# Patient Record
Sex: FEMALE | Race: WHITE | NOT HISPANIC OR LATINO | Employment: OTHER | URBAN - METROPOLITAN AREA
[De-identification: names, ages, dates, MRNs, and addresses within clinical notes are randomized per-mention and may not be internally consistent; named-entity substitution may affect disease eponyms.]

---

## 2017-01-26 ENCOUNTER — OFFICE VISIT (OUTPATIENT)
Dept: URGENT CARE | Facility: URGENT CARE | Age: 57
End: 2017-01-26
Payer: COMMERCIAL

## 2017-01-26 VITALS
TEMPERATURE: 98.5 F | BODY MASS INDEX: 39.34 KG/M2 | HEART RATE: 86 BPM | WEIGHT: 222 LBS | SYSTOLIC BLOOD PRESSURE: 130 MMHG | DIASTOLIC BLOOD PRESSURE: 86 MMHG | OXYGEN SATURATION: 97 %

## 2017-01-26 DIAGNOSIS — R51.9 FACIAL PAIN, ACUTE: ICD-10-CM

## 2017-01-26 DIAGNOSIS — J01.90 ACUTE SINUSITIS WITH SYMPTOMS > 10 DAYS: Primary | ICD-10-CM

## 2017-01-26 PROCEDURE — 99213 OFFICE O/P EST LOW 20 MIN: CPT | Performed by: PHYSICIAN ASSISTANT

## 2017-01-26 RX ORDER — AZITHROMYCIN 250 MG/1
TABLET, FILM COATED ORAL
Qty: 6 TABLET | Refills: 0 | Status: SHIPPED | OUTPATIENT
Start: 2017-01-26 | End: 2017-09-12

## 2017-01-26 RX ORDER — PREDNISONE 20 MG/1
20 TABLET ORAL 2 TIMES DAILY
Qty: 10 TABLET | Refills: 0 | Status: SHIPPED | OUTPATIENT
Start: 2017-01-26 | End: 2017-09-12

## 2017-01-26 NOTE — NURSING NOTE
"Chief Complaint   Patient presents with     URI     sx for 6 days,cough,congestion and now rt ear pain       Initial /86 mmHg  Pulse 86  Temp(Src) 98.5  F (36.9  C) (Oral)  Wt 222 lb (100.699 kg)  SpO2 97%  LMP 02/20/2015 Estimated body mass index is 39.34 kg/(m^2) as calculated from the following:    Height as of 9/12/16: 5' 3\" (1.6 m).    Weight as of this encounter: 222 lb (100.699 kg).  BP completed using cuff size: regular    "

## 2017-01-27 NOTE — PROGRESS NOTES
"SUBJECTIVE:   Onofre Robertson is a 57 year old female presenting with a chief complaint of nasal congestion, rhinorrhea, \"cold symptoms\", ear pain right and facial pain/pressure.  Onset of symptoms was over 1 week  ago.  Course of illness is worsening.    Severity moderate  Current and Associated symptoms: nasal congestion, rhinorrhea, ear pain right and facial pain/pressure  Treatment measures tried include OTC meds.  Predisposing factors include recent illness.    Past Medical History   Diagnosis Date     Malignant neoplasm of thyroid gland (H) 1980     Thyroid cancer     Unspecified hypothyroidism      High cholesterol      Anaphylaxis      reaction to penicillin     Lyme disease      H pylori ulcer      PONV (postoperative nausea and vomiting)      n/v postop     Arthritis         Allergies   Allergen Reactions     Contrast Dye      respiratory     Morphine Anaphylaxis     Penicillins Anaphylaxis     \"ANAPHYLACTIC\" ER     Celexa [Citalopram] Itching, Swelling and Rash     Tongue and face numbness, couldn't taste right,  tongue swelling and itchy rash     Prozac [Fluoxetine] Itching, Swelling and Rash     Tongue and face numbness, couldn't taste right, tongue swelling and itchy rash         Social History   Substance Use Topics     Smoking status: Current Every Day Smoker -- 0.50 packs/day     Types: Other     Last Attempt to Quit: 01/15/2011     Smokeless tobacco: Never Used      Comment: e-cigarettes      Alcohol Use: 0.0 oz/week     0 Standard drinks or equivalent per week      Comment: 6 PER WEEK       ROS:  CONSTITUTIONAL:NEGATIVE for fever, chills, change in weight  INTEGUMENTARY/SKIN: NEGATIVE for worrisome rashes, moles or lesions  ENT/MOUTH: POSITIVE for sinus congestion, sinus pain, right ear pain  RESP:NEGATIVE for significant cough or SOB  CV: NEGATIVE for chest pain, palpitations or peripheral edema  MUSCULOSKELETAL: NEGATIVE for significant arthralgias or myalgia  NEURO: NEGATIVE for weakness, " dizziness or paresthesias    OBJECTIVE  :/86 mmHg  Pulse 86  Temp(Src) 98.5  F (36.9  C) (Oral)  Wt 222 lb (100.699 kg)  SpO2 97%  LMP 02/20/2015  GENERAL APPEARANCE: healthy, alert and no distress  EYES: EOMI,  PERRL, conjunctiva clear  HENT: TM's normal bilaterally, nasal turbinates erythematous, swollen, rhinorrhea purulent, frontal sinus tenderness  and maxillary sinus tenderness   NECK: supple, nontender, no lymphadenopathy  RESP: lungs clear to auscultation - no rales, rhonchi or wheezes  CV: regular rates and rhythm, normal S1 S2, no murmur noted  ABDOMEN:  soft, nontender, no HSM or masses and bowel sounds normal  NEURO: Normal strength and tone, sensory exam grossly normal,  normal speech and mentation  SKIN: no suspicious lesions or rashes    ASSESSMENT/PLAN:      ICD-10-CM    1. Acute sinusitis with symptoms > 10 days J01.90 azithromycin (ZITHROMAX) 250 MG tablet   2. Facial pain, acute R51 predniSONE (DELTASONE) 20 MG tablet     Saline nasal spray  Motrin  Follow up as needed

## 2017-04-27 ENCOUNTER — TRANSFERRED RECORDS (OUTPATIENT)
Dept: HEALTH INFORMATION MANAGEMENT | Facility: CLINIC | Age: 57
End: 2017-04-27

## 2017-09-07 ENCOUNTER — TELEPHONE (OUTPATIENT)
Dept: FAMILY MEDICINE | Facility: CLINIC | Age: 57
End: 2017-09-07

## 2017-09-07 DIAGNOSIS — G89.29 CHRONIC MIDLINE LOW BACK PAIN WITHOUT SCIATICA: Primary | ICD-10-CM

## 2017-09-07 DIAGNOSIS — M54.50 CHRONIC MIDLINE LOW BACK PAIN WITHOUT SCIATICA: Primary | ICD-10-CM

## 2017-09-07 RX ORDER — LIDOCAINE 50 MG/G
1 PATCH TOPICAL EVERY 24 HOURS
Qty: 10 PATCH | Refills: 3 | Status: SHIPPED | OUTPATIENT
Start: 2017-09-07 | End: 2018-12-08

## 2017-09-07 NOTE — TELEPHONE ENCOUNTER
Pending Prescriptions:                       Disp   Refills    lidocaine (LIDODERM) 5 % Patch            10 pat*3            Sig: Place 1 patch onto the skin every 24 hours          Last Written Prescription Date:  na  Last Fill Quantity: na,   # refills: na  Last Office Visit with [facility], [facility] or [facility] prescribing provider: 09/12/2016  Future Office visit:    Next 5 appointments (look out 90 days)     Sep 12, 2017  5:30 PM CDT   [facility]    [address] [facility] [address]   [phone]                   Routing refill request to provider for review/approval because:  Drug not on the [facility], [facility] or [facility] refill protocol or controlled substance  Drug not active on patient's medication list  Medication is reported/historical

## 2017-09-07 NOTE — TELEPHONE ENCOUNTER
Reason for Call:  Medication or medication refill: lidocaine patch     Do you use a [facility]?  Name of the pharmacy and phone number for the current request:     [facility] [address] [place] [address] [address] [facility] [address]    Name of the medication requested: Lidocaine patch     Other request: N/A     Can we leave a detailed message on this number? YES    Phone number patient can be reached at: Work number on file:  [phone] (work)    Best Time: anytime     Call taken on 9/7/2017 at 9:08 AM by Indira Ferguson

## 2017-09-12 ENCOUNTER — OFFICE VISIT (OUTPATIENT)
Dept: FAMILY MEDICINE | Facility: CLINIC | Age: 57
End: 2017-09-12
Payer: COMMERCIAL

## 2017-09-12 VITALS
TEMPERATURE: 98.1 F | BODY MASS INDEX: 39.34 KG/M2 | SYSTOLIC BLOOD PRESSURE: 136 MMHG | OXYGEN SATURATION: 96 % | HEART RATE: 70 BPM | WEIGHT: 222 LBS | DIASTOLIC BLOOD PRESSURE: 76 MMHG | HEIGHT: 63 IN

## 2017-09-12 DIAGNOSIS — E03.9 HYPOTHYROIDISM, UNSPECIFIED TYPE: ICD-10-CM

## 2017-09-12 DIAGNOSIS — E78.5 HYPERLIPIDEMIA LDL GOAL <160: ICD-10-CM

## 2017-09-12 DIAGNOSIS — K62.5 HEMORRHAGE OF RECTUM AND ANUS: Primary | ICD-10-CM

## 2017-09-12 DIAGNOSIS — K21.9 GASTROESOPHAGEAL REFLUX DISEASE WITHOUT ESOPHAGITIS: ICD-10-CM

## 2017-09-12 LAB
ERYTHROCYTE [DISTWIDTH] IN BLOOD BY AUTOMATED COUNT: 12.9 % (ref 10–15)
HCT VFR BLD AUTO: 38.6 % (ref 35–47)
HGB BLD-MCNC: 13.2 G/DL (ref 11.7–15.7)
MCH RBC QN AUTO: 32.6 PG (ref 26.5–33)
MCHC RBC AUTO-ENTMCNC: 34.2 G/DL (ref 31.5–36.5)
MCV RBC AUTO: 95 FL (ref 78–100)
PLATELET # BLD AUTO: 226 10E9/L (ref 150–450)
RBC # BLD AUTO: 4.05 10E12/L (ref 3.8–5.2)
WBC # BLD AUTO: 6.9 10E9/L (ref 4–11)

## 2017-09-12 PROCEDURE — 99213 OFFICE O/P EST LOW 20 MIN: CPT | Performed by: INTERNAL MEDICINE

## 2017-09-12 PROCEDURE — 85027 COMPLETE CBC AUTOMATED: CPT | Performed by: INTERNAL MEDICINE

## 2017-09-12 PROCEDURE — 80061 LIPID PANEL: CPT | Performed by: INTERNAL MEDICINE

## 2017-09-12 PROCEDURE — 36415 COLL VENOUS BLD VENIPUNCTURE: CPT | Performed by: INTERNAL MEDICINE

## 2017-09-12 RX ORDER — SIMVASTATIN 10 MG
10 TABLET ORAL AT BEDTIME
Qty: 90 TABLET | Refills: 3 | Status: SHIPPED | OUTPATIENT
Start: 2017-09-12 | End: 2017-10-30

## 2017-09-12 RX ORDER — OMEPRAZOLE 40 MG/1
40 CAPSULE, DELAYED RELEASE ORAL DAILY
Qty: 90 CAPSULE | Refills: 3 | Status: SHIPPED | OUTPATIENT
Start: 2017-09-12 | End: 2020-04-22

## 2017-09-12 NOTE — LETTER
[place] [address] [facility]  [address]  [place]  [address]  Tel: [phone]    September 18, 2017    Onofre Robertson  [address] [place] [id number]        Dear MsJacquie Ammonaleisha,    I had the opportunity to review your recent labs and a summary of your labs reads as follows:     Your complete blood counts show no sign of anemia, normal white blood cell count and platelets.   Your fasting lipid panel show   - normal HDL (good) cholesterol -as your goal is greater than 40   - low LDL (bad) cholesterol as your goal is less than 160  - no need for statin at this time, but of course the best thing you could do for your cardiac health would be to try to quit smoking.  I know you can do it.  Let me know if you need any help or want to discuss this.   - stable triglyceride levels     The 10-year ASCVD risk score (Augustaterese INGRAM Jr, et al., 2013) is: 6.3%     Values used to calculate the score:       Age: 57 years       Sex: Female       Is Non- : No       Diabetic: No       Tobacco smoker: Yes       Systolic Blood Pressure: 136 mmHg       Is BP treated: No       HDL Cholesterol: 55 mg/dL       Total Cholesterol: 218 mg/dL       Congratulaions on your excellent results       Sincerely,    Jacob Nicole MD/nkechi        Results for orders placed or performed in visit on 09/12/17   CBC with platelets   Result Value Ref Range    WBC 6.9 4.0 - 11.0 10e9/L    RBC Count 4.05 3.8 - 5.2 10e12/L    Hemoglobin 13.2 11.7 - 15.7 g/dL    Hematocrit 38.6 35.0 - 47.0 %    MCV 95 78 - 100 fl    MCH 32.6 26.5 - 33.0 pg    MCHC 34.2 31.5 - 36.5 g/dL    RDW 12.9 10.0 - 15.0 %    Platelet Count 226 150 - 450 10e9/L   Lipid panel reflex to direct LDL   Result Value Ref Range    Cholesterol 218 (H) <200 mg/dL    Triglycerides 184 (H) <150 mg/dL    HDL Cholesterol 55 >49 mg/dL    LDL Cholesterol Calculated 126 (H) <100 mg/dL    Non HDL Cholesterol 163 (H) <130 mg/dL

## 2017-09-12 NOTE — PATIENT INSTRUCTIONS
(K62.5) Hemorrhage of rectum and anus  (primary encounter diagnosis)  Comment: We will refer to gastroenterology - start fiber supplement and increase water in your diet and check complete blood counts and consider colonoscopy  Plan: GASTROENTEROLOGY ADULT REF CONSULT ONLY, CBC         with platelets            (E78.5) Hyperlipidemia LDL goal <160  Comment: check fasting lipid panel today   Plan: simvastatin (ZOCOR) 10 MG tablet, Lipid panel         reflex to direct LDL            (K21.9) Gastroesophageal reflux disease without esophagitis  Comment: gastroesophageal reflux stable - continue omeprazole  Plan: omeprazole (PRILOSEC) 40 MG capsule            (E03.9) Hypothyroidism, unspecified type  Comment: follow up in endocrinology   Plan: CANCELED: TSH with free T4 reflex

## 2017-09-12 NOTE — MR AVS SNAPSHOT
After Visit Summary   9/12/2017    Onofre Robertson    MRN: [id number]           Patient Information     Date Of Birth          1960        Visit Information        Provider Department      9/12/2017 5:30 PM Jacob Nicole MD [facility]        Today's Diagnoses     Hemorrhage of rectum and anus    -  1    Hyperlipidemia LDL goal <160        Gastroesophageal reflux disease without esophagitis        Hypothyroidism, unspecified type          Care Instructions    (K62.5) Hemorrhage of rectum and anus  (primary encounter diagnosis)  Comment: We will refer to gastroenterology - start fiber supplement and increase water in your diet and check complete blood counts and consider colonoscopy  Plan: GASTROENTEROLOGY ADULT REF CONSULT ONLY, CBC         with platelets            (E78.5) Hyperlipidemia LDL goal <160  Comment: check fasting lipid panel today   Plan: simvastatin (ZOCOR) 10 MG tablet, Lipid panel         reflex to direct LDL            (K21.9) Gastroesophageal reflux disease without esophagitis  Comment: gastroesophageal reflux stable - continue omeprazole  Plan: omeprazole (PRILOSEC) 40 MG capsule            (E03.9) Hypothyroidism, unspecified type  Comment: follow up in endocrinology   Plan: CANCELED: TSH with free T4 reflex                     Follow-ups after your visit        Additional Services     GASTROENTEROLOGY ADULT REF CONSULT ONLY       Preferred Location: [facility] [phone]      Please be aware that coverage of these services is subject to the terms and limitations of your health insurance plan.  Call member services at your health plan with any benefit or coverage questions.  Any procedures must be performed at a [facility] facility OR coordinated by your clinic's referral office.    Please bring the following with you to your appointment:    (1) Any X-Rays, CTs or MRIs which have been performed.  Contact the facility where they were done to arrange for pick  "up prior to your scheduled appointment.    (2) List of current medications   (3) This referral request   (4) Any documents/labs given to you for this referral                  Who to contact     If you have questions or need follow up information about today's clinic visit or your schedule please contact [facility] directly at [phone].  Normal or non-critical lab and imaging results will be communicated to you by MyChart, letter or phone within 4 business days after the clinic has received the results. If you do not hear from us within 7 days, please contact the clinic through [facility] or phone. If you have a critical or abnormal lab result, we will notify you by phone as soon as possible.  Submit refill requests through [employer] or call your pharmacy and they will forward the refill request to us. Please allow 3 business days for your refill to be completed.          Additional Information About Your Visit        [facility] [facility] Information     [employer] lets you send messages to your doctor, view your test results, renew your prescriptions, schedule appointments and more. To sign up, go to [email or web address] . Click on \"Log in\" on the left side of the screen, which will take you to the Welcome page. Then click on \"Sign up Now\" on the right side of the page.     You will be asked to enter the access code listed below, as well as some personal information. Please follow the directions to create your username and password.     Your access code is: [email or web address]  Expires: 2017  6:05 PM     Your access code will  in 90 days. If you need help or a new code, please call your [facility] clinic or [phone].        Care EveryWhere ID     This is your Care EveryWhere ID. This could be used by other organizations to access your [facility] medical records  [id number]        Your Vitals Were     Pulse Temperature Height Last Period Pulse Oximetry Breastfeeding?    70 98.1  F (36.7  C) (Tympanic) 5' 3\" " (1.6 m) 02/20/2015 96% No    BMI (Body Mass Index)                   39.33 kg/m2            Blood Pressure from Last 3 Encounters:   09/12/17 136/76   01/26/17 130/86   11/15/16 129/84    Weight from Last 3 Encounters:   09/12/17 222 lb (100.7 kg)   01/26/17 222 lb (100.7 kg)   11/15/16 200 lb (90.7 kg)              We Performed the Following     CBC with platelets     GASTROENTEROLOGY ADULT REF CONSULT ONLY     Lipid panel reflex to direct LDL          Where to get your medicines      These medications were sent to [id number] [address] [place] [facility] [address]  [address], [facility] [address]     Phone:  [phone]     omeprazole 40 MG capsule    simvastatin 10 MG tablet          Primary Care Provider Office Phone # Fax #    Jacob Nicole [phone] [address] [address]        Equal Access to Services     [facility]: Hadii aad ku hadasho Soomaali, waaxda luqadaha, qaybta kaalmada adeegyada, waxarina garvey . So [facility] [phone].    ATENCIÓN: Si habla español, tiene a martinez disposición servicios gratuitos de asistencia lingüística. Llame al [phone].    We comply with applicable federal civil rights laws and [place] laws. We do not discriminate on the basis of race, color, national origin, age, disability sex, sexual orientation or gender identity.            Thank you!     Thank you for choosing [facility]  for your care. Our goal is always to provide you with excellent care. Hearing back from our patients is one way we can continue to improve our services. Please take a few minutes to complete the written survey that you may receive in the mail after your visit with us. Thank you!             Your Updated Medication List - Protect others around you: Learn how to safely use, store and throw away your medicines at www.disposemymeds.org.          This list is  accurate as of: 9/12/17  6:05 PM.  Always use your most recent med list.                   Brand Name Dispense Instructions for use Diagnosis    acetaminophen 325 MG tablet    TYLENOL    100 tablet    Take 2 tablets (650 mg) by mouth every 4 hours as needed for mild pain    S/P hysterectomy with oophorectomy       ibuprofen 800 MG tablet    ADVIL/MOTRIN    90 tablet    Take 1 tablet (800 mg) by mouth every 8 hours as needed for moderate pain    S/P hysterectomy       lidocaine 5 % Patch    LIDODERM    10 patch    Place 1 patch onto the skin every 24 hours    Chronic midline low back pain without sciatica       omeprazole 40 MG capsule    priLOSEC    90 capsule    Take 1 capsule (40 mg) by mouth daily    Gastroesophageal reflux disease without esophagitis       simvastatin 10 MG tablet    ZOCOR    90 tablet    Take 1 tablet (10 mg) by mouth At Bedtime    Hyperlipidemia LDL goal <160       SYNTHROID 137 MCG tablet   Generic drug:  levothyroxine      Take 150 mcg by mouth daily

## 2017-09-12 NOTE — PROGRESS NOTES
"[facility]  CLINIC PROGRESS NOTE    Subjective:  Blood in the stool   Onofre Robertson has noticed bloodint he stool 4-5 times this past year on toilet paper.  Most recently she had an episode two weeks ago of significant blood in the stool as well as on the toilet paper.  Blood was bright red.  No pain, no fevers.  No bleeding since that episode.  No straining to have bowel movement.  No loose stools.  She believes she has had hemorrhoids in the past but no issues.      Past medical history, medications, allergies, social history, family history reviewed and updated in EPIC as of 9/12/2017 .    ROS  CONSTITUTIONAL: no fatigue, no unexpected change in weight  SKIN: no worrisome rashes, no worrisome moles, no worrisome lesions  EYES: no acute vision problems or changes  ENT: no ear problems, no mouth problems, no throat problems  RESP: no significant cough, no shortness of breath  CV: no chest pain, no palpitations, no new or worsening peripheral edema  GI: noted blood in the stool.   : no frequency, no dysuria, no hematuria  MS: no claudication, no myalgias, no joint aches  PSYCHIATRIC: no changes in mood or affect      Objective:  Vitals  /76 (BP Location: Left arm, Patient Position: Sitting, Cuff Size: Adult Large)  Pulse 70  Temp 98.1  F (36.7  C) (Tympanic)  Ht 5' 3\" (1.6 m)  Wt 222 lb (100.7 kg)  LMP 02/20/2015  SpO2 96%  Breastfeeding? No  BMI 39.33 kg/m2  GEN: Alert Oriented x3 NAD  HEENT: Atraumatic, normocephalic, neck supple, no thyromegaly, negative cervical adenopathy  TM: TM bilaterally pearly and grey with normal light reflex  CV: RRR no murmurs or rubs  PULM: CTA no wheezes or crackles  ABD: Soft, nontender nondistended, no hepatosplenomegally  SKIN: No visible skin lesion or ulcerations  EXT: 2+ dorsal pedis pulses, no edema bilateral lower extremities  NEURO: Gait and station deferred, No focal neurologic deficits  PSYCH: Mood good, affect mood congruent    No results " found for this or any previous visit (from the past 24 hour(s)).    Assessment/Plan:  Patient Instructions   (K62.5) Hemorrhage of rectum and anus  (primary encounter diagnosis)  Comment: We will refer to gastroenterology - start fiber supplement and increase water in your diet and check complete blood counts and consider colonoscopy  Plan: GASTROENTEROLOGY ADULT REF CONSULT ONLY, CBC         with platelets            (E78.5) Hyperlipidemia LDL goal <160  Comment: check fasting lipid panel today   Plan: simvastatin (ZOCOR) 10 MG tablet, Lipid panel         reflex to direct LDL            (K21.9) Gastroesophageal reflux disease without esophagitis  Comment: gastroesophageal reflux stable - continue omeprazole  Plan: omeprazole (PRILOSEC) 40 MG capsule            (E03.9) Hypothyroidism, unspecified type  Comment: follow up in endocrinology   Plan: CANCELED: TSH with free T4 reflex               Follow up in gastroenterology     Disclaimer: This note consists of symbols derived from keyboarding, dictation and/or voice recognition software. As a result, there may be errors in the script that have gone undetected. Please consider this when interpreting information found in this chart.    Jacob Nicole MD  [phone]

## 2017-09-12 NOTE — NURSING NOTE
"Chief Complaint   Patient presents with     Rectal Problem       Initial /76 (BP Location: Left arm, Patient Position: Sitting, Cuff Size: Adult Large)  Pulse 70  Temp 98.1  F (36.7  C) (Tympanic)  Ht 5' 3\" (1.6 m)  Wt 222 lb (100.7 kg)  LMP 02/20/2015  SpO2 96%  Breastfeeding? No  BMI 39.33 kg/m2 Estimated body mass index is 39.33 kg/(m^2) as calculated from the following:    Height as of this encounter: 5' 3\" (1.6 m).    Weight as of this encounter: 222 lb (100.7 kg).  Medication Reconciliation: complete   Taylor Dwyer- RIMA      "

## 2017-09-13 LAB
CHOLEST SERPL-MCNC: 218 MG/DL
HDLC SERPL-MCNC: 55 MG/DL
LDLC SERPL CALC-MCNC: 126 MG/DL
NONHDLC SERPL-MCNC: 163 MG/DL
TRIGL SERPL-MCNC: 184 MG/DL

## 2017-09-17 DIAGNOSIS — E78.5 HYPERLIPIDEMIA LDL GOAL <160: ICD-10-CM

## 2017-09-17 DIAGNOSIS — K21.9 GASTROESOPHAGEAL REFLUX DISEASE WITHOUT ESOPHAGITIS: ICD-10-CM

## 2017-09-18 RX ORDER — SIMVASTATIN 10 MG
TABLET ORAL
Refills: 0 | OUTPATIENT
Start: 2017-09-18

## 2017-09-18 RX ORDER — OMEPRAZOLE 40 MG/1
CAPSULE, DELAYED RELEASE ORAL
Refills: 0 | OUTPATIENT
Start: 2017-09-18

## 2017-09-18 NOTE — TELEPHONE ENCOUNTER
Omeprazole      Last Written Prescription Date: 09/12/17  Last Fill Quantity: 90,  # refills: 3   Last Office Visit with [facility] or [facility] prescribing provider: 09/12/17                                             Simvastatin     Last Written Prescription Date: 09/12/17  Last Fill Quantity: 90, # refills: 3  Last Office Visit with [facility] or [facility] prescribing provider: 09/12/17       Lab Results   Component Value Date    CHOL 218 09/12/2017     Lab Results   Component Value Date    HDL 55 09/12/2017     Lab Results   Component Value Date     09/12/2017     Lab Results   Component Value Date    TRIG 184 09/12/2017     Lab Results   Component Value Date    CHOLHDLRATIO 4.3 10/08/2014     Jaida Danielle MA

## 2017-10-30 ENCOUNTER — OFFICE VISIT (OUTPATIENT)
Dept: ENDOCRINOLOGY | Facility: CLINIC | Age: 57
End: 2017-10-30

## 2017-10-30 VITALS
SYSTOLIC BLOOD PRESSURE: 142 MMHG | WEIGHT: 230.3 LBS | OXYGEN SATURATION: 96 % | BODY MASS INDEX: 40.8 KG/M2 | TEMPERATURE: 97.3 F | HEART RATE: 64 BPM | HEIGHT: 63 IN | DIASTOLIC BLOOD PRESSURE: 87 MMHG

## 2017-10-30 DIAGNOSIS — E66.01 MORBID OBESITY (H): Primary | ICD-10-CM

## 2017-10-30 RX ORDER — TOPIRAMATE 25 MG/1
75 TABLET, FILM COATED ORAL AT BEDTIME
Qty: 90 TABLET | Refills: 3 | Status: SHIPPED | OUTPATIENT
Start: 2017-10-30 | End: 2018-02-14

## 2017-10-30 ASSESSMENT — ENCOUNTER SYMPTOMS
CONSTIPATION: 0
BACK PAIN: 1
BLOOD IN STOOL: 0
JOINT SWELLING: 1
MUSCLE WEAKNESS: 0
ABDOMINAL PAIN: 1
STIFFNESS: 1
BLOATING: 1
VOMITING: 0
NAUSEA: 0
HEARTBURN: 1
ARTHRALGIAS: 1
RECTAL PAIN: 0
DIARRHEA: 0
RECTAL BLEEDING: 1
MYALGIAS: 0
JAUNDICE: 0
BOWEL INCONTINENCE: 0
MUSCLE CRAMPS: 0
NECK PAIN: 0

## 2017-10-30 ASSESSMENT — PAIN SCALES - GENERAL: PAINLEVEL: NO PAIN (0)

## 2017-10-30 NOTE — MR AVS SNAPSHOT
After Visit Summary   10/30/2017    Onofre Robertson    MRN: [id number]           Patient Information     Date Of Birth          1960        Visit Information        Provider Department      10/30/2017 2:40 PM Chastity Rausch MD [facility] Management        Today's Diagnoses     Morbid obesity (H)    -  1      Care Instructions      MEDICATION STARTED AT THIS APPOINTMENT  We are starting topiramate at bedtime.  Start one tab, 25 mg, for a week. Go up to 50 mg (2 tabs) for the next week. At the third week, take   3 tabs (75 mg).  Stay at 3 tabs until you are seen again. Call the nurse at [phone] if you have any questions or concerns. (Do not stop taking it if you don't think it's working. For some people it works even though they do not feel much different.)    Topiramate (Topamax) is a medication that is used most often to treat migraine headaches or for seizures. It has also been found to help with weight loss. Although it's not currently FDA approved for weight loss, it has been used safely for a number of years to help people who are carrying extra weight.     Just how topiramate helps with weight loss has not been exactly determined. However it seems to work on areas of the brain to quiet down signals related to eating.      Topiramate may make you:    >feel less interest in eating in between meals   >think less about food and eating   >find it easier to push the plate away   >find giving up pop easier    >have an easier time eating less    For some of our patients, the pills work right away. They feel and think quite differently about food. Other patients don't feel much of a change but find in fact they have lost weight! Like all weight loss medications, topiramate works best when you help it work.  This means:    1) Have less tempting high calorie (fattening) food around the house or office    2) Have lower calorie food (fruits, vegetables,low fat meats and dairy) for  snacks    3) Eat out only one time or less each week.   4) Eat your meals at a table with the TV or computer off.    Side-effects. Topiramate is generally well tolerated. The main side-effects we see are:   Tingling in hands,feet, or face (usually not very troublesome)   Mental confusion and word finding trouble (about 10% of patients have this.)     Feeling sleepy or a bit dopey- this goes away very soon after starting.    One of the dangers of topiramate is the possibility of birth defects--if you get pregnant when you are on it, there is the risk that your baby will be born with a cleft lip or palate.  If you are on topiramate and of child bearing age, you need to be on a reliable form of birth control or refrain from sexual intercourse.     Please refer to the pharmacy insert for more information on side-effects. Since many pharmacists are not familiar with the use of topiramate in weight loss, calling the clinic will get you the most accurate information on the use of this medication for weight loss.     In order to get refills of this or any medication we prescribe you must be seen in the medical weight mgmt clinic every 2-3 months. Please have your pharmacy fax a refill request to [phone].                  Follow-ups after your visit        Follow-up notes from your care team     Return in about 6 weeks (around 12/11/2017).      Who to contact     Please call your clinic at [phone] to:    Ask questions about your health    Make or cancel appointments    Discuss your medicines    Learn about your test results    Speak to your doctor   If you have compliments or concerns about an experience at your clinic, or if you wish to file a complaint, please contact [facility] Patient Relations at [phone] or email us at [email or web address]         Additional Information About Your Visit        M[employer] Information     [employer] is an electronic gateway that provides  "easy, online access to your medical records. With disc[employer], you can request a clinic appointment, read your test results, renew a prescription or communicate with your care team.     To sign up for disco volante visit the website at [email or web address]   You will be asked to enter the access code listed below, as well as some personal information. Please follow the directions to create your username and password.     Your access code is: [email or web address]  Expires: 2017  6:05 PM     Your access code will  in 90 days. If you need help or a new code, please contact your [facility] or call [phone] for assistance.        Care EveryWhere ID     This is your Care EveryWhere ID. This could be used by other organizations to access your [facility] medical records  [id number]        Your Vitals Were     Pulse Temperature Height Last Period Pulse Oximetry BMI (Body Mass Index)    64 97.3  F (36.3  C) 1.6 m (5' 3\") 2015 96% 40.8 kg/m2       Blood Pressure from Last 3 Encounters:   10/30/17 142/87   17 136/76   17 130/86    Weight from Last 3 Encounters:   10/30/17 104.5 kg (230 lb 4.8 oz)   17 100.7 kg (222 lb)   17 100.7 kg (222 lb)              Today, you had the following     No orders found for display         Today's Medication Changes          These changes are accurate as of: 10/30/17  3:37 PM.  If you have any questions, ask your nurse or doctor.               Start taking these medicines.        Dose/Directions    topiramate 25 MG tablet   Commonly known as:  TOPAMAX   Used for:  Morbid obesity (H)        Dose:  75 mg   Take 3 tablets (75 mg) by mouth At Bedtime   Quantity:  90 tablet   Refills:  3            Where to get your medicines      Call your pharmacy to confirm that your medication is ready for pickup. It may take up to 24 hours for them to receive the prescription. If the prescription is not ready within 3 business days, please contact " your clinic or your provider.     We will let you know when these medications are ready. If you don't hear back within 3 business days, please contact us.     topiramate 25 MG tablet                Primary Care Provider Office Phone # Fax #    Jacob Nicole [phone] [address]  [place] [address]        Equal Access to Services     [facility]: Hadii aad ku hadasho Soomaali, waaxda luqadaha, qaybta kaalmada adeegyada, waxay idiin hayaan adeeg kharash la'aan . So [facility] [phone].    ATENCIÓN: Si habla español, tiene a martinez disposición servicios gratuitos de asistencia lingüística. Louisame al [phone].    We comply with applicable federal civil rights laws and [place] laws. We do not discriminate on the basis of race, color, national origin, age, disability, sex, sexual orientation, or gender identity.            Thank you!     Thank you for choosing [facility]ICAL [facility] MANAGEMENT  for your care. Our goal is always to provide you with excellent care. Hearing back from our patients is one way we can continue to improve our services. Please take a few minutes to complete the written survey that you may receive in the mail after your visit with us. Thank you!             Your Updated Medication List - Protect others around you: Learn how to safely use, store and throw away your medicines at www.disposemymeds.org.          This list is accurate as of: 10/30/17  3:37 PM.  Always use your most recent med list.                   Brand Name Dispense Instructions for use Diagnosis    acetaminophen 325 MG tablet    TYLENOL    100 tablet    Take 2 tablets (650 mg) by mouth every 4 hours as needed for mild pain    S/P hysterectomy with oophorectomy       ibuprofen 800 MG tablet    ADVIL/MOTRIN    90 tablet    Take 1 tablet (800 mg) by mouth every 8 hours as needed for moderate pain    S/P hysterectomy       lidocaine 5 % Patch    LIDODERM    10 patch    Place 1 patch onto  the skin every 24 hours    Chronic midline low back pain without sciatica       omeprazole 40 MG capsule    priLOSEC    90 capsule    Take 1 capsule (40 mg) by mouth daily    Gastroesophageal reflux disease without esophagitis       SYNTHROID 137 MCG tablet   Generic drug:  levothyroxine      Take 150 mcg by mouth daily        topiramate 25 MG tablet    TOPAMAX    90 tablet    Take 3 tablets (75 mg) by mouth At Bedtime    Morbid obesity (H)

## 2017-10-30 NOTE — PATIENT INSTRUCTIONS
MEDICATION STARTED AT THIS APPOINTMENT  We are starting topiramate at bedtime.  Start one tab, 25 mg, for a week. Go up to 50 mg (2 tabs) for the next week. At the third week, take   3 tabs (75 mg).  Stay at 3 tabs until you are seen again. Call the nurse at [phone] if you have any questions or concerns. (Do not stop taking it if you don't think it's working. For some people it works even though they do not feel much different.)    Topiramate (Topamax) is a medication that is used most often to treat migraine headaches or for seizures. It has also been found to help with weight loss. Although it's not currently FDA approved for weight loss, it has been used safely for a number of years to help people who are carrying extra weight.     Just how topiramate helps with weight loss has not been exactly determined. However it seems to work on areas of the brain to quiet down signals related to eating.      Topiramate may make you:    >feel less interest in eating in between meals   >think less about food and eating   >find it easier to push the plate away   >find giving up pop easier    >have an easier time eating less    For some of our patients, the pills work right away. They feel and think quite differently about food. Other patients don't feel much of a change but find in fact they have lost weight! Like all weight loss medications, topiramate works best when you help it work.  This means:    1) Have less tempting high calorie (fattening) food around the house or office    2) Have lower calorie food (fruits, vegetables,low fat meats and dairy) for snacks    3) Eat out only one time or less each week.   4) Eat your meals at a table with the TV or computer off.    Side-effects. Topiramate is generally well tolerated. The main side-effects we see are:   Tingling in hands,feet, or face (usually not very troublesome)   Mental confusion and word finding trouble (about 10% of patients have this.)     Feeling sleepy  or a bit dopey- this goes away very soon after starting.    One of the dangers of topiramate is the possibility of birth defects--if you get pregnant when you are on it, there is the risk that your baby will be born with a cleft lip or palate.  If you are on topiramate and of child bearing age, you need to be on a reliable form of birth control or refrain from sexual intercourse.     Please refer to the pharmacy insert for more information on side-effects. Since many pharmacists are not familiar with the use of topiramate in weight loss, calling the clinic will get you the most accurate information on the use of this medication for weight loss.     In order to get refills of this or any medication we prescribe you must be seen in the medical weight mgmt clinic every 2-3 months. Please have your pharmacy fax a refill request to [phone].

## 2017-10-30 NOTE — PROGRESS NOTES
"    New Medical Weight Management Consult    PATIENT:  Onofre Robertson  MRN:         [id number]  :         1960  GERARDO:         10/30/2017    Dear Corey, Jacob Ventura,    I had the pleasure of seeing your patient, Onofre Robertson.  Full intake/assessment done to determine barriers to weight loss success and develop a treatment plan.  Onofre Robertson is a 57 year old female interested in treatment of medical problems associated with weight.      No breakfast  First meal is lunch at 11 am.   Dinner at home      Her weight today is 230 lbs 4.8 oz, Body mass index is 40.8 kg/(m^2)., and she has the following co-morbidities:     10/30/2017   I have the following co-morbidities associated with obesity: GERD (Reflux), Weight Bearing Joint Pain       Patient Goals Reviewed With Patient 10/30/2017   I am interested in attaining a healthier weight to diminish current health problems related to co-morbid conditions: Yes   I am interested in attaining a healthier weight in order to prevent future health problems: Yes       Referring Provider 10/30/2017   Please name the provider who referred you to Medical Weight Management.  If you do not know, please answer: \"I Don't Know\". idolkoph       Wt Readings from Last 4 Encounters:   10/30/17 104.5 kg (230 lb 4.8 oz)   17 100.7 kg (222 lb)   17 100.7 kg (222 lb)   11/15/16 90.7 kg (200 lb)       Weight History Reviewed With Patient 10/30/2017   How concerned are you about your weight? Very Concerned   Would you describe your weight gain as gradual? No   I became overweight: As an Adult   The following factors have contributed to my weight gain:  A Health Crisis/Stress, After Quitting Smoking, Lack of Exercise, Genetic (Runs in the Family)   I have tried the following methods to lose weight: Watching Portions or Calories, Exercise, Weight Watchers, Atkins-type Diet (Low Carb/High Protein), Nutrisystem, Slim Fast or Other Liquid Diets   I have the following " family history of obesity/being overweight:  My father is overweight, Many of my relatives are overweight   Has anyone in your family had weight loss surgery? No       Diet Recall Reviewed With Patient 10/30/2017   How many glasses of juice do you drink in a typical day? 0   How many of glasses of milk do you drink in a typical day? 0   How many 8oz glasses of sugar containing drinks such as Geraldo-Aid/sweet tea do you drink in a day? 0   How many cans/bottles of sugar pop/soda/tea/sports drinks do you drink in a day? 0   How many cans/bottles of diet pop/soda/tea or sports drink do you drink in a day? 1   How often do you have a drink of alcohol? 2-3 TImes a Week   If you do drink, how many drinks might you have in a day? 3-4       Eating Habits Reviewed With Patient 10/30/2017   Generally, my meals include foods like these: bread, pasta, rice, potatoes, corn, crackers, sweet dessert, pop, or juice. A Few Times a Week   Generally, my meals include foods like these: fried meats, brats, burgers, french fries, pizza, cheese, chips, or ice cream. Half of the Week   Eat fast food (like [employer], [employer], [employer]). Never   Eat at a buffet or sit-down restaurant. Never   Eat most of my meals in front of the TV or computer. Almost Everyday   Often skip meals, eat at random times, have no regular eating times. Everyday   Rarely sit down for a meal but snack or graze throughout.  A Few Times a Week   Eat extra snacks between meals. A Few Times a Week   Eat most of my food at the end of the day. Half of the Week   Eat in the middle of the night or wake up at night to eat. Never   Eat extra snacks to prevent or correct low blood sugar. Never   Eat to prevent acid reflux or stomach pain. Never   Worry about not having enough food to eat. Never   Have you been to the food shelf at least a few times this year? No   I eat when I am depressed, stressed, anxious, or bored. A Few Times a Week   I eat when I am happy or as a  reward. A Few Times a Week   I feel hungry all the time even if I just have eaten. Never   Feeling full is important to me. Never   Once I start eating, it is hard to stop. Never   I finish all the food on my plate even if I am already full. Never   I can't resist eating delicious food or walk past the good food/smell. A Few Times a Week   I eat/snack without noticing that I am eating. Never   I eat when I am preparing the meal. Never   I eat more than usual when I see others eating. Never   I have trouble not eating sweets, ice cream, cookies, or chips if they are around the house. Never   I think about food all day. Never   What foods, if any, do you crave? Cheese   Please list any other foods you crave? saltyfoods   I feel out of control when eating. Never   I eat a large amount of food, like a loaf of bread, a box of cookies, a pint/quart of ice cream, all at once. Never   I eat a large amount of food even when I am not hungry. Never   I eat rapidly. Never   I eat alone because I feel embarrassed and do not want others to see how much I have eaten. Never   I eat until I am uncomfortably full. Almost Everyday   I feel bad, disgusted, or guilty after I overeat. Never   I make myself vomit what I have eaten or use laxatives to get rid of food. Never       Activity/Exercise History Reviewed With Patient 10/30/2017   How much of a typical 12 hour day do you spend sitting? Most of the Day   How much of a typical 12 hour day do you spend lying down? Half the Day   How much of a typical day do you spend walking/standing? Less Than Half the Day   How many hours (not including work) do you spend on the TV/Video Games/Computer/Tablet/Phone? 2-3 Hours   How many times a week are you active for the purpose of exercise? Once a Week   How many total minutes do you spend doing some activity for the purpose of exercising when you exercise? Less Than 15 Minutes   What keeps you from being more active? Lack of Time, Too tired        Review of Systems     Constitutional:  Negative for fever, chills, weight loss, weight gain, fatigue, decreased appetite, night sweats, recent stressors, height gain, height loss, post-operative complications, incisional pain, hallucinations, increased energy, hyperactivity and confused.   HENT:  Negative for ear pain, hearing loss, tinnitus, nosebleeds, trouble swallowing, hoarse voice, mouth sores, sore throat, ear discharge, tooth pain, gum tenderness, taste disturbance, smell disturbance, hearing aid, bleeding gums, dry mouth, sinus pain, sinus congestion and neck mass.    Eyes:  Negative for double vision, pain, redness, eye pain, decreased vision, eye watering, eye bulging, eye dryness, flashing lights, spots, floaters, strabismus, tunnel vision, jaundice and eye irritation.   Respiratory:   Negative for cough, hemoptysis, sputum production, shortness of breath, wheezing, sleep disturbances due to breathing, snores loudly, respiratory pain, dyspnea on exertion, cough disturbing sleep and postural dyspnea.    Cardiovascular:  Negative for chest pain, dyspnea on exertion, palpitations, orthopnea, claudication, leg swelling, fingers/toes turn blue, hypertension, hypotension, syncope, history of heart murmur, chest pain on exertion, chest pain at rest, pacemaker, few scattered varicosities, leg pain, sleep disturbances due to breathing, tachycardia, light-headedness, exercise intolerance and edema.   Gastrointestinal:  Positive for heartburn, abdominal pain, bloating and rectal bleeding. Negative for nausea, vomiting, diarrhea, constipation, blood in stool, melena, rectal pain, hemorrhoids, bowel incontinence, jaundice, coffee ground emesis and change in stool.   Genitourinary:  Negative for bladder incontinence, dysuria, urgency, hematuria, flank pain, vaginal discharge, difficulty urinating, genital sores, dyspareunia, decreased libido, nocturia, voiding less frequently, arousal difficulty, abnormal  vaginal bleeding, excessive menstruation, menstrual changes, hot flashes, vaginal dryness and postmenopausal bleeding.   Musculoskeletal:  Positive for back pain, joint swelling, arthralgias and stiffness. Negative for myalgias, muscle cramps, neck pain, bone pain, muscle weakness and fracture.   Skin:  Negative for nail changes, itching, poor wound healing, rash, hair changes, skin changes, acne, warts, poor wound healing, scarring, flaky skin, Raynaud's phenomenon, sensitivity to sunlight and skin thickening.   Neurological:  Negative for dizziness, tingling, tremors, speech change, seizures, loss of consciousness, weakness, light-headedness, numbness, headaches, disturbances in coordination, extremity numbness, memory loss, difficulty walking and paralysis.   Endo/Heme:  Negative for anemia, swollen glands and bruises/bleeds easily.   Psychiatric/Behavioral:  Negative for depression, hallucinations, memory loss, decreased concentration, mood swings and panic attacks.    Breast:  Negative for breast discharge, breast mass, breast pain and nipple retraction.   Endocrine:  Negative for altered temperature regulation, polyphagia, polydipsia, unwanted hair growth and change in facial hair.      PAST MEDICAL HISTORY:  Past Medical History:   Diagnosis Date     Anaphylaxis     reaction to penicillin     Arthritis      H pylori ulcer      High cholesterol      Lyme disease      Malignant neoplasm of thyroid gland (H) 1980    Thyroid cancer     PONV (postoperative nausea and vomiting)     n/v postop     Unspecified hypothyroidism        Work/Social History Reviewed With Patient 10/30/2017   My employment status is: Full-Time   My job is:    How much of your job is spent on the computer or phone? 100%   What is your marital status? /In a Relationship   If in a relationship, is your significant other overweight? No   Do you have children? Yes   If you have children, are they overweight? Yes  "      Mental Health History Reviewed With Patient 10/30/2017   Have you ever been physically or sexually abused? No   How often in the past 2 weeks have you felt little interest or pleasure in doing things? For Several Days   Over the past 2 weeks how often have you felt down, depressed, or hopeless? For Several Days       Sleep History Reviewed With Patient 10/30/2017   How many hours do you sleep at night? 4.5   Do you think that you snore loudly or has anybody ever heard you snore loudly (louder than talking or so loud it can be heard behind a shut door)? Yes   Has anyone seen or heard you stop breathing during your sleep? No   Do you often feel tired, fatigued, or sleepy during the day? Yes       MEDICATIONS:   Current Outpatient Prescriptions   Medication Sig Dispense Refill     omeprazole (PRILOSEC) 40 MG capsule Take 1 capsule (40 mg) by mouth daily 90 capsule 3     lidocaine (LIDODERM) 5 % Patch Place 1 patch onto the skin every 24 hours 10 patch 3     ibuprofen (ADVIL,MOTRIN) 800 MG tablet Take 1 tablet (800 mg) by mouth every 8 hours as needed for moderate pain 90 tablet 1     acetaminophen (TYLENOL) 325 MG tablet Take 2 tablets (650 mg) by mouth every 4 hours as needed for mild pain 100 tablet 0     levothyroxine (SYNTHROID) 137 MCG tablet Take 150 mcg by mouth daily          ALLERGIES:   Allergies   Allergen Reactions     Contrast Dye      respiratory     Morphine Anaphylaxis     Penicillins Anaphylaxis     \"ANAPHYLACTIC\" ER     Celexa [Citalopram] Itching, Swelling and Rash     Tongue and face numbness, couldn't taste right,  tongue swelling and itchy rash     Prozac [Fluoxetine] Itching, Swelling and Rash     Tongue and face numbness, couldn't taste right, tongue swelling and itchy rash       PHYSICAL EXAM:  /87 (BP Location: Left arm, Patient Position: Chair, Cuff Size: Adult Regular)  Pulse 64  Temp 97.3  F (36.3  C)  Ht 1.6 m (5' 3\")  Wt 104.5 kg (230 lb 4.8 oz)  LMP 02/20/2015  SpO2 96% "  BMI 40.8 kg/m2   A & O x 3  HEENT: NCAT, mucous membranes moist  Respirations unlabored  Location of obesity: Central Obesity    ASSESSMENT:  Onofre is a patient with mature onset obesity with significant element of familial/genetic influence and with current health consequences. She does need aggressive weight loss plan due to comorbidities.     There seems to be a mismatch in her reported food intake and weight gain. Some of it could have been thyroid related. I sense that there is a bit more consumption of processed foods.   She has poor sleep.   TSH is optimal.     PLAN:    - reduce processed foods  - plan and cook more  - increase exercise to goal of 30 min daily 5 times a week  - stress reduction  - sleep: is an issue: consider sleep study and work on improving sleep hygiene.     Misperception/Metabolic  Ancillary testing: N/A  Food Plan:  Volumetrics and High protein/low carbohydrate.  Activity Plan:   Start exerrcisng.  Supplementary:  N/A.  Medication:  The patient will begin medication in pursuit of improved medical status as influenced by body weight. She will start topiramate. Patient was made aware that topiramate is not approved for the treatment of obesity.  There is a mutual understanding of the goals and risks of this therapy. The patient is in agreement. She is educated on dosage regimen and possible side effects.        RTC:    6 weeks.    TIME: 40 min spent on evaluation, management, counseling, education, & motivational interviewing with greater than 50 % of the total time was spent on counseling and coordinating care    Sincerely,    Chastity Rausch MD

## 2017-10-30 NOTE — LETTER
"10/30/2017        RE: Onofre Robertson  [address] [facility] [address]     Dear Colleague,    Thank you for referring your patient, Onofre Robertson, to the [facility] MEDICAL WEIGHT MANAGEMENT at [facility]. Please see a copy of my visit note below.        New Medical Weight Management Consult    PATIENT:  Onofre Robertson  MRN:         [id number]  :         1960  GERARDO:         10/30/2017    Dear Corey, Jacob Ventura,    I had the pleasure of seeing your patient, Onofre Robertson.  Full intake/assessment done to determine barriers to weight loss success and develop a treatment plan.  Onofre Robertson is a 57 year old female interested in treatment of medical problems associated with weight.      No breakfast  First meal is lunch at 11 am.   Dinner at home      Her weight today is 230 lbs 4.8 oz, Body mass index is 40.8 kg/(m^2)., and she has the following co-morbidities:     10/30/2017   I have the following co-morbidities associated with obesity: GERD (Reflux), Weight Bearing Joint Pain       Patient Goals Reviewed With Patient 10/30/2017   I am interested in attaining a healthier weight to diminish current health problems related to co-morbid conditions: Yes   I am interested in attaining a healthier weight in order to prevent future health problems: Yes       Referring Provider 10/30/2017   Please name the provider who referred you to Medical Weight Management.  If you do not know, please answer: \"I Don't Know\". idolkoph       Wt Readings from Last 4 Encounters:   10/30/17 104.5 kg (230 lb 4.8 oz)   17 100.7 kg (222 lb)   17 100.7 kg (222 lb)   11/15/16 90.7 kg (200 lb)       Weight History Reviewed With Patient 10/30/2017   How concerned are you about your weight? Very Concerned   Would you describe your weight gain as gradual? No   I became overweight: As an Adult   The following factors have contributed to my weight gain:  A Health Crisis/Stress, " After Quitting Smoking, Lack of Exercise, Genetic (Runs in the Family)   I have tried the following methods to lose weight: Watching Portions or Calories, Exercise, Weight Watchers, Atkins-type Diet (Low Carb/High Protein), Nutrisystem, Slim Fast or Other Liquid Diets   I have the following family history of obesity/being overweight:  My father is overweight, Many of my relatives are overweight   Has anyone in your family had weight loss surgery? No       Diet Recall Reviewed With Patient 10/30/2017   How many glasses of juice do you drink in a typical day? 0   How many of glasses of milk do you drink in a typical day? 0   How many 8oz glasses of sugar containing drinks such as Geraldo-Aid/sweet tea do you drink in a day? 0   How many cans/bottles of sugar pop/soda/tea/sports drinks do you drink in a day? 0   How many cans/bottles of diet pop/soda/tea or sports drink do you drink in a day? 1   How often do you have a drink of alcohol? 2-3 TImes a Week   If you do drink, how many drinks might you have in a day? 3-4       Eating Habits Reviewed With Patient 10/30/2017   Generally, my meals include foods like these: bread, pasta, rice, potatoes, corn, crackers, sweet dessert, pop, or juice. A Few Times a Week   Generally, my meals include foods like these: fried meats, brats, burgers, french fries, pizza, cheese, chips, or ice cream. Half of the Week   Eat fast food (like [employer], [employer], [employer]). Never   Eat at a buffet or sit-down restaurant. Never   Eat most of my meals in front of the TV or computer. Almost Everyday   Often skip meals, eat at random times, have no regular eating times. Everyday   Rarely sit down for a meal but snack or graze throughout.  A Few Times a Week   Eat extra snacks between meals. A Few Times a Week   Eat most of my food at the end of the day. Half of the Week   Eat in the middle of the night or wake up at night to eat. Never   Eat extra snacks to prevent or correct low blood sugar.  Never   Eat to prevent acid reflux or stomach pain. Never   Worry about not having enough food to eat. Never   Have you been to the food shelf at least a few times this year? No   I eat when I am depressed, stressed, anxious, or bored. A Few Times a Week   I eat when I am happy or as a reward. A Few Times a Week   I feel hungry all the time even if I just have eaten. Never   Feeling full is important to me. Never   Once I start eating, it is hard to stop. Never   I finish all the food on my plate even if I am already full. Never   I can't resist eating delicious food or walk past the good food/smell. A Few Times a Week   I eat/snack without noticing that I am eating. Never   I eat when I am preparing the meal. Never   I eat more than usual when I see others eating. Never   I have trouble not eating sweets, ice cream, cookies, or chips if they are around the house. Never   I think about food all day. Never   What foods, if any, do you crave? Cheese   Please list any other foods you crave? saltyfoods   I feel out of control when eating. Never   I eat a large amount of food, like a loaf of bread, a box of cookies, a pint/quart of ice cream, all at once. Never   I eat a large amount of food even when I am not hungry. Never   I eat rapidly. Never   I eat alone because I feel embarrassed and do not want others to see how much I have eaten. Never   I eat until I am uncomfortably full. Almost Everyday   I feel bad, disgusted, or guilty after I overeat. Never   I make myself vomit what I have eaten or use laxatives to get rid of food. Never       Activity/Exercise History Reviewed With Patient 10/30/2017   How much of a typical 12 hour day do you spend sitting? Most of the Day   How much of a typical 12 hour day do you spend lying down? Half the Day   How much of a typical day do you spend walking/standing? Less Than Half the Day   How many hours (not including work) do you spend on the TV/Video Games/Computer/Tablet/Phone?  2-3 Hours   How many times a week are you active for the purpose of exercise? Once a Week   How many total minutes do you spend doing some activity for the purpose of exercising when you exercise? Less Than 15 Minutes   What keeps you from being more active? Lack of Time, Too tired       Review of Systems     Constitutional:  Negative for fever, chills, weight loss, weight gain, fatigue, decreased appetite, night sweats, recent stressors, height gain, height loss, post-operative complications, incisional pain, hallucinations, increased energy, hyperactivity and confused.   HENT:  Negative for ear pain, hearing loss, tinnitus, nosebleeds, trouble swallowing, hoarse voice, mouth sores, sore throat, ear discharge, tooth pain, gum tenderness, taste disturbance, smell disturbance, hearing aid, bleeding gums, dry mouth, sinus pain, sinus congestion and neck mass.    Eyes:  Negative for double vision, pain, redness, eye pain, decreased vision, eye watering, eye bulging, eye dryness, flashing lights, spots, floaters, strabismus, tunnel vision, jaundice and eye irritation.   Respiratory:   Negative for cough, hemoptysis, sputum production, shortness of breath, wheezing, sleep disturbances due to breathing, snores loudly, respiratory pain, dyspnea on exertion, cough disturbing sleep and postural dyspnea.    Cardiovascular:  Negative for chest pain, dyspnea on exertion, palpitations, orthopnea, claudication, leg swelling, fingers/toes turn blue, hypertension, hypotension, syncope, history of heart murmur, chest pain on exertion, chest pain at rest, pacemaker, few scattered varicosities, leg pain, sleep disturbances due to breathing, tachycardia, light-headedness, exercise intolerance and edema.   Gastrointestinal:  Positive for heartburn, abdominal pain, bloating and rectal bleeding. Negative for nausea, vomiting, diarrhea, constipation, blood in stool, melena, rectal pain, hemorrhoids, bowel incontinence, jaundice, coffee  ground emesis and change in stool.   Genitourinary:  Negative for bladder incontinence, dysuria, urgency, hematuria, flank pain, vaginal discharge, difficulty urinating, genital sores, dyspareunia, decreased libido, nocturia, voiding less frequently, arousal difficulty, abnormal vaginal bleeding, excessive menstruation, menstrual changes, hot flashes, vaginal dryness and postmenopausal bleeding.   Musculoskeletal:  Positive for back pain, joint swelling, arthralgias and stiffness. Negative for myalgias, muscle cramps, neck pain, bone pain, muscle weakness and fracture.   Skin:  Negative for nail changes, itching, poor wound healing, rash, hair changes, skin changes, acne, warts, poor wound healing, scarring, flaky skin, Raynaud's phenomenon, sensitivity to sunlight and skin thickening.   Neurological:  Negative for dizziness, tingling, tremors, speech change, seizures, loss of consciousness, weakness, light-headedness, numbness, headaches, disturbances in coordination, extremity numbness, memory loss, difficulty walking and paralysis.   Endo/Heme:  Negative for anemia, swollen glands and bruises/bleeds easily.   Psychiatric/Behavioral:  Negative for depression, hallucinations, memory loss, decreased concentration, mood swings and panic attacks.    Breast:  Negative for breast discharge, breast mass, breast pain and nipple retraction.   Endocrine:  Negative for altered temperature regulation, polyphagia, polydipsia, unwanted hair growth and change in facial hair.      PAST MEDICAL HISTORY:  Past Medical History:   Diagnosis Date     Anaphylaxis     reaction to penicillin     Arthritis      H pylori ulcer      High cholesterol      Lyme disease      Malignant neoplasm of thyroid gland (H) 1980    Thyroid cancer     PONV (postoperative nausea and vomiting)     n/v postop     Unspecified hypothyroidism        Work/Social History Reviewed With Patient 10/30/2017   My employment status is: Full-Time   My job is: claims  "specialist   How much of your job is spent on the computer or phone? 100%   What is your marital status? /In a Relationship   If in a relationship, is your significant other overweight? No   Do you have children? Yes   If you have children, are they overweight? Yes       Mental Health History Reviewed With Patient 10/30/2017   Have you ever been physically or sexually abused? No   How often in the past 2 weeks have you felt little interest or pleasure in doing things? For Several Days   Over the past 2 weeks how often have you felt down, depressed, or hopeless? For Several Days       Sleep History Reviewed With Patient 10/30/2017   How many hours do you sleep at night? 4.5   Do you think that you snore loudly or has anybody ever heard you snore loudly (louder than talking or so loud it can be heard behind a shut door)? Yes   Has anyone seen or heard you stop breathing during your sleep? No   Do you often feel tired, fatigued, or sleepy during the day? Yes       MEDICATIONS:   Current Outpatient Prescriptions   Medication Sig Dispense Refill     omeprazole (PRILOSEC) 40 MG capsule Take 1 capsule (40 mg) by mouth daily 90 capsule 3     lidocaine (LIDODERM) 5 % Patch Place 1 patch onto the skin every 24 hours 10 patch 3     ibuprofen (ADVIL,MOTRIN) 800 MG tablet Take 1 tablet (800 mg) by mouth every 8 hours as needed for moderate pain 90 tablet 1     acetaminophen (TYLENOL) 325 MG tablet Take 2 tablets (650 mg) by mouth every 4 hours as needed for mild pain 100 tablet 0     levothyroxine (SYNTHROID) 137 MCG tablet Take 150 mcg by mouth daily          ALLERGIES:   Allergies   Allergen Reactions     Contrast Dye      respiratory     Morphine Anaphylaxis     Penicillins Anaphylaxis     \"ANAPHYLACTIC\" ER     Celexa [Citalopram] Itching, Swelling and Rash     Tongue and face numbness, couldn't taste right,  tongue swelling and itchy rash     Prozac [Fluoxetine] Itching, Swelling and Rash     Tongue and face numbness, " "couldn't taste right, tongue swelling and itchy rash       PHYSICAL EXAM:  /87 (BP Location: Left arm, Patient Position: Chair, Cuff Size: Adult Regular)  Pulse 64  Temp 97.3  F (36.3  C)  Ht 1.6 m (5' 3\")  Wt 104.5 kg (230 lb 4.8 oz)  LMP 02/20/2015  SpO2 96%  BMI 40.8 kg/m2   A & O x 3  HEENT: NCAT, mucous membranes moist  Respirations unlabored  Location of obesity: Central Obesity    ASSESSMENT:  Onofre is a patient with mature onset obesity with significant element of familial/genetic influence and with current health consequences. She does need aggressive weight loss plan due to comorbidities.     There seems to be a mismatch in her reported food intake and weight gain. Some of it could have been thyroid related. I sense that there is a bit more consumption of processed foods.   She has poor sleep.   TSH is optimal.     PLAN:    - reduce processed foods  - plan and cook more  - increase exercise to goal of 30 min daily 5 times a week  - stress reduction  - sleep: is an issue: consider sleep study and work on improving sleep hygiene.     Misperception/Metabolic  Ancillary testing: N/A  Food Plan:  Volumetrics and High protein/low carbohydrate.  Activity Plan:   Start exerrcisng.  Supplementary:  N/A.  Medication:  The patient will begin medication in pursuit of improved medical status as influenced by body weight. She will start topiramate. Patient was made aware that topiramate is not approved for the treatment of obesity.  There is a mutual understanding of the goals and risks of this therapy. The patient is in agreement. She is educated on dosage regimen and possible side effects.        RTC:    6 weeks.    TIME: 40 min spent on evaluation, management, counseling, education, & motivational interviewing with greater than 50 % of the total time was spent on counseling and coordinating care    Sincerely,    Chastity Rausch MD             "

## 2017-10-30 NOTE — NURSING NOTE
"Chief Complaint   Patient presents with     Consult     NMWM       Vitals:    10/30/17 1441   BP: 142/87   BP Location: Left arm   Patient Position: Chair   Cuff Size: Adult Regular   Pulse: 64   Temp: 97.3  F (36.3  C)   SpO2: 96%   Weight: 230 lb 4.8 oz   Height: 5' 3\"       Body mass index is 40.8 kg/(m^2).    Carolyn Bryant                          "

## 2017-11-01 ASSESSMENT — ENCOUNTER SYMPTOMS
SNORES LOUDLY: 0
WEAKNESS: 0
DISTURBANCES IN COORDINATION: 0
SHORTNESS OF BREATH: 0
NECK PAIN: 0
FEVER: 0
EYE PAIN: 0
EXTREMITY NUMBNESS: 0
PALPITATIONS: 0
DECREASED APPETITE: 0
DIZZINESS: 0
SORE THROAT: 0
SKIN CHANGES: 0
MEMORY LOSS: 0
LEG PAIN: 0
WEIGHT GAIN: 0
NERVOUS/ANXIOUS: 0
SINUS PAIN: 0
BREAST MASS: 0
SYNCOPE: 0
HYPERTENSION: 0
POLYPHAGIA: 0
SWOLLEN GLANDS: 0
BLOOD IN STOOL: 0
HYPOTENSION: 0
LIGHT-HEADEDNESS: 0
TINGLING: 0
COUGH: 0
POSTURAL DYSPNEA: 0
VOMITING: 0
EYE WATERING: 0
SINUS CONGESTION: 0
HEADACHES: 0
TASTE DISTURBANCE: 0
DOUBLE VISION: 0
STIFFNESS: 1
TROUBLE SWALLOWING: 0
JAUNDICE: 0
HALLUCINATIONS: 0
RECTAL BLEEDING: 1
PARALYSIS: 0
BRUISES/BLEEDS EASILY: 0
DEPRESSION: 0
NIGHT SWEATS: 0
DIARRHEA: 0
SLEEP DISTURBANCES DUE TO BREATHING: 0
BOWEL INCONTINENCE: 0
INCREASED ENERGY: 0
MYALGIAS: 0
HEMATURIA: 0
DYSPNEA ON EXERTION: 0
DECREASED CONCENTRATION: 0
DECREASED LIBIDO: 0
RECTAL PAIN: 0
POOR WOUND HEALING: 0
BACK PAIN: 1
HEARTBURN: 1
ABDOMINAL PAIN: 1
ARTHRALGIAS: 1
DYSURIA: 0
TREMORS: 0
ALTERED TEMPERATURE REGULATION: 0
SPEECH CHANGE: 0
NECK MASS: 0
JOINT SWELLING: 1
SPUTUM PRODUCTION: 0
NAIL CHANGES: 0
LEG SWELLING: 0
FLANK PAIN: 0
MUSCLE WEAKNESS: 0
TACHYCARDIA: 0
EXERCISE INTOLERANCE: 0
POLYDIPSIA: 0
SMELL DISTURBANCE: 0
RESPIRATORY PAIN: 0
EYE IRRITATION: 0
HOARSE VOICE: 0
HEMOPTYSIS: 0
NAUSEA: 0
SEIZURES: 0
MUSCLE CRAMPS: 0
CLAUDICATION: 0
BLOATING: 1
EYE REDNESS: 0
LOSS OF CONSCIOUSNESS: 0
NUMBNESS: 0
PANIC: 0
WEIGHT LOSS: 0
ORTHOPNEA: 0
WHEEZING: 0
HOT FLASHES: 0
CONSTIPATION: 0
CHILLS: 0
COUGH DISTURBING SLEEP: 0
BREAST PAIN: 0
INSOMNIA: 0
DIFFICULTY URINATING: 0
FATIGUE: 0

## 2017-11-09 ENCOUNTER — CARE COORDINATION (OUTPATIENT)
Dept: SURGERY | Facility: CLINIC | Age: 57
End: 2017-11-09

## 2017-11-09 DIAGNOSIS — E66.9 OBESITY: Primary | ICD-10-CM

## 2017-11-14 ENCOUNTER — CARE COORDINATION (OUTPATIENT)
Dept: ENDOCRINOLOGY | Facility: CLINIC | Age: 57
End: 2017-11-14

## 2018-02-01 NOTE — TELEPHONE ENCOUNTER
APPT INFO    Date /Time: 2/14/18 at 2PM   Reason for Appt: NBS   Ref Provider/Clinic: Chastity Rausch   Are there internal records? Yes/No?  IF YES, list clinic names: [facility]   Are there outside records? Yes/No? No   Patient Contact (Y/N) & Call Details: No   Action: Chart reviewed

## 2018-02-14 ENCOUNTER — OFFICE VISIT (OUTPATIENT)
Dept: SURGERY | Facility: CLINIC | Age: 58
End: 2018-02-14
Payer: COMMERCIAL

## 2018-02-14 ENCOUNTER — PRE VISIT (OUTPATIENT)
Dept: SURGERY | Facility: CLINIC | Age: 58
End: 2018-02-14

## 2018-02-14 ENCOUNTER — ALLIED HEALTH/NURSE VISIT (OUTPATIENT)
Dept: SURGERY | Facility: CLINIC | Age: 58
End: 2018-02-14
Payer: COMMERCIAL

## 2018-02-14 VITALS
OXYGEN SATURATION: 95 % | HEART RATE: 68 BPM | SYSTOLIC BLOOD PRESSURE: 133 MMHG | BODY MASS INDEX: 43.72 KG/M2 | WEIGHT: 237.6 LBS | HEIGHT: 62 IN | DIASTOLIC BLOOD PRESSURE: 76 MMHG | TEMPERATURE: 98.7 F

## 2018-02-14 DIAGNOSIS — E66.01 MORBID OBESITY (H): ICD-10-CM

## 2018-02-14 DIAGNOSIS — R06.83 SNORING: ICD-10-CM

## 2018-02-14 DIAGNOSIS — E66.01 MORBID OBESITY (H): Primary | ICD-10-CM

## 2018-02-14 LAB
ALBUMIN SERPL-MCNC: 3.9 G/DL (ref 3.4–5)
ALP SERPL-CCNC: 88 U/L (ref 40–150)
ALT SERPL W P-5'-P-CCNC: 25 U/L (ref 0–50)
ANION GAP SERPL CALCULATED.3IONS-SCNC: 7 MMOL/L (ref 3–14)
AST SERPL W P-5'-P-CCNC: 13 U/L (ref 0–45)
BILIRUB SERPL-MCNC: 0.5 MG/DL (ref 0.2–1.3)
BUN SERPL-MCNC: 19 MG/DL (ref 7–30)
CALCIUM SERPL-MCNC: 8.9 MG/DL (ref 8.5–10.1)
CHLORIDE SERPL-SCNC: 105 MMOL/L (ref 94–109)
CO2 SERPL-SCNC: 28 MMOL/L (ref 20–32)
CREAT SERPL-MCNC: 0.84 MG/DL (ref 0.52–1.04)
ERYTHROCYTE [DISTWIDTH] IN BLOOD BY AUTOMATED COUNT: 12.2 % (ref 10–15)
GFR SERPL CREATININE-BSD FRML MDRD: 70 ML/MIN/1.7M2
GLUCOSE SERPL-MCNC: 88 MG/DL (ref 70–99)
HBA1C MFR BLD: 5.7 % (ref 4.3–6)
HCT VFR BLD AUTO: 40.4 % (ref 35–47)
HGB BLD-MCNC: 13.6 G/DL (ref 11.7–15.7)
MCH RBC QN AUTO: 32.1 PG (ref 26.5–33)
MCHC RBC AUTO-ENTMCNC: 33.7 G/DL (ref 31.5–36.5)
MCV RBC AUTO: 95 FL (ref 78–100)
PLATELET # BLD AUTO: 230 10E9/L (ref 150–450)
POTASSIUM SERPL-SCNC: 4.3 MMOL/L (ref 3.4–5.3)
PROT SERPL-MCNC: 7.6 G/DL (ref 6.8–8.8)
PTH-INTACT SERPL-MCNC: 94 PG/ML (ref 12–72)
RBC # BLD AUTO: 4.24 10E12/L (ref 3.8–5.2)
SODIUM SERPL-SCNC: 140 MMOL/L (ref 133–144)
WBC # BLD AUTO: 6.9 10E9/L (ref 4–11)

## 2018-02-14 NOTE — LETTER
February 20, 2018      TO: Onofre Robertson  [address] [facility] [address]         Dear MsJacquie Robertson,    We received and reviewed your test results done on 2/14/18.  You may receive more than one letter if we receive the results on multiple days.  Please share all lab and test results with your primary care provider and keep a copy for your own records.        Your test results are normal except for the following addressed below:     Your vitamin D was low so please start taking 3000 IU daily and your parathyroid was high please talk with primary and recheck labs in 2 months.    If you have any questions, feel free contact us at the Call Center [phone].      Resulted Orders   CBC with platelets   Result Value Ref Range    WBC 6.9 4.0 - 11.0 10e9/L    RBC Count 4.24 3.8 - 5.2 10e12/L    Hemoglobin 13.6 11.7 - 15.7 g/dL    Hematocrit 40.4 35.0 - 47.0 %    MCV 95 78 - 100 fl    MCH 32.1 26.5 - 33.0 pg    MCHC 33.7 31.5 - 36.5 g/dL    RDW 12.2 10.0 - 15.0 %    Platelet Count 230 150 - 450 10e9/L   Comprehensive metabolic panel   Result Value Ref Range    Sodium 140 133 - 144 mmol/L    Potassium 4.3 3.4 - 5.3 mmol/L    Chloride 105 94 - 109 mmol/L    Carbon Dioxide 28 20 - 32 mmol/L    Anion Gap 7 3 - 14 mmol/L    Glucose 88 70 - 99 mg/dL    Urea Nitrogen 19 7 - 30 mg/dL    Creatinine 0.84 0.52 - 1.04 mg/dL    GFR Estimate 70 >60 mL/min/1.7m2      Comment:      Non  GFR Calc    GFR Estimate If Black 84 >60 mL/min/1.7m2      Comment:       GFR Calc    Calcium 8.9 8.5 - 10.1 mg/dL    Bilirubin Total 0.5 0.2 - 1.3 mg/dL    Albumin 3.9 3.4 - 5.0 g/dL    Protein Total 7.6 6.8 - 8.8 g/dL    Alkaline Phosphatase 88 40 - 150 U/L    ALT 25 0 - 50 U/L    AST 13 0 - 45 U/L   Parathyroid Hormone Intact   Result Value Ref Range    Parathyroid Hormone Intact 94 (H) 12 - 72 pg/mL   Vitamin D Deficiency   Result Value Ref Range    Vitamin D Deficiency screening 10 (L) 20 - 75 ug/L       Comment:      Season, race, dietary intake, and treatment affect the concentration of   25-hydroxy-Vitamin D. Values may decrease during winter months and increase   during summer months. Values 20-29 ug/L may indicate Vitamin D insufficiency   and values <20 ug/L may indicate Vitamin D deficiency.  Vitamin D determination is routinely performed by an immunoassay specific for   25 hydroxyvitamin D3.  If an individual is on vitamin D2 (ergocalciferol)   supplementation, please specify 25 OH vitamin D2 and D3 level determination by   LCMSMS test [id number].           Sincerely,      Rebecca Wright PA-C

## 2018-02-14 NOTE — PATIENT INSTRUCTIONS
"GOALS:  Relating To Eating:  Eat slowly (20-30 minutes per meal), chewing foods well (25 chews per bite/applesauce consistency)  9\" Plate method (1/2 plate non-starchy vegetables/fruit, 1/4 plate lean protein, 1/4 plate whole grain starch - no more than 1/2 cup carb/meal)  Eat lean/ low-fat protein at all  meals  Eat 3 meals per day - may use protein shake for breakfast (less than 250 calories, at least 20 gm protein, less than 10 gm sugar)    Relating to beverages:  Reduce caffeine/carbonation/calorie containing beverages  Separate fluids from meals by 30 minutes before, during, and after eating  Drink 48-64 ounces of fluid per day    Relating to dietary supplements:  Start a multivitamin containing iron daily    Relating to activity:  Increase activity level.  Exercise 2-3 days/week for 30 minutes    Leonie Butler RD, LD  Follow up in one month  If you need to schedule or reschedule with a dietitian please call [phone].    "

## 2018-02-14 NOTE — NURSING NOTE
"(   Chief Complaint   Patient presents with     Consult     NBS, BMI 40.7    )    ( Weight: 237 lb 9.6 oz )  ( Height: 5' 1.5\" )  ( BMI (Calculated): 44.26 )  ( Initial Weight: 237 lb 9.6 oz )  ( Cumulative weight loss (lbs): 0 )  (   )  (   )  ( Waist Circumference (cm): 124 cm )  (   )    ( BP: 133/76 )  (   )  ( Temp: 98.7  F (37.1  C) )  ( Temp src: Oral )  ( Pulse: 68 )  (   )  ( SpO2: 95 % )    (   Patient Active Problem List   Diagnosis     Tobacco use disorder     Obesity     Hypothyroidism     Hyperlipidemia LDL goal <160     Osteoarthrosis involving multiple sites     Family history of malignant neoplasm of breast     Liver hemangioma     Esophageal reflux     Abdominal pain, generalized     IFG (impaired fasting glucose)     Lower back pain    )  (   Current Outpatient Prescriptions   Medication Sig Dispense Refill     omeprazole (PRILOSEC) 40 MG capsule Take 1 capsule (40 mg) by mouth daily 90 capsule 3     lidocaine (LIDODERM) 5 % Patch Place 1 patch onto the skin every 24 hours 10 patch 3     ibuprofen (ADVIL,MOTRIN) 800 MG tablet Take 1 tablet (800 mg) by mouth every 8 hours as needed for moderate pain 90 tablet 1     acetaminophen (TYLENOL) 325 MG tablet Take 2 tablets (650 mg) by mouth every 4 hours as needed for mild pain 100 tablet 0     levothyroxine (SYNTHROID) 137 MCG tablet Take 150 mcg by mouth daily       )  ( Diabetes Eval:    )    ( Pain Eval:  Data Unavailable )    ( Wound Eval:       )    (   History   Smoking Status     Former Smoker     Packs/day: 0.50     Years: 45.00     Types: Hookah     Quit date: 1/15/2011   Smokeless Tobacco     Current User     Comment: e-cigarettes     )    ( Signed By:  Benja Rhodes; February 14, 2018; 2:25 PM )    "

## 2018-02-14 NOTE — PROGRESS NOTES
"New Bariatric Nutrition Consultation Note    Reason For Visit: Nutrition Assessment    Onofre Robertson is a 58 year old presenting today for new bariatric nutrition consult.   Pt is interested in laparoscopic sleeve gastrectomy with Dr. Burch expected surgery in August 2018.  Patient is accompanied by  Vidal.  This is pt's first of 6 required nutrition visits prior to surgery. Pt referred by Rebecca FONTANA(2/14/18).     She is interested in having weight loss surgery for the following reasons:  Unable to loose weight on her own, did not tolerate medications to hep with appetite suppression     Support System Reviewed With Patient 2/14/2018   Who do you have in your support network that can be available to help you for the first 2 weeks after surgery? vidal robertson   Who can you count on for support throughout your weight loss surgery journey? vidal robertson       ANTHROPOMETRICS:  Estimated body mass index is 44.17 kg/(m^2) as calculated from the following:    Height as of an earlier encounter on 2/14/18: 1.562 m (5' 1.5\").    Weight as of an earlier encounter on 2/14/18: 107.8 kg (237 lb 9.6 oz).    Required weight loss goal pre-op: 10 lbs from initial consult weight (goal weight 227 lbs or less before surgery)       2/14/2018   I have tried the following methods to lose weight Watching portions or calories, Exercise, Weight Watchers, Atkins type diet (low carb/high protein), Paolaerma Vazquez, Pre packaged meals ex: Nutrisystem       Weight Loss Questions Reviewed With Patient 2/14/2018   How long have you been overweight? From Middle age and beyond       SUPPLEMENT INFORMATION:  none    NUTRITION HISTORY:  2-3 meals per day usually lunch and dinner  Recall Diet Questions Reviewed With Patient 2/14/2018   Describe what you typically consume for breakfast (typical or most recent): sometimes 1\2 bagel but mostly none   Describe what you typically consume for lunch (typical or most recent): veggie sandwich " and chips   Describe what you typically consume for supper (typical or most recent): salad or soup   Nachos- chips and cheese   Describe what you typically consume as snacks (typical or most recent): popcorn   How many ounces of water, or other low calorie drinks, do you drink daily (8 oz=1 glass)? 24 oz (propel)   How many ounces of caffeine (coffee, tea, pop) do you drink daily (8 oz=1 glass)? 16 oz    How many ounces of carbonated (pop, beer, sparkling water) drinks do you drinky daily (8 oz=1 glass)? 24 oz diet coke   How many ounces of milk do you drink daily (8 oz=1 glass) 8 oz   Please indicate the type of milk: skim   How often do you drink alcohol? 2-3 times a week   If you do drink alcohol, how many drinks might you have in a day? (one drink = 5 oz. wine, 1 can/bottle of beer, 1 shot liquor) 3 or 4   *plans to stop drinking completely.  Box of wine last ~2 weeks    Eating Habits 2/14/2018   Do you currently binge eat (eat a large amount of food in a short time)? Yes   Are you an emotional eater? No   Do you get up to eat after falling asleep? No   What foods do you crave? chips       ADDITIONAL INFORMATION:    Dining Out History Reviewed With Patient 2/14/2018   How often do you dine out? A couple of times a week.   Where do you dine out? (select all that apply) sit-down restaurants   What types of food do you order when you dine out? zoraida vandananon salbettye        Physical Activity Reviewed With Patient 2/14/2018   How often do you exercise? Less than 1 time per week   What is the duration of your exercise (in minutes)? 20 Minutes   What types of exercise do you do? walking   What keeps you from being more active?  I should be more active but I just have not gotten around to it, Lack of Time, Too tired       NUTRITION DIAGNOSIS:  Obesity r/t long history of self-monitoring deficit and excessive energy intake aeb BMI >30.    INTERVENTION:  Intervention Provided/Education Provided on post-op diet guidelines,  "vitamins/minerals essential post-operatively, GI anatomy of bariatric surgeries, ways to help prepare for post-op diet guidelines pre-operatively, portion/calorie-control, mindful eating and sources of protein.  Patient reported she dislikes a lot of protein, discussed protein shake options along protein sources patient finds acceptable.  Provided pt with list of goals RD contact information.      Questions Reviewed With Patient 2/14/2018   How ready are you to make changes regarding your weight? Number 1 = Not ready at all to make changes up to 10 = very ready. 10   How confident are you that you can change? 1 = Not confident that you will be successful making changes up to 10 = very confident. 10       Patient Understanding: good  Expected Compliance: good    GOALS:  Relating To Eating:  Eat slowly (20-30 minutes per meal), chewing foods well (25 chews per bite/applesauce consistency)  9\" Plate method (1/2 plate non-starchy vegetables/fruit, 1/4 plate lean protein, 1/4 plate whole grain starch - no more than 1/2 cup carb/meal)  Eat lean/ low-fat protein at all  meals  Eat 3 meals per day - may use protein shake for breakfast (less than 250 calories, at least 20 gm protein, less than 10 gm sugar)    Relating to beverages:  Reduce caffeine/carbonation/calorie containing beverages  Separate fluids from meals by 30 minutes before, during, and after eating  Drink 48-64 ounces of fluid per day    Relating to dietary supplements:  Start a multivitamin containing iron daily    Relating to activity:  Increase activity level.  Exercise 2-3 days/week for 30 minutes      Time spent with patient: 45 minutes.  Leonie Butler, RD, LD    "

## 2018-02-14 NOTE — MR AVS SNAPSHOT
"              After Visit Summary   2/14/2018    Onofre Robertson    MRN: [id number]           Patient Information     Date Of Birth          1960        Visit Information        Provider Department      2/14/2018 2:00 PM Rebecca Wright PA-C [facility]        Today's Diagnoses     Morbid obesity (H)    -  1    Snoring          Care Instructions    See dietitian in 1 month  Labs today first floor  Bariatric Task List  Status:  Is patient a candidate for bariatric surgery?:  Yes -     Status:  surgery evaluation in process -     Surgeon: Dr Burch -     Tentative surgery month/year: August 2018 -        Insurance: Insurance:  [facility] -        Patient Info: Initial Weight:  237 lb 9.6 oz -     Date of Initial Weight/Height:  2/14/2018 -     Goal Weight (lbs):  227 -     Required Weight Loss:  10 -     Surgery Type:  sleeve gastrectomy -        Dietician Visits: Structured weight loss required by insurance?:  Yes -     Dietician Visit 1:  Completed -     Dietician Visit 2:  Needed -     Dietician Visit 3:  Needed -     Dietician Visit 4:  Needed -     Dietician Visit 5:  Needed -     Dietician Visit 6:  Needed -        Psychological Evaluation: Psych eval:  Needed -        Lab Work: Complete Blood Count:  Needed -     Comprehensive Metabolic Panel:  Needed -     Vitamin D:  Needed -     Hgb A1c:  Needed -     PTH:  Needed -        Consults/ Clearance: Sleep Medicine:  Needed -     Cardiac:  Needed -        PCP: Establish care with PCP:  Completed -     PCP letter of support:  Needed -        Patient Education:  Information Session:  Completed -     Given \"Making your decision\" handout?:  Yes -     Given support group information?:  Yes -     Support plan in place?:  Completed -     Research consents signed?:  Yes -        Final Tasks:  Before surgery online class:  Needed -     Before surgery online class website link:  [email or web address]   After surgery online " class:  Needed -     After surgery online class website link:  [email or web address]   Nurse visit for weigh-in and information:  Needed -     Pre-assessment clinic visit with anesthesia team for H&P:  Needed -     Final labs (Hgb, plt, T&S, UA):  Needed -        Notes:   -                 Follow-ups after your visit        Additional Services     CARDIOLOGY EVAL ADULT REFERRAL       Please be aware that coverage of these services is subject to the terms and limitations of your health insurance plan.  Call member services at your health plan with any benefit or coverage questions.      Clearance for weight loss surgery    Please bring the following to your appointment:  Any x-rays, CTs or MRIs which have been performed. Contact the facility where they were done to arrange for  prior to your scheduled appointment.    List of current medications  This referral request   Any documents/labs given to you for this referral    Additional information including address information can be found on the web at:  Janessa Yadkin [phone] [email or web address]/[email or web address]-gzxefwe-muhslico  [place] [phone] [email or web address].org/locations/[facility]/zrhoxhvg-emfgnhm-lhguzyp[facility] [phone] [email or web address]/[facility] [phone] [email or web address] [phone] [email or web address]/[email or web address]/[facility] [phone]  [email or web address]/sfmbjpab-hqxkcl-fockpzsgkcenter  [place] [phone]  [email or web address] [phone]  [email or web address]  [place] [phone]    [email or web address]            SLEEP EVALUATION  & MANAGEMENT REFERRAL - [facility]  [phone] (Age 18 and up)       Please be aware that coverage of these services is subject to the terms and limitations of your health insurance plan.  Call member services at your health plan with any benefit or coverage questions.      Please bring the following to your appointment:    >>   List of current medications   >>   This referral request   >>   Any documents/labs given to you for this referral                      Future tests that were ordered for you today     Open Future Orders        Priority Expected Expires Ordered    SLEEP EVALUATION & MANAGEMENT REFERRAL - [facility]  [phone] (Age 18 and up) Routine  2/14/2019 2/14/2018    Hemoglobin A1c Routine  5/15/2018 2/14/2018            Who to contact     Please call your clinic at [phone] to:    Ask questions about your health    Make or cancel appointments    Discuss your medicines    Learn about your test results    Speak to your doctor            Additional Information About Your Visit        [employer] Information     [employer] gives you secure access to your electronic health record. If you see a primary care provider, you can also send messages to your care team and make appointments. If you have questions, please call your primary care clinic.  If you do not have a primary care provider, please call [phone] and they will assist you.      [employer] is an electronic gateway that provides easy, online access to your medical records. With [employer], you can request a clinic appointment, read your test results, renew a prescription or communicate with your care team.     To access your existing account, please contact your [facility] or call [phone] for assistance.        Care EveryWhere ID     This is your Care EveryWhere ID. This could be used by other organizations to access your [facility]  "records  [id number]        Your Vitals Were     Pulse Temperature Height Last Period Pulse Oximetry BMI (Body Mass Index)    68 98.7  F (37.1  C) (Oral) 5' 1.5\" 02/20/2015 95% 44.17 kg/m2       Blood Pressure from Last 3 Encounters:   02/14/18 133/76   10/30/17 142/87   09/12/17 136/76    Weight from Last 3 Encounters:   02/14/18 237 lb 9.6 oz   10/30/17 230 lb 4.8 oz   09/12/17 222 lb              We Performed the Following     CARDIOLOGY EVAL ADULT REFERRAL     CBC with platelets     Comprehensive metabolic panel     Parathyroid Hormone Intact     Vitamin D Deficiency          Today's Medication Changes          These changes are accurate as of 2/14/18  2:40 PM.  If you have any questions, ask your nurse or doctor.               Stop taking these medicines if you haven't already. Please contact your care team if you have questions.     topiramate 25 MG tablet   Commonly known as:  TOPAMAX   Stopped by:  Rebecca Wright PA-C                    Primary Care Provider Office Phone # Fax #    Jacob Nicole [phone] [address]  [facility] [address]        Equal Access to Services     [facility]: Hadii leonel rutho So[place], waaxda luramonadaha, qaybta kaalmada adeegyada, teagan garvey . So [facility] [phone].    ATENCIÓN: Si habla español, tiene a martinez disposición servicios gratuitos de asistencia lingüística. Llame al [phone].    We comply with applicable federal civil rights laws and [place] laws. We do not discriminate on the basis of race, color, national origin, age, disability, sex, sexual orientation, or gender identity.            Thank you!     Thank you for choosing [facility]  for your care. Our goal is always to provide you with excellent care. Hearing back from our patients is one way we can continue to improve our services. Please take a few minutes to complete the written survey that you may " receive in the mail after your visit with us. Thank you!             Your Updated Medication List - Protect others around you: Learn how to safely use, store and throw away your medicines at www.disposemymeds.org.          This list is accurate as of 2/14/18  2:40 PM.  Always use your most recent med list.                   Brand Name Dispense Instructions for use Diagnosis    acetaminophen 325 MG tablet    TYLENOL    100 tablet    Take 2 tablets (650 mg) by mouth every 4 hours as needed for mild pain    S/P hysterectomy with oophorectomy       ibuprofen 800 MG tablet    ADVIL/MOTRIN    90 tablet    Take 1 tablet (800 mg) by mouth every 8 hours as needed for moderate pain    S/P hysterectomy       lidocaine 5 % Patch    LIDODERM    10 patch    Place 1 patch onto the skin every 24 hours    Chronic midline low back pain without sciatica       omeprazole 40 MG capsule    priLOSEC    90 capsule    Take 1 capsule (40 mg) by mouth daily    Gastroesophageal reflux disease without esophagitis       SYNTHROID 137 MCG tablet   Generic drug:  levothyroxine      Take 150 mcg by mouth daily

## 2018-02-14 NOTE — PROGRESS NOTES
"New Bariatric Surgery Consultation Note    RE: Onofre Robertson  MR#: [id number]  : 1960      Referring provider: Dr Jacob Nicole    Chief Complaint/Reason for visit: evaluation for possible weight loss surgery    Dear Corey, Jacob Ventura MD (General),    I had the pleasure of seeing your patient, Onofre Robertson, to evaluate her obesity and consider her for possible weight loss surgery. As you know, Onofre Robertson is 58 year old.  She has a height of 5' 1.5\", a weight of 237 lbs 9.6 oz, and calculated Body mass index is 44.17 kg/(m^2).    HISTORY OF PRESENT ILLNESS:  Weight Loss History Reviewed with Patient 2018   How long have you been overweight? From Middle age and beyond   What is the most that you have ever weighed? 235   What is the most weight you have lost? 30   I have tried the following methods to lose weight Watching portions or calories, Exercise, Weight Watchers, Atkins type diet (low carb/high protein), Paola George, Pre packaged meals ex: Nutrisystem   I have tried the following weight loss medications? (Check all that apply) Topamax/Topiramate   Have you ever had weight loss surgery? No   Saw Dr Rausch 10/30/17 and started topiramate.  She didn't tolerate it and now is interested in weight loss surgery.    CO-MORBIDITIES OF OBESITY INCLUDE:     2018   I have the following co-morbidities associated with obesity: High Blood Pressure, Sleep Apnea, GERD (Reflux), Fatty Liver, Weight Bearing Joint Pain   Do you use a CPAP? No   Sleep apnea symptoms, has not had sleep study  GERD takes omeprazole 40mg daily  Hx of radiation for thyroid cancer in  and started gaining weight    PAST MEDICAL HISTORY:  Past Medical History:   Diagnosis Date     Anaphylaxis     reaction to penicillin     Arthritis      Esophageal reflux      H pylori ulcer      High cholesterol      History of radiation therapy      Lyme disease      Malignant neoplasm of thyroid gland (H)     Thyroid " cancer     PONV (postoperative nausea and vomiting)     n/v postop     Unspecified hypothyroidism        PAST SURGICAL HISTORY:  Past Surgical History:   Procedure Laterality Date     BIOPSY       BREAST SURGERY       C  DELIVERY ONLY       C LIGATE FALLOPIAN TUBE       C NONSPECIFIC PROCEDURE      LT BREAST LUMPECTOMY     C NONSPECIFIC PROCEDURE      thyroidectomy.     COLONOSCOPY       COSMETIC SURGERY       CYSTOSCOPY N/A 2015    Procedure: CYSTOSCOPY;  Surgeon: Denisse Luong DO;  Location: [facility] OR     DAVINCI HYSTERECTOMY TOTAL, BILATERAL SALPINGO-OOPHORECTOMY, COMBINED Bilateral 2015    Procedure: COMBINED DAVINCI HYSTERECTOMY TOTAL, SALPINGO-OOPHORECTOMY;  Surgeon: Denisse Luong DO;  Location: [facility] OR     HC REMOVAL OF TONSILS,<13 Y/O       HYSTERECTOMY, PAP NO LONGER INDICATED       ORTHOPEDIC SURGERY         FAMILY HISTORY:   Family History   Problem Relation Age of Onset     HEART DISEASE Father       59 YO MI X4,COLITIS,DM,OBESITY     Hyperlipidemia Father      GASTROINTESTINAL DISEASE Mother      78 YO STOMACH ULCERS,QUIT DRINKING 12 YEARS AGO     Breast Cancer Mother      Thyroid Disease Mother      Other Cancer Mother      Neurologic Disorder Sister       21 YO EPILEPSY       SOCIAL HISTORY:   Social History Questions Reviewed With Patient 2018   Which best describes your employment status (select all that apply) I work full-time   If you work, what is your occupation?    Which best describes your marital status:    Do you have children? Yes   Who do you have in your support network that can be available to help you for the first 2 weeks after surgery? [employer]   Who can you count on for support throughout your weight loss surgery journey? [employer]   Can you afford 3 meals a day?  Yes   Can you afford 50-60 dollars a month for vitamins? Yes   2 children    HABITS:     2018   How often do you drink alcohol? 2-3  times a week   If you do drink alcohol, how many drinks might you have in a day? (one drink = 5 oz. wine, 1 can/bottle of beer, 1 shot liquor) 3 or 4   Do you currently use any of the following Nicotine products? e-cigarettes   Have you ever used any of the following nicotine products? Cigarettes   If you previously used any of these products, what year did you quit? 2011   Have you or are you currently using street drugs or prescription strength medication for which you do not have a prescription for? No   Do you have a history of chemical dependency (alcohol or drug abuse)? No       PSYCHOLOGICAL HISTORY:   Psychological History Reviewed With Patient 2/14/2018   Have you ever attempted suicide? Never.   Have you had thoughts of suicide in the past year? No   Have you ever been hospitalized for mental illness or a suicide attempt? Never.   Do you have a history of chronic pain? No   Have you ever been diagnosed with fibromyalgia? No   Are you currently seeing a therapist or counselor?  No   Are you currently seeing a psychiatrist? No       ROS:     2/14/2018   Skin:  None of the above   HEENT: None of these   Musculoskeletal: Joint Pain, Back pain, Swelling of legs, Arthritis   Cardiovascular: Shortness of breath with activity   Pulmonary: Shortness of breath with activity, Snoring, Awaken from sleep to catch your breath   Gastrointestinal: Reflux   Genitourinary: Stress incontinence (losing urine when coughing, sneezing, etc.)   Hematological: None of the above   Neurological: None of the above   Female only: None of the above       EATING BEHAVIORS:     2/14/2018   Have you or anyone else thought that you had an eating disorder? No   Do you currently binge eat (eat a large amount of food in a short time)? Yes   Are you an emotional eater? No   Do you get up to eat after falling asleep? No       EXERCISE:     2/14/2018   How often do you exercise? Less than 1 time per week   What is the duration of your exercise  "(in minutes)? 20 Minutes   What types of exercise do you do? walking   What keeps you from being more active?  I should be more active but I just have not gotten around to it, Lack of Time, Too tired       MEDICATIONS:  Current Outpatient Prescriptions   Medication     omeprazole (PRILOSEC) 40 MG capsule     lidocaine (LIDODERM) 5 % Patch     ibuprofen (ADVIL,MOTRIN) 800 MG tablet     acetaminophen (TYLENOL) 325 MG tablet     levothyroxine (SYNTHROID) 137 MCG tablet     No current facility-administered medications for this visit.        ALLERGIES:  Allergies   Allergen Reactions     Contrast Dye      respiratory     Morphine Anaphylaxis     Penicillins Anaphylaxis     \"ANAPHYLACTIC\" ER     Topamax [Topiramate] Other (See Comments)     Stomach burning and rapid heart rate       Celexa [Citalopram] Itching, Swelling and Rash     Tongue and face numbness, couldn't taste right,  tongue swelling and itchy rash     Prozac [Fluoxetine] Itching, Swelling and Rash     Tongue and face numbness, couldn't taste right, tongue swelling and itchy rash       LABS/IMAGING/MEDICAL RECORDS REVIEW:     PHYSICAL EXAM:  /76  Pulse 68  Temp 98.7  F (37.1  C) (Oral)  Ht 1.562 m (5' 1.5\")  Wt 107.8 kg (237 lb 9.6 oz)  LMP 02/20/2015  SpO2 95%  BMI 44.17 kg/m2  General: NAD  Neurologic: A & O x 3, gait normal  Head: normocephalic, atraumatic  HEENT: PERRL, EOMI.   Respiratory: respirations unlabored  Abdomen: Obese, Soft NT ND   Extremities: No LE swelling   Skin: warm and dry.  No rashes on exposed skin  Psychiatric: Mentation and Affect appear normal    In summary, Onofre Robertson has Class III obesity with a body mass index of Body mass index is 44.17 kg/(m^2). kg/m2 and the comorbidities stated above. She completed an informational seminar and is a candidate for the laparoscopic gastric sleeve.  She will have to complete the following pre-requisites:  See dietitian in 1 month  Labs today first floor  Sleep and cardiology " "referrals entered  Bariatric Task List  Status:  Is patient a candidate for bariatric surgery?:  Yes -     Status:  surgery evaluation in process -     Surgeon: Dr Burch -     Tentative surgery month/year: August 2018 -        Insurance: Insurance:  [facility] -        Patient Info: Initial Weight:  237 lb 9.6 oz -     Date of Initial Weight/Height:  2/14/2018 -     Goal Weight (lbs):  227 -     Required Weight Loss:  10 -     Surgery Type:  sleeve gastrectomy -        Dietician Visits: Structured weight loss required by insurance?:  Yes -     Dietician Visit 1:  Completed -     Dietician Visit 2:  Needed -     Dietician Visit 3:  Needed -     Dietician Visit 4:  Needed -     Dietician Visit 5:  Needed -     Dietician Visit 6:  Needed -        Psychological Evaluation: Psych eval:  Needed -        Lab Work: Complete Blood Count:  Needed -     Comprehensive Metabolic Panel:  Needed -     Vitamin D:  Needed -     Hgb A1c:  Needed -     PTH:  Needed -        Consults/ Clearance: Sleep Medicine:  Needed -     Cardiac:  Needed -        PCP: Establish care with PCP:  Completed -     PCP letter of support:  Needed -        Patient Education:  Information Session:  Completed -     Given \"Making your decision\" handout?:  Yes -     Given support group information?:  Yes -     Support plan in place?:  Completed -     Research consents signed?:  Yes -        Final Tasks:  Before surgery online class:  Needed -     Before surgery online class website link:  [email or web address]   After surgery online class:  Needed -     After surgery online class website link:  [email or web address]   Nurse visit for weigh-in and information:  Needed -     Pre-assessment clinic visit with anesthesia team for H&P:  Needed -     Final labs (Hgb, plt, T&S, UA):  Needed -        Notes:   -       Today in the office we discussed gastric sleeve surgery. Preoperative, perioperative, and postoperative processes, management, " and follow up were addressed.  Risks and benefits were outlined including the risk of death, staple line leak (1-2%), PE, DVT, ulcer, worsening GERD, N/V, stricture, hernia, wound infection, weight regain, and vitamin deficiencies. I emphasized exercise and activity along with appropriate food choice as the main foundation for weight loss with surgery providing surgical reinforcement of this.  All questions were answered.  A goal sheet and support group handout were given to the patient.    Once the patient has completed the requirements in their task list and there are no further recommendations, the pt will be allowed to see the surgeon of her choice for consultation on the laparoscopic gastric sleeve surgery. Patient verbalizes understanding of the process to surgery and expectations for the postoperative period including the need for lifelong lifestyle changes, vitamin supplementation, and laboratory monitoring.     Sincerely,    Rebecca Wright PA-C    I spent a total of 30 minutes face to face with Onofre during today's office visit. Over 50% of this time was spent counseling the patient and/or coordinating care.

## 2018-02-14 NOTE — MR AVS SNAPSHOT
"                  MRN:[id number]                      After Visit Summary   2/14/2018    Onofre Robertson    MRN: [id number]           Visit Information        Provider Department      2/14/2018 1:30 PM Leonie Butler RD [facility]        Care Instructions    GOALS:  Relating To Eating:  Eat slowly (20-30 minutes per meal), chewing foods well (25 chews per bite/applesauce consistency)  9\" Plate method (1/2 plate non-starchy vegetables/fruit, 1/4 plate lean protein, 1/4 plate whole grain starch - no more than 1/2 cup carb/meal)  Eat lean/ low-fat protein at all  meals  Eat 3 meals per day - may use protein shake for breakfast (less than 250 calories, at least 20 gm protein, less than 10 gm sugar)    Relating to beverages:  Reduce caffeine/carbonation/calorie containing beverages  Separate fluids from meals by 30 minutes before, during, and after eating  Drink 48-64 ounces of fluid per day    Relating to dietary supplements:  Start a multivitamin containing iron daily    Relating to activity:  Increase activity level.  Exercise 2-3 days/week for 30 minutes    Leonie Butler RD, LD  Follow up in one month  If you need to schedule or reschedule with a dietitian please call [phone].           [employer] Information     [employer] gives you secure access to your electronic health record. If you see a primary care provider, you can also send messages to your care team and make appointments. If you have questions, please call your primary care clinic.  If you do not have a primary care provider, please call [phone] and they will assist you.      [employer] is an electronic gateway that provides easy, online access to your medical records. With [employer], you can request a clinic appointment, read your test results, renew a prescription or communicate with your care team.     To access your existing account, please contact your [facility] or call [phone] for " assistance.        Care EveryWhere ID     This is your Care EveryWhere ID. This could be used by other organizations to access your [facility] medical records  [id number]        Equal Access to Services     FRANCESCA SOW : Pat Long, michael dangelo, christina skinner, teagan hinton. So [facility] [phone].    ATENCIÓN: Si habla español, tiene a martinez disposición servicios gratuitos de asistencia lingüística. Llame al [phone].    We comply with applicable federal civil rights laws and [place] laws. We do not discriminate on the basis of race, color, national origin, age, disability, sex, sexual orientation, or gender identity.

## 2018-02-14 NOTE — LETTER
"2018       RE: Onofre Robertson  [address] [facility] [address]     Dear Colleague,    Thank you for referring your patient, Onofre Robertson, to the [facility] SURGICAL WEIGHT MANAGEMENT at [facility]. Please see a copy of my visit note below.    New Bariatric Surgery Consultation Note    RE: Onofre Robertson  MR#: [id number]  : 1960      Referring provider: Dr Jacob Nicole    Chief Complaint/Reason for visit: evaluation for possible weight loss surgery    Dear Corey, Jacob Ventura MD (General),    I had the pleasure of seeing your patient, Onofre Robertson, to evaluate her obesity and consider her for possible weight loss surgery. As you know, Onofre Robertson is 58 year old.  She has a height of 5' 1.5\", a weight of 237 lbs 9.6 oz, and calculated Body mass index is 44.17 kg/(m^2).    HISTORY OF PRESENT ILLNESS:  Weight Loss History Reviewed with Patient 2018   How long have you been overweight? From Middle age and beyond   What is the most that you have ever weighed? 235   What is the most weight you have lost? 30   I have tried the following methods to lose weight Watching portions or calories, Exercise, Weight Watchers, Atkins type diet (low carb/high protein), Paola George, Pre packaged meals ex: Nutrisystem   I have tried the following weight loss medications? (Check all that apply) Topamax/Topiramate   Have you ever had weight loss surgery? No   Saw Dr Rausch 10/30/17 and started topiramate.  She didn't tolerate it and now is interested in weight loss surgery.    CO-MORBIDITIES OF OBESITY INCLUDE:     2018   I have the following co-morbidities associated with obesity: High Blood Pressure, Sleep Apnea, GERD (Reflux), Fatty Liver, Weight Bearing Joint Pain   Do you use a CPAP? No   Sleep apnea symptoms, has not had sleep study  GERD takes omeprazole 40mg daily  Hx of radiation for thyroid cancer in  and started gaining weight    PAST " MEDICAL HISTORY:  Past Medical History:   Diagnosis Date     Anaphylaxis     reaction to penicillin     Arthritis      Esophageal reflux      H pylori ulcer      High cholesterol      History of radiation therapy      Lyme disease      Malignant neoplasm of thyroid gland (H)     Thyroid cancer     PONV (postoperative nausea and vomiting)     n/v postop     Unspecified hypothyroidism        PAST SURGICAL HISTORY:  Past Surgical History:   Procedure Laterality Date     BIOPSY       BREAST SURGERY       C  DELIVERY ONLY       C LIGATE FALLOPIAN TUBE       C NONSPECIFIC PROCEDURE      LT BREAST LUMPECTOMY     C NONSPECIFIC PROCEDURE      thyroidectomy.     COLONOSCOPY       COSMETIC SURGERY       CYSTOSCOPY N/A 2015    Procedure: CYSTOSCOPY;  Surgeon: Denisse Luong DO;  Location: [facility] OR     DAVINCI HYSTERECTOMY TOTAL, BILATERAL SALPINGO-OOPHORECTOMY, COMBINED Bilateral 2015    Procedure: COMBINED DAVINCI HYSTERECTOMY TOTAL, SALPINGO-OOPHORECTOMY;  Surgeon: Denisse Luong DO;  Location: [facility] OR     HC REMOVAL OF TONSILS,<13 Y/O       HYSTERECTOMY, PAP NO LONGER INDICATED       ORTHOPEDIC SURGERY         FAMILY HISTORY:   Family History   Problem Relation Age of Onset     HEART DISEASE Father       61 YO MI X4,COLITIS,DM,OBESITY     Hyperlipidemia Father      GASTROINTESTINAL DISEASE Mother      78 YO STOMACH ULCERS,QUIT DRINKING 12 YEARS AGO     Breast Cancer Mother      Thyroid Disease Mother      Other Cancer Mother      Neurologic Disorder Sister       21 YO EPILEPSY       SOCIAL HISTORY:   Social History Questions Reviewed With Patient 2018   Which best describes your employment status (select all that apply) I work full-time   If you work, what is your occupation?    Which best describes your marital status:    Do you have children? Yes   Who do you have in your support network that can be available to help you for the first 2 weeks after  surgery? [employer]   Who can you count on for support throughout your weight loss surgery journey? [employer]   Can you afford 3 meals a day?  Yes   Can you afford 50-60 dollars a month for vitamins? Yes   2 children    HABITS:     2/14/2018   How often do you drink alcohol? 2-3 times a week   If you do drink alcohol, how many drinks might you have in a day? (one drink = 5 oz. wine, 1 can/bottle of beer, 1 shot liquor) 3 or 4   Do you currently use any of the following Nicotine products? e-cigarettes   Have you ever used any of the following nicotine products? Cigarettes   If you previously used any of these products, what year did you quit? 2011   Have you or are you currently using street drugs or prescription strength medication for which you do not have a prescription for? No   Do you have a history of chemical dependency (alcohol or drug abuse)? No       PSYCHOLOGICAL HISTORY:   Psychological History Reviewed With Patient 2/14/2018   Have you ever attempted suicide? Never.   Have you had thoughts of suicide in the past year? No   Have you ever been hospitalized for mental illness or a suicide attempt? Never.   Do you have a history of chronic pain? No   Have you ever been diagnosed with fibromyalgia? No   Are you currently seeing a therapist or counselor?  No   Are you currently seeing a psychiatrist? No       ROS:     2/14/2018   Skin:  None of the above   HEENT: None of these   Musculoskeletal: Joint Pain, Back pain, Swelling of legs, Arthritis   Cardiovascular: Shortness of breath with activity   Pulmonary: Shortness of breath with activity, Snoring, Awaken from sleep to catch your breath   Gastrointestinal: Reflux   Genitourinary: Stress incontinence (losing urine when coughing, sneezing, etc.)   Hematological: None of the above   Neurological: None of the above   Female only: None of the above       EATING BEHAVIORS:     2/14/2018   Have you or anyone else thought that you had an eating  "disorder? No   Do you currently binge eat (eat a large amount of food in a short time)? Yes   Are you an emotional eater? No   Do you get up to eat after falling asleep? No       EXERCISE:     2/14/2018   How often do you exercise? Less than 1 time per week   What is the duration of your exercise (in minutes)? 20 Minutes   What types of exercise do you do? walking   What keeps you from being more active?  I should be more active but I just have not gotten around to it, Lack of Time, Too tired       MEDICATIONS:  Current Outpatient Prescriptions   Medication     omeprazole (PRILOSEC) 40 MG capsule     lidocaine (LIDODERM) 5 % Patch     ibuprofen (ADVIL,MOTRIN) 800 MG tablet     acetaminophen (TYLENOL) 325 MG tablet     levothyroxine (SYNTHROID) 137 MCG tablet     No current facility-administered medications for this visit.        ALLERGIES:  Allergies   Allergen Reactions     Contrast Dye      respiratory     Morphine Anaphylaxis     Penicillins Anaphylaxis     \"ANAPHYLACTIC\" ER     Topamax [Topiramate] Other (See Comments)     Stomach burning and rapid heart rate       Celexa [Citalopram] Itching, Swelling and Rash     Tongue and face numbness, couldn't taste right,  tongue swelling and itchy rash     Prozac [Fluoxetine] Itching, Swelling and Rash     Tongue and face numbness, couldn't taste right, tongue swelling and itchy rash       LABS/IMAGING/MEDICAL RECORDS REVIEW:     PHYSICAL EXAM:  /76  Pulse 68  Temp 98.7  F (37.1  C) (Oral)  Ht 1.562 m (5' 1.5\")  Wt 107.8 kg (237 lb 9.6 oz)  LMP 02/20/2015  SpO2 95%  BMI 44.17 kg/m2  General: NAD  Neurologic: A & O x 3, gait normal  Head: normocephalic, atraumatic  HEENT: PERRL, EOMI.   Respiratory: respirations unlabored  Abdomen: Obese, Soft NT ND   Extremities: No LE swelling   Skin: warm and dry.  No rashes on exposed skin  Psychiatric: Mentation and Affect appear normal    In summary, Onofre Robertson has Class III obesity with a body mass index of Body " "mass index is 44.17 kg/(m^2). kg/m2 and the comorbidities stated above. She completed an informational seminar and is a candidate for the laparoscopic gastric sleeve.  She will have to complete the following pre-requisites:  See dietitian in 1 month  Labs today first floor  Sleep and cardiology referrals entered  Bariatric Task List  Status:  Is patient a candidate for bariatric surgery?:  Yes -     Status:  surgery evaluation in process -     Surgeon: Dr Burch -     Tentative surgery month/year: August 2018 -        Insurance: Insurance:  [facility] -        Patient Info: Initial Weight:  237 lb 9.6 oz -     Date of Initial Weight/Height:  2/14/2018 -     Goal Weight (lbs):  227 -     Required Weight Loss:  10 -     Surgery Type:  sleeve gastrectomy -        Dietician Visits: Structured weight loss required by insurance?:  Yes -     Dietician Visit 1:  Completed -     Dietician Visit 2:  Needed -     Dietician Visit 3:  Needed -     Dietician Visit 4:  Needed -     Dietician Visit 5:  Needed -     Dietician Visit 6:  Needed -        Psychological Evaluation: Psych eval:  Needed -        Lab Work: Complete Blood Count:  Needed -     Comprehensive Metabolic Panel:  Needed -     Vitamin D:  Needed -     Hgb A1c:  Needed -     PTH:  Needed -        Consults/ Clearance: Sleep Medicine:  Needed -     Cardiac:  Needed -        PCP: Establish care with PCP:  Completed -     PCP letter of support:  Needed -        Patient Education:  Information Session:  Completed -     Given \"Making your decision\" handout?:  Yes -     Given support group information?:  Yes -     Support plan in place?:  Completed -     Research consents signed?:  Yes -        Final Tasks:  Before surgery online class:  Needed -     Before surgery online class website link:  [email or web address]   After surgery online class:  Needed -     After surgery online class website link:  [email or web address]   Nurse visit for weigh-in " and information:  Needed -     Pre-assessment clinic visit with anesthesia team for H&P:  Needed -     Final labs (Hgb, plt, T&S, UA):  Needed -        Notes:   -       Today in the office we discussed gastric sleeve surgery. Preoperative, perioperative, and postoperative processes, management, and follow up were addressed.  Risks and benefits were outlined including the risk of death, staple line leak (1-2%), PE, DVT, ulcer, worsening GERD, N/V, stricture, hernia, wound infection, weight regain, and vitamin deficiencies. I emphasized exercise and activity along with appropriate food choice as the main foundation for weight loss with surgery providing surgical reinforcement of this.  All questions were answered.  A goal sheet and support group handout were given to the patient.    Once the patient has completed the requirements in their task list and there are no further recommendations, the pt will be allowed to see the surgeon of her choice for consultation on the laparoscopic gastric sleeve surgery. Patient verbalizes understanding of the process to surgery and expectations for the postoperative period including the need for lifelong lifestyle changes, vitamin supplementation, and laboratory monitoring.     I spent a total of 30 minutes face to face with Onofre during today's office visit. Over 50% of this time was spent counseling the patient and/or coordinating care.    Sincerely,    Rebecca Wright PA-C

## 2018-02-14 NOTE — PATIENT INSTRUCTIONS
"See dietitian in 1 month  Labs today first floor  Bariatric Task List  Status:  Is patient a candidate for bariatric surgery?:  Yes -     Status:  surgery evaluation in process -     Surgeon: Dr Burch -     Tentative surgery month/year: August 2018 -        Insurance: Insurance:  [facility] -        Patient Info: Initial Weight:  237 lb 9.6 oz -     Date of Initial Weight/Height:  2/14/2018 -     Goal Weight (lbs):  227 -     Required Weight Loss:  10 -     Surgery Type:  sleeve gastrectomy -        Dietician Visits: Structured weight loss required by insurance?:  Yes -     Dietician Visit 1:  Completed -     Dietician Visit 2:  Needed -     Dietician Visit 3:  Needed -     Dietician Visit 4:  Needed -     Dietician Visit 5:  Needed -     Dietician Visit 6:  Needed -        Psychological Evaluation: Psych eval:  Needed -        Lab Work: Complete Blood Count:  Needed -     Comprehensive Metabolic Panel:  Needed -     Vitamin D:  Needed -     Hgb A1c:  Needed -     PTH:  Needed -        Consults/ Clearance: Sleep Medicine:  Needed -     Cardiac:  Needed -        PCP: Establish care with PCP:  Completed -     PCP letter of support:  Needed -        Patient Education:  Information Session:  Completed -     Given \"Making your decision\" handout?:  Yes -     Given support group information?:  Yes -     Support plan in place?:  Completed -     Research consents signed?:  Yes -        Final Tasks:  Before surgery online class:  Needed -     Before surgery online class website link:  [email or web address]   After surgery online class:  Needed -     After surgery online class website link:  [email or web address]   Nurse visit for weigh-in and information:  Needed -     Pre-assessment clinic visit with anesthesia team for H&P:  Needed -     Final labs (Hgb, plt, T&S, UA):  Needed -        Notes:   -         "

## 2018-02-15 LAB — DEPRECATED CALCIDIOL+CALCIFEROL SERPL-MC: 10 UG/L (ref 20–75)

## 2018-02-27 ENCOUNTER — OFFICE VISIT (OUTPATIENT)
Dept: SLEEP MEDICINE | Facility: CLINIC | Age: 58
End: 2018-02-27
Payer: COMMERCIAL

## 2018-02-27 VITALS
SYSTOLIC BLOOD PRESSURE: 113 MMHG | HEART RATE: 71 BPM | OXYGEN SATURATION: 95 % | RESPIRATION RATE: 10 BRPM | DIASTOLIC BLOOD PRESSURE: 70 MMHG | BODY MASS INDEX: 44.75 KG/M2 | HEIGHT: 61 IN | WEIGHT: 237 LBS

## 2018-02-27 DIAGNOSIS — F51.04 PSYCHOPHYSIOLOGICAL INSOMNIA: ICD-10-CM

## 2018-02-27 DIAGNOSIS — R06.81 WITNESSED EPISODE OF APNEA: ICD-10-CM

## 2018-02-27 DIAGNOSIS — E66.01 MORBID OBESITY (H): ICD-10-CM

## 2018-02-27 DIAGNOSIS — R06.83 SNORING: Primary | ICD-10-CM

## 2018-02-27 DIAGNOSIS — R06.89 GASPING FOR BREATH: ICD-10-CM

## 2018-02-27 PROCEDURE — 99244 OFF/OP CNSLTJ NEW/EST MOD 40: CPT | Performed by: FAMILY MEDICINE

## 2018-02-27 RX ORDER — ZOLPIDEM TARTRATE 5 MG/1
TABLET ORAL
Qty: 1 TABLET | Refills: 0 | Status: SHIPPED | OUTPATIENT
Start: 2018-02-27 | End: 2019-08-05

## 2018-02-27 NOTE — PATIENT INSTRUCTIONS
"MY TREATMENT INFORMATION FOR SLEEP APNEA -  Onofre Robertson    DOCTOR: Neno Cross MD, MPH  SLEEP CENTER : [facility]         If I haven't had a sleep study yet, what can I expect?  A personal story from [place]  https://www.News Republicube.com/watch?v=AxPLmlRpnCs      Am I having a home sleep study?  Here is a video in case you get home and want to make sure you have done it correctly  https://www.News Republicube.com/watch?v=VTL8A5lJcy1&feature=youtu.be      Frequently asked questions:  1. What is Obstructive Sleep Apnea (CADY)? CADY is the most common type of sleep apnea. Apnea literally means, \"without breath.\" It is characterized by repetitive pauses in breathing, despite continued effort to breathe, and is usually associated with a reduction in blood oxygen saturation. Apneas can last 10 to over 60 seconds. It is caused by narrowing or collapse of the upper airway as muscles relax during sleep. Severity of sleep apnea is determined by frequency of breathing events and their effect on your sleep and oxygen levels determined during sleep testing.     2. What are the consequences of CADY? Symptoms include: daytime sleepiness- possibly increasing the risk of falling asleep while driving, unrefreshing/restless sleep, snoring, insomnia, waking frequently to urinate, waking with heartburn or reflux, reduced concentration and memory, and morning headaches. Other health consequences may include development of high blood pressure and other cardiovascular disease in persons who are susceptible. Untreated CADY  can contribute to heart disease, stroke and diabetes.     3. What are the treatment options? In most situations, sleep apnea is a lifelong disease that must be managed with daily therapy. Medications are not effective for sleep apnea and surgery is generally not performed until other therapies have been tried. Therapy is usually tailored to the individual patient based on many factors including your wishes as " well as severity of sleep apnea and severity of obesity. Continuous Positive Airway (CPAP) is the most reliable treatment. An oral device to hold your jaw forward is usually the next most reliable option. Other options include postioning devices (to keep you off your back), weight loss, and surgery including a tongue pacing device. There is more detail about some of these options below.            1. CPAP-  WHAT DOES IT DO AND HOW CAN I LEARN TO WEAR IT?                               BEFORE I START, CAN I WATCH A MOVIE TO GET A PLAN ON HOW TO USE CPAP?  https://www.Innovate/Protect.com/watch?o=i0C72nm905T      Continuous positive airway pressure, or CPAP, is the most effective treatment for obstructive sleep apnea. It works by blowing room air, through a mask, to hold your throat open. A decision to use CPAP is a major step forward in the pursuit of a healthier life. The successful use of CPAP will help you breathe easier, sleep better and live healthier. You can choose CPAP equipment from any durable medical equipment provider that meets your needs.  Using CPAP can be a positive experience if you keep these melendez points in mind:  1. Commitment  CPAP is not a quick fix for your problem. It involves a long-term commitment to improve your sleep and your health.    2. Communication  Stay in close communication with both your sleep doctor and your CPAP supplier. Ask lots of questions and seek help when you need it.    3. Consistency  Use CPAP all night, every night and for every nap. You will receive the maximum health benefits from CPAP when you use it every time that you sleep. This will also make it easier for your body to adjust to the treatment.    4. Correction  The first machine and mask that you try may not be the best ones for you. Work with your sleep doctor and your CPAP supplier to make corrections to your equipment selection. Ask about trying a different type of machine or mask if you have ongoing problems. Make sure  "that your mask is a good fit and learn to use your equipment properly.    5. Challenge  Tell a family member or close friend to ask you each morning if you used your CPAP the previous night. Have someone to challenge you to give it your best effort.    6. Connection   Your adjustment to CPAP will be easier if you are able to connect with others who use the same treatment. Ask your sleep doctor if there is a support group in your area for people who have sleep apnea, or look for one on the Internet.  7. Comfort   Increase your level of comfort by using a saline spray, decongestant or heated humidifier if CPAP irritates your nose, mouth or throat. Use your unit's \"ramp\" setting to slowly get used to the air pressure level. There may be soft pads you can buy that will fit over your mask straps. Look on www.CPAP.com for accessories that can help make CPAP use more comfortable.  8. Cleaning   Clean your mask, tubing and headgear on a regular basis. Put this time in your schedule so that you don't forget to do it. Check and replace the filters for your CPAP unit and humidifier.    9. Completion   Although you are never finished with CPAP therapy, you should reward yourself by celebrating the completion of your first month of treatment. Expect this first month to be your hardest period of adjustment. It will involve some trial and error as you find the machine, mask and pressure settings that are right for you.    10. Continuation  After your first month of treatment, continue to make a daily commitment to use your CPAP all night, every night and for every nap.    CPAP-Tips to starting with success:  Begin using your CPAP for short periods of time during the day while you watch TV or read.    Use CPAP every night and for every nap. Using it less often reduces the health benefits and makes it harder for your body to get used to it.    Make small adjustments to your mask, tubing, straps and headgear until you get the right " fit. Tightening the mask may actually worsen the leak.  If it leaves significant marks on your face or irritates the bridge of your nose, it may not be the best mask for you.  Speak with the person who supplied the mask and consider trying other masks. Insurances will allow you to try different masks during the first month of starting CPAP.  Insurance also covers a new mask, hose and filter about every 6 months.    Use a saline nasal spray to ease mild nasal congestion. Neti-Pot or saline nasal rinses may also help. Nasal gel sprays can help reduce nasal dryness.  Biotene mouthwash can be helpful to protect your teeth if you experience frequent dry mouth.  Dry mouth may be a sign of air escaping out of your mouth or out of the mask in the case of a full face mask.  Speak with your provider if you expect that is the case.     Take a nasal decongestant to relieve more severe nasal or sinus congestion.  Do not use Afrin (oxymetazoline) nasal spray more than 3 days in a row.  Speak with your sleep doctor if your nasal congestion is chronic.    Use a heated humidifier that fits your CPAP model to enhance your breathing comfort. Adjust the heat setting up if you get a dry nose or throat, down if you get condensation in the hose or mask.  Position the CPAP lower than you so that any condensation in the hose drains back into the machine rather than towards the mask.    Try a system that uses nasal pillows if traditional masks give you problems.    Clean your mask, tubing and headgear once a week. Make sure the equipment dries fully.    Regularly check and replace the filters for your CPAP unit and humidifier.    Work closely with your sleep provider and your CPAP supplier to make sure that you have the machine, mask and air pressure setting that works best for you. It is better to stop using it and call your provider to solve problems than to lay awake all night frustrated with the device.      BESIDES CPAP, WHAT OTHER  THERAPIES ARE THERE?        Positioning Device  Positioning devices are generally used when sleep apnea is mild and only occurs on your back.This example shows a pillow that straps around the waist. It may be appropriate for those whose sleep study shows milder sleep apnea that occurs primarily when lying flat on one's back. Preliminary studies have shown benefit but effectiveness at home may need to be verified by a home sleep test. These devices are generally not covered by medical insurance.                        Oral Appliance  What is oral appliance therapy?  An oral appliance is a small acrylic device that fits over the upper and lower teeth or tongue (similar to an orthodontic retainer or a mouth guard). This device slightly advances the lower jaw or tongue, which moves the base of the tongue forward, opens the airway, improves breathing and can effectively treat snoring and obstructive sleep apnea sleep apnea. The appliance is fabricated and customized by a qualified dentist with experience in treating snoring and sleep apnea. Oral appliances are usually well tolerated and have relatively high compliance by patients1, 2, 3.  When is an oral appliance indicated?  Oral appliance therapy is recommended as a first-line treatment for patients with primary snoring, mild sleep apnea, and for patients with moderate sleep apnea who prefer appliance therapy to use of CPAP4, 5. Severity of sleep apnea is determined by sleep testing and is based on the number of respiratory events per hour of sleep.   How successful is oral appliance therapy?  The success rate of oral appliance therapy in patients with mild sleep apnea is 75-80% while in patients with moderate sleep apnea it is 50-70%. The chance of success in patients with severe sleep apnea is 40-50%. The research also shows that oral appliances have a beneficial effect on the cardiovascular health of CADY patients at the same magnitude as CPAP therapy7.  Oral  appliances should be a second-line treatment in cases of severe sleep apnea, but if not completely successful then a combination therapy utilizing CPAP plus oral appliance therapy may be effective. Oral appliances tend to be effective in a broad range of patients although studies show that the patients who have the highest success are females, younger patients, those with milder disease, and less severe obesity. 3, 6.   The chances of success are lower in patients who have more severe CADY, are older, and those who are morbidly obese.     Example of an oral appliance   Finding a dentist that practices dental sleep medicine  Specific training is available through the American Academy of Dental Sleep Medicine for dentists interested in working in the field of sleep. To find a dentist who is educated in the field of sleep and the use of oral appliances, near you, visit the Web site of the American Academy of Dental Sleep Medicine; also see   http://www.accpstorage.org/newOrganization/patients/oralAppliances.pdf  To search for a dentist certified in these practices:  Http://aadsm.org/FindADentist.aspx?1  1. Abdiel et al. Objectively measured vs self-reported compliance during oral appliance therapy for sleep-disordered breathing. Chest 2013; 144(5): 0797-3207.  2. Issa et al. Objective measurement of compliance during oral appliance therapy for sleep-disordered breathing. Thorax 2013; 68(1): 91-96.  3. Leigha, et al. Mandibular advancement devices in 620 men and women with CADY and snoring: tolerability and predictors of treatment success. Chest 2004; 125: 7695-9132.  4. Danny, et al. Oral appliances for snoring and CADY: a review. Sleep 2006; 29: 244-262.  5. Sharath, et al. Oral appliance treatment for CADY: an update. J Clin Sleep Med 2014; 10(2): 215-227.  6. Dolly et al. Predictors of OSAH treatment outcome. J Dent Res 2007; 86: 7415-9055.      Weight Loss:    Weight management is a personal  decision.  If you are interested in exploring weight loss strategies, the following discussion covers the impact on weight loss on sleep apnea and the approaches that may be successful.    Weight loss decreases severity of sleep apnea in most people with obesity. For those with mild obesity who have developed snoring with weight gain, even 15-30 pound weight loss can improve and occasionally eliminate sleep apnea.  Structured and life-long dietary and health habits are necessary to lose weight and keep healthier weight levels.     Though there may be significant health benefits from weight loss, long-term weight loss is very difficult to achieve- studies show success with dietary management in less than 10% of people. In addition, substantial weight loss may require years of dietary control and may be difficult if patients have severe obesity. In these cases, surgical management may be considered.  Finally, older individuals who have tolerated obesity without health complications may be less likely to benefit from weight loss strategies.    Your BMI is Body mass index is 44.06 kg/(m^2).  Body mass index (BMI) is one way to tell whether you are at a healthy weight, overweight, or obese. It measures your weight in relation to your height.  A BMI of 18.5 to 24.9 is in the healthy range. A person with a BMI of 25 to 29.9 is considered overweight, and someone with a BMI of 30 or greater is considered obese. More than two-thirds of American adults are considered overweight or obese.  Being overweight or obese increases the risk for further weight gain. Excess weight may lead to heart disease and diabetes.  Creating and following plans for healthy eating and physical activity may help you improve your health.  Weight control is part of healthy lifestyle and includes exercise, emotional health, and healthy eating habits. Careful eating habits lifelong are the mainstay of weight control. Though there are significant health  benefits from weight loss, long-term weight loss with diet alone may be very difficult to achieve- studies show long-term success with dietary management in less than 10% of people. Attaining a healthy weight may be especially difficult to achieve in those with severe obesity. In some cases, medications, devices and surgical management might be considered.  What can you do?  If you are overweight or obese and are interested in methods for weight loss, you should discuss this with your provider.     Consider reducing daily calorie intake by 500 calories.     Keep a food journal.     Avoiding skipping meals, consider cutting portions instead.    Diet combined with exercise helps maintain muscle while optimizing fat loss. Strength training is particularly important for building and maintaining muscle mass. Exercise helps reduce stress, increase energy, and improves fitness. Increasing exercise without diet control, however, may not burn enough calories to loose weight.       Start walking three days a week 10-20 minutes at a time    Work towards walking thirty minutes five days a week     Eventually, increase the speed of your walking for 1-2 minutes at time    In addition, we recommend that you review healthy lifestyles and methods for weight loss available through the National Institutes of Health patient information sites:  http://win.niddk.nih.gov/publications/index.htm    And look into health and wellness programs that may be available through your health insurance provider, employer, local community center, or luis club.    Weight management plan: Discussed healthy diet and exercise guidelines and patient will follow up in 3 months in clinic to re-evaluate.    Surgery:    Upper Airway Surgery for CADY  Surgery for CADY is a second-line treatment option in the management of sleep apnea.  Surgery should be considered for patients who are having a difficult time tolerating CPAP.    Surgery for CADY is directed at  areas that are responsible for narrowing or complete obstruction of the airway during sleep.  There are a wide range of procedures available to enlarge and/or stabilize the airway to prevent blockage of breathing in the three major areas where it can occur: the palate, tongue, and nasal regions.  Successful surgical treatment depends on the accurate identification of the factors responsible for obstructive sleep apnea in each person.  A personalized approach is required because there is no single treatment that works well for everyone.  Because of anatomic variation, consultation with an examination by a sleep surgeon is a critical first step in determining what surgical options are best for each patient.  In some cases, examination during sedation may be recommended in order to guide the selection of procedures.  Patients will be counseled about risks and benefits as well as the typical recovery course after surgery. Surgery is typically not a cure for a person s CADY.  However, surgery will often significantly improve one s CADY severity (termed  success rate ).  Even in the absence of a cure, surgery will decrease the cardiovascular risk associated with OSA7; improve overall quality of life8 (sleepiness, functionality, sleep quality, etc).          Palate Procedures:  Patients with CADY often have narrowing of their airway in the region of their tonsils and uvula.  The goals of palate procedures are to widen the airway in this region as well as to help the tissues resist collapse.  Modern palate procedure techniques focus on tissue conservation and soft tissue rearrangement, rather than tissue removal.  Often the uvula is preserved in this procedure. Residual sleep apnea is common in patient after pharyngoplasty with an average reduction in sleep apnea events of 33%2.      Tongue Procedures:  While patients are awake, the muscles that surround the throat are active and keep this region open for breathing. These  muscles relax during sleep, allowing the tongue and other structures to collapse and block breathing.  There are several different tongue procedures available.  Selection of a tongue base procedure depends on characteristics seen on physical exam.  Generally, procedures are aimed at removing bulky tissues in this area or preventing the back of the tongue from falling back during sleep.  Success rates for tongue surgery range from 50-62%3.    Hypoglossal Nerve Stimulation:  Hypoglossal nerve stimulation has recently received approval from the [place] Food and Drug Administration for the treatment of obstructive sleep apnea.  This is based on research showing that the system was safe and effective in treating sleep apnea6.  Results showed that the median AHI score decreased 68%, from 29.3 to 9.0. This therapy uses an implant system that senses breathing patterns and delivers mild stimulation to airway muscles, which keeps the airway open during sleep.  The system consists of three fully implanted components: a small generator (similar in size to a pacemaker), a breathing sensor, and a stimulation lead.  Using a small handheld remote, a patient turns the therapy on before bed and off upon awakening.    Candidates for this device must be greater than 22 years of age, have moderate to severe CADY (AHI between 20-65), BMI less than 32, have tried CPAP/oral appliance without success, and have appropriate upper airway anatomy (determined by a sleep endoscopy performed by Dr. Fong).    Hypoglossal Nerve Stimulation Pathway:    The sleep surgeon s office will work with the patient through the insurance prior-authorization process (including communications and appeals).    Nasal Procedures:  Nasal obstruction can interfere with nasal breathing during the day and night.  Studies have shown that relief of nasal obstruction can improve the ability of some patients to tolerate positive airway pressure therapy for obstructive  sleep apnea1.  Treatment options include medications such as nasal saline, topical corticosteroid and antihistamine sprays, and oral medications such as antihistamines or decongestants. Non-surgical treatments can include external nasal dilators for selected patients. If these are not successful by themselves, surgery can improve the nasal airway either alone or in combination with these other options.    Combination Procedures:  Combination of surgical procedures and other treatments may be recommended, particularly if patients have more than one area of narrowing or persistent positional disease.  The success rate of combination surgery ranges from 66-80%2,3.      1. Maico GAVIN. The Role of the Nose in Snoring and Obstructive Sleep Apnoea: An Update.  Eur Arch Otorhinolaryngol. 2011; 268: 1365-73.  2.  Stephen SM; Viviana JA; Ck JR; Pallanch JF; Ashley MB; Maury SG; Mckinley FERREIRA. Surgical modifications of the upper airway for obstructive sleep apnea in adults: a systematic review and meta-analysis. SLEEP 2010;33(10):2318-0082. Sandra DALEY. Hypopharyngeal surgery in obstructive sleep apnea: an evidence-based medicine review.  Arch Otolaryngol Head Neck Surg. 2006 Feb;132(2):206-13.  3. Camron YH1, Ambar Y, Alec BI. The efficacy of anatomically based multilevel surgery for obstructive sleep apnea. Otolaryngol Head Neck Surg. 2003 Oct;129(4):327-35.  4. Sandra DALEY, Goldberg A. Hypopharyngeal Surgery in Obstructive Sleep Apnea: An Evidence-Based Medicine Review. Arch Otolaryngol Head Neck Surg. 2006 Feb;132(2):206-13.  5. Mya GOMEZ et al. Upper-Airway Stimulation for Obstructive Sleep Apnea.  N Engl J Med. 2014 Jan 9;370(2):139-49.  6. Tanisha Y et al. Increased Incidence of Cardiovascular Disease in Middle-aged Men with Obstructive Sleep Apnea. Am J Respir Crit Care Med; 2002 166: 159-165  7. Joselin GONZALES et al. Studying Life Effects and Effectiveness of Palatopharyngoplasty (SLEEP) study: Subjective Outcomes of Isolated  Uvulopalatopharyngoplasty. Otolaryngol Head Neck Surg. 2011; 144: 623-631.  [facility] Insomnia [facility]    Good sleeping habits are a key part of treatment. If needed, and only in very select cases, some medications may help you sleep better at first. Making healthy lifestyle changes and learning to relax can improve your overall sleep in the long run. As many long term changes and treatments, treating insomnia takes commitment and hard work, but trust that your efforts will pay off.  We are recommending treatment of insomnia based on your history of sleep problems and/or use of medications for sleeping.  There are many treatment options available and we want to work with you to find the right treatment for you.  The following recommendations can be helpful for some people.        Sleep Hygiene     Sometimes insomnia can be made worse by our own habits or situation.  You should consider making some of the following changes to help improve your sleep:       If there is excessive noise in your house / neighborhood use a fan or similar device to make a quiet background noise that can drown out other noises.    If your room is too bright early in the morning, hang a blanket or extra curtain over the windows to keep the light out or use a sleeping mask to cover your eyes.    If your room is too warm during the night, set the temperature in the house down a few degrees for the night.    Spend a little time before bedtime trying to relax.  Do not work right up until bedtime.  Take 30-60 minutes to relax and unwind before bedtime with a book or watching TV.    Do not drink anything with alcohol or caffeine in them for at least 4 to 5 hours before bedtime.    Do not smoke right before bedtime or during the night.    No strenuous exercise for 2-3 hours before bedtime.      In addition to the abovementioned recommendations, cognitive behavioral treatment for insomnia (CBTI) is a very effective technique that involves changing  your behaviors and thoughts associated with sleep. In fact, CBTI is the most effective treatment for long-term insomnia. This treatment modality tries to address the underlying causes of your sleep problems, including your habits and how you think about sleep.    People that are motivated to make changes in their sleep pattern are likely to benefit from internet based cognitive behavioral treatment for insomnia. Some internet CBTI programs include but are not limited to wwwSeat 14A or [employer]bist[employer].  There is a fee for using these programs that is similar to actually seeing an insomnia expert. By entering the code  [place]  it will allow us to know if you find these services useful.        Online Programs for CBTI       www.[employer] (pronounced shut eye) - There is a fee for this program.    www.sleepIO.com (pronounced sleep ee oh) - There is a fee for this program. Enter the code  [place]  if you decide to enroll in this program.       In addition to the internet programs provided, self-help strategies for treatment of insomnia can be found also in books.  Several treatment books for insomnia are listed on our patient treatment resources.  Some of these include the following books:       Suggested Resources - Insomnia Treatment Books       Overcoming Insomnia  by Rylan Garcia and Alyssa Farrell (2008)     No More Sleepless Nights  by Jarret Gilbert and Amanda Murray (1996)     Say Jefry to Insomnia  by Magan Mane (2009)     The Insomnia Workbook  by Nadeen Laird and Ruslan Ferguson (2009)     The Insomnia Answer  by Anthony Cooper and Khadar Hernnadez (2006)     These recommendations are a first step in treating insomnia.  For many people these recommendations can be helpful while for others they are just the start. There are many treatments available for insomnia and our goal is to keep re-evaluating your progress and try other options if these first steps are not successful.  It has been  demonstrated that, in the long run, CBTI modalities are more effective than any medication for insomnia. As such, if the above the recommendations do not help, you may benefit from scheduling an appointment with an insomnia expert (we can assess this during follow up visits).      Healthy Lifestyle  As it was mentioned, your lifestyle directly affects your health and your sleep patterns. Here are some healthy habits that you should consider:    Keep a regular sleep schedule, always going to bed and getting up at the same time each day.    Exercise regularly. It may help you reduce stress. However, remember to avoid strenuous exercise for two to four hours before bedtime.    Limit, or if possible, avoid all together naps.    Use your bed only for sleep and sex. This means, no reading, watching TV, using tablets or phones, or doing any other activities in bed.    Do not spend too much time in bed trying to fall asleep. If you cannot fall asleep within 20 to 30 minutes, get up and do something relaxing until you become tired and drowsy again. Do not expose yourself to bright lights or perform complex activities (e.g., no house cleaning, watching TV, or working on homework).    Avoid or limit caffeine and nicotine. They can keep you awake at night. Also avoid alcohol. It may help you fall asleep at first, but your sleep will not be restful.      Before Bedtime  In addition to keeping some healthy habits, it is important to know what do to before going to sleep. As such, to also sleep better every night, try these tips:    Have a bedtime routine to let your body and mind know when it is time to sleep.    Going to bed should be relaxing so try to do only relaxing things around bedtime. Sleep will come sooner.    If your worries do not let you sleep, write them down in a diary. Then close it and go to bed.    As previously mentioned, make sure the room is not too hot or too cold. If it is not dark enough, an eye mask may  help. If it is noisy, try using earplugs.      Learn to Relax  In addition to poor habits, stress, anxiety, and body tension may play a large role in your inability to sleep through the night. In fact, these factors may keep you awake at night. To unwind before bedtime, try reading a book, meditation, or even yoga. Also, try the following:    Deep breathing: Sit or lie back in a chair. Take a slow, deep breath. Hold it for 5 counts. Then breathe out slowly through your mouth. Keep doing this until you feel relaxed.    Imagery: Think of the last fun trip you took. In your mind, walk through the trip from start to finish. Put as much detail into the memory as you can remember. It will also help you relax.      Suggested Resources    Insomnia Treatment Books      Overcoming Insomnia  by Rylan Garcia and Alyssa Farrell (2008)     No More Sleepless Nights  by Jarret Gilbert and Amanda uMrray (1996)     Say Jefry to Insomnia  by Magan Mane (2009)     The Insomnia Workbook  by Nadeen Laird and Ruslan Ferguson (2009)     The Insomnia Answer  by Anthony Cooper and Khadar Hernandez (2006)    Stress Management and Relaxation Books    The Relaxation and Stress Reduction Workbook by Kanwal Ignacio, Rosa Washington and Wilfrid Johnson (2008)    Stress Management Workbook: Techniques and Self-Assessment Procedures by Amanda Tapia and Minor Oneill (1997)    A Mindfulness-Based Stress Reduction Workbook by Italo Lopez and Che Evans (2010)    The Complete Stress Management Workbook by Timothy Maritnez, Setve Mendoza and Lan Monroy (1996)    Assert Yourself by Sonja Pratt and Anurag Pratt (1977)    Relaxation Resources for Computer Download   These websites offer resources to help you relax. This list is for information only. [facility] is not responsible for the quality of services or the actions of any person or organization.    Progressive Muscle Relaxation (PMR):      http://www.[employer]/progressive-muscle-relaxation-exercise.html     http://studentsupport.[facility]/counseling/resources/self-help/relaxation-and-stress-management/     Deep Breathing Exercises:    http://www.[employer]/breathing-awareness.html     Meditation:     wwwSite[employer]    www.TangoguidedUsingMilesmeditationUsingMilessite.[employer]egris (you may have to pay for some of these resources)    Guided Imagery:    http://www.[employer]/guided-imagery-scripts.html     http://[email or web address]/library/buvnnnancz-oybhei-mcdjnql/     Counseling / Behavioral Health    [facility]    Visit [email or web address] or call [phone] to find a clinic close to you.      This is not a prescription and these resources are optional. You must pay for any costs when using these resources. Please ask your insurance carrier if you can be reimbursed for these resources. If so, you are responsible for sending the needed details to your insurance carrier. These resources may also be tax deductible as medical expenses. Check with your .     These programs and publications are not affiliated in any way with [facility].    Stress, tension, anxiety and depression can be big problems that contribute to insomnia.  If you can t relax your mind and body, then you won t be able to sleep.  You seem to have a high level of stress in your life and would likely benefit from professional stress reduction / anxiety management strategies and should contact [facility] at [phone] to schedule an appointment.       If you are experiencing extreme anxiety or distress due to insomnia symptoms and feel that you need immediate assistance, please contact the [facility] at [phone].    Please, schedule sleep study and follow up visit with the nurse (she will coming into the room and meet with you). Use sleeping aid only if needed during the night  of the study.    Thank you!    Neno Cross MD, MPH  Clinical Sleep and Occupational / Environmental Medicine
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
## 2018-02-27 NOTE — NURSING NOTE
"Chief Complaint   Patient presents with     Consult     referral for bariactric surgery, snores, has woke up feeling like she is holding her breath, night owl, wakes up during night .       Initial /70  Pulse 71  Resp 10  Ht 1.562 m (5' 1.5\")  Wt 107.5 kg (237 lb)  LMP 02/20/2015  SpO2 95%  BMI 44.06 kg/m2 Estimated body mass index is 44.06 kg/(m^2) as calculated from the following:    Height as of this encounter: 1.562 m (5' 1.5\").    Weight as of this encounter: 107.5 kg (237 lb).  Medication Reconciliation: complete     ESS 4    Neck circumference 38 cm, 15 inches    Cristina Benton, Sleep Clinic specialist  "

## 2018-02-27 NOTE — MR AVS SNAPSHOT
"              After Visit Summary   2/27/2018    Onofre Robertson    MRN: [id number]           Patient Information     Date Of Birth          1960        Visit Information        Provider Department      2/27/2018 3:30 PM Neno Cross MD [facility] [place]        Today's Diagnoses     Snoring    -  1    Morbid obesity (H)        Witnessed episode of apnea        Gasping for breath        Psychophysiological insomnia          Care Instructions    MY TREATMENT INFORMATION FOR SLEEP APNEA -  Onofremitzi Robertson    DOCTOR: Neno Cross MD, MPH  SLEEP CENTER : [facility]         If I haven't had a sleep study yet, what can I expect?  A personal story from [employer]  https://www.[employer].com/watch?v=AxPLmlRpnCs      Am I having a home sleep study?  Here is a video in case you get home and want to make sure you have done it correctly  https://www.Heyday.com/watch?v=YPI1K1qOvm7&feature=youtu.be      Frequently asked questions:  1. What is Obstructive Sleep Apnea (CADY)? CADY is the most common type of sleep apnea. Apnea literally means, \"without breath.\" It is characterized by repetitive pauses in breathing, despite continued effort to breathe, and is usually associated with a reduction in blood oxygen saturation. Apneas can last 10 to over 60 seconds. It is caused by narrowing or collapse of the upper airway as muscles relax during sleep. Severity of sleep apnea is determined by frequency of breathing events and their effect on your sleep and oxygen levels determined during sleep testing.     2. What are the consequences of CADY? Symptoms include: daytime sleepiness- possibly increasing the risk of falling asleep while driving, unrefreshing/restless sleep, snoring, insomnia, waking frequently to urinate, waking with heartburn or reflux, reduced concentration and memory, and morning headaches. Other health consequences may include development of high blood pressure and other " cardiovascular disease in persons who are susceptible. Untreated CADY  can contribute to heart disease, stroke and diabetes.     3. What are the treatment options? In most situations, sleep apnea is a lifelong disease that must be managed with daily therapy. Medications are not effective for sleep apnea and surgery is generally not performed until other therapies have been tried. Therapy is usually tailored to the individual patient based on many factors including your wishes as well as severity of sleep apnea and severity of obesity. Continuous Positive Airway (CPAP) is the most reliable treatment. An oral device to hold your jaw forward is usually the next most reliable option. Other options include postioning devices (to keep you off your back), weight loss, and surgery including a tongue pacing device. There is more detail about some of these options below.            1. CPAP-  WHAT DOES IT DO AND HOW CAN I LEARN TO WEAR IT?                               BEFORE I START, CAN I WATCH A MOVIE TO GET A PLAN ON HOW TO USE CPAP?  https://www.Cinemagram.com/watch?d=a3C34em064T      Continuous positive airway pressure, or CPAP, is the most effective treatment for obstructive sleep apnea. It works by blowing room air, through a mask, to hold your throat open. A decision to use CPAP is a major step forward in the pursuit of a healthier life. The successful use of CPAP will help you breathe easier, sleep better and live healthier. You can choose CPAP equipment from any durable medical equipment provider that meets your needs.  Using CPAP can be a positive experience if you keep these melendez points in mind:  1. Commitment  CPAP is not a quick fix for your problem. It involves a long-term commitment to improve your sleep and your health.    2. Communication  Stay in close communication with both your sleep doctor and your CPAP supplier. Ask lots of questions and seek help when you need it.    3. Consistency  Use CPAP all night,  "every night and for every nap. You will receive the maximum health benefits from CPAP when you use it every time that you sleep. This will also make it easier for your body to adjust to the treatment.    4. Correction  The first machine and mask that you try may not be the best ones for you. Work with your sleep doctor and your CPAP supplier to make corrections to your equipment selection. Ask about trying a different type of machine or mask if you have ongoing problems. Make sure that your mask is a good fit and learn to use your equipment properly.    5. Challenge  Tell a family member or close friend to ask you each morning if you used your CPAP the previous night. Have someone to challenge you to give it your best effort.    6. Connection   Your adjustment to CPAP will be easier if you are able to connect with others who use the same treatment. Ask your sleep doctor if there is a support group in your area for people who have sleep apnea, or look for one on the Internet.  7. Comfort   Increase your level of comfort by using a saline spray, decongestant or heated humidifier if CPAP irritates your nose, mouth or throat. Use your unit's \"ramp\" setting to slowly get used to the air pressure level. There may be soft pads you can buy that will fit over your mask straps. Look on www.CPAP.com for accessories that can help make CPAP use more comfortable.  8. Cleaning   Clean your mask, tubing and headgear on a regular basis. Put this time in your schedule so that you don't forget to do it. Check and replace the filters for your CPAP unit and humidifier.    9. Completion   Although you are never finished with CPAP therapy, you should reward yourself by celebrating the completion of your first month of treatment. Expect this first month to be your hardest period of adjustment. It will involve some trial and error as you find the machine, mask and pressure settings that are right for you.    10. Continuation  After your " first month of treatment, continue to make a daily commitment to use your CPAP all night, every night and for every nap.    CPAP-Tips to starting with success:  Begin using your CPAP for short periods of time during the day while you watch TV or read.    Use CPAP every night and for every nap. Using it less often reduces the health benefits and makes it harder for your body to get used to it.    Make small adjustments to your mask, tubing, straps and headgear until you get the right fit. Tightening the mask may actually worsen the leak.  If it leaves significant marks on your face or irritates the bridge of your nose, it may not be the best mask for you.  Speak with the person who supplied the mask and consider trying other masks. Insurances will allow you to try different masks during the first month of starting CPAP.  Insurance also covers a new mask, hose and filter about every 6 months.    Use a saline nasal spray to ease mild nasal congestion. Neti-Pot or saline nasal rinses may also help. Nasal gel sprays can help reduce nasal dryness.  Biotene mouthwash can be helpful to protect your teeth if you experience frequent dry mouth.  Dry mouth may be a sign of air escaping out of your mouth or out of the mask in the case of a full face mask.  Speak with your provider if you expect that is the case.     Take a nasal decongestant to relieve more severe nasal or sinus congestion.  Do not use Afrin (oxymetazoline) nasal spray more than 3 days in a row.  Speak with your sleep doctor if your nasal congestion is chronic.    Use a heated humidifier that fits your CPAP model to enhance your breathing comfort. Adjust the heat setting up if you get a dry nose or throat, down if you get condensation in the hose or mask.  Position the CPAP lower than you so that any condensation in the hose drains back into the machine rather than towards the mask.    Try a system that uses nasal pillows if traditional masks give you  problems.    Clean your mask, tubing and headgear once a week. Make sure the equipment dries fully.    Regularly check and replace the filters for your CPAP unit and humidifier.    Work closely with your sleep provider and your CPAP supplier to make sure that you have the machine, mask and air pressure setting that works best for you. It is better to stop using it and call your provider to solve problems than to lay awake all night frustrated with the device.      BESIDES CPAP, WHAT OTHER THERAPIES ARE THERE?        Positioning Device  Positioning devices are generally used when sleep apnea is mild and only occurs on your back.This example shows a pillow that straps around the waist. It may be appropriate for those whose sleep study shows milder sleep apnea that occurs primarily when lying flat on one's back. Preliminary studies have shown benefit but effectiveness at home may need to be verified by a home sleep test. These devices are generally not covered by medical insurance.                        Oral Appliance  What is oral appliance therapy?  An oral appliance is a small acrylic device that fits over the upper and lower teeth or tongue (similar to an orthodontic retainer or a mouth guard). This device slightly advances the lower jaw or tongue, which moves the base of the tongue forward, opens the airway, improves breathing and can effectively treat snoring and obstructive sleep apnea sleep apnea. The appliance is fabricated and customized by a qualified dentist with experience in treating snoring and sleep apnea. Oral appliances are usually well tolerated and have relatively high compliance by patients1, 2, 3.  When is an oral appliance indicated?  Oral appliance therapy is recommended as a first-line treatment for patients with primary snoring, mild sleep apnea, and for patients with moderate sleep apnea who prefer appliance therapy to use of CPAP4, 5. Severity of sleep apnea is determined by sleep testing  and is based on the number of respiratory events per hour of sleep.   How successful is oral appliance therapy?  The success rate of oral appliance therapy in patients with mild sleep apnea is 75-80% while in patients with moderate sleep apnea it is 50-70%. The chance of success in patients with severe sleep apnea is 40-50%. The research also shows that oral appliances have a beneficial effect on the cardiovascular health of CADY patients at the same magnitude as CPAP therapy7.  Oral appliances should be a second-line treatment in cases of severe sleep apnea, but if not completely successful then a combination therapy utilizing CPAP plus oral appliance therapy may be effective. Oral appliances tend to be effective in a broad range of patients although studies show that the patients who have the highest success are females, younger patients, those with milder disease, and less severe obesity. 3, 6.   The chances of success are lower in patients who have more severe CADY, are older, and those who are morbidly obese.     Example of an oral appliance   Finding a dentist that practices dental sleep medicine  Specific training is available through the American Academy of Dental Sleep Medicine for dentists interested in working in the field of sleep. To find a dentist who is educated in the field of sleep and the use of oral appliances, near you, visit the Web site of the American Academy of Dental Sleep Medicine; also see   http://www.accpstorage.org/newOrganization/patients/oralAppliances.pdf  To search for a dentist certified in these practices:  Http://aadsm.org/FindADentist.aspx?1  1. Abdiel, et al. Objectively measured vs self-reported compliance during oral appliance therapy for sleep-disordered breathing. Chest 2013; 144(5): 1807-2618.  2. Issa et al. Objective measurement of compliance during oral appliance therapy for sleep-disordered breathing. Thorax 2013; 68(1): 91-96.  3. Leigha et al. Mandibular  advancement devices in 620 men and women with CADY and snoring: tolerability and predictors of treatment success. Chest 2004; 125: 7367-3521.  4. Danny et al. Oral appliances for snoring and CADY: a review. Sleep 2006; 29: 244-262.  5. Sharath et al. Oral appliance treatment for CADY: an update. J Clin Sleep Med 2014; 10(2): 215-227.  6. Dolly et al. Predictors of OSAH treatment outcome. J Dent Res 2007; 86: 4958-7114.      Weight Loss:    Weight management is a personal decision.  If you are interested in exploring weight loss strategies, the following discussion covers the impact on weight loss on sleep apnea and the approaches that may be successful.    Weight loss decreases severity of sleep apnea in most people with obesity. For those with mild obesity who have developed snoring with weight gain, even 15-30 pound weight loss can improve and occasionally eliminate sleep apnea.  Structured and life-long dietary and health habits are necessary to lose weight and keep healthier weight levels.     Though there may be significant health benefits from weight loss, long-term weight loss is very difficult to achieve- studies show success with dietary management in less than 10% of people. In addition, substantial weight loss may require years of dietary control and may be difficult if patients have severe obesity. In these cases, surgical management may be considered.  Finally, older individuals who have tolerated obesity without health complications may be less likely to benefit from weight loss strategies.    Your BMI is Body mass index is 44.06 kg/(m^2).  Body mass index (BMI) is one way to tell whether you are at a healthy weight, overweight, or obese. It measures your weight in relation to your height.  A BMI of 18.5 to 24.9 is in the healthy range. A person with a BMI of 25 to 29.9 is considered overweight, and someone with a BMI of 30 or greater is considered obese. More than two-thirds of American adults  are considered overweight or obese.  Being overweight or obese increases the risk for further weight gain. Excess weight may lead to heart disease and diabetes.  Creating and following plans for healthy eating and physical activity may help you improve your health.  Weight control is part of healthy lifestyle and includes exercise, emotional health, and healthy eating habits. Careful eating habits lifelong are the mainstay of weight control. Though there are significant health benefits from weight loss, long-term weight loss with diet alone may be very difficult to achieve- studies show long-term success with dietary management in less than 10% of people. Attaining a healthy weight may be especially difficult to achieve in those with severe obesity. In some cases, medications, devices and surgical management might be considered.  What can you do?  If you are overweight or obese and are interested in methods for weight loss, you should discuss this with your provider.     Consider reducing daily calorie intake by 500 calories.     Keep a food journal.     Avoiding skipping meals, consider cutting portions instead.    Diet combined with exercise helps maintain muscle while optimizing fat loss. Strength training is particularly important for building and maintaining muscle mass. Exercise helps reduce stress, increase energy, and improves fitness. Increasing exercise without diet control, however, may not burn enough calories to loose weight.       Start walking three days a week 10-20 minutes at a time    Work towards walking thirty minutes five days a week     Eventually, increase the speed of your walking for 1-2 minutes at time    In addition, we recommend that you review healthy lifestyles and methods for weight loss available through the National Institutes of Health patient information sites:  http://win.niddk.nih.gov/publications/index.htm    And look into health and wellness programs that may be available  through your health insurance provider, employer, local community center, or luis club.    Weight management plan: Discussed healthy diet and exercise guidelines and patient will follow up in 3 months in clinic to re-evaluate.    Surgery:    Upper Airway Surgery for CADY  Surgery for CADY is a second-line treatment option in the management of sleep apnea.  Surgery should be considered for patients who are having a difficult time tolerating CPAP.    Surgery for CADY is directed at areas that are responsible for narrowing or complete obstruction of the airway during sleep.  There are a wide range of procedures available to enlarge and/or stabilize the airway to prevent blockage of breathing in the three major areas where it can occur: the palate, tongue, and nasal regions.  Successful surgical treatment depends on the accurate identification of the factors responsible for obstructive sleep apnea in each person.  A personalized approach is required because there is no single treatment that works well for everyone.  Because of anatomic variation, consultation with an examination by a sleep surgeon is a critical first step in determining what surgical options are best for each patient.  In some cases, examination during sedation may be recommended in order to guide the selection of procedures.  Patients will be counseled about risks and benefits as well as the typical recovery course after surgery. Surgery is typically not a cure for a person s CADY.  However, surgery will often significantly improve one s CADY severity (termed  success rate ).  Even in the absence of a cure, surgery will decrease the cardiovascular risk associated with OSA7; improve overall quality of life8 (sleepiness, functionality, sleep quality, etc).          Palate Procedures:  Patients with CADY often have narrowing of their airway in the region of their tonsils and uvula.  The goals of palate procedures are to widen the airway in this region as well  as to help the tissues resist collapse.  Modern palate procedure techniques focus on tissue conservation and soft tissue rearrangement, rather than tissue removal.  Often the uvula is preserved in this procedure. Residual sleep apnea is common in patient after pharyngoplasty with an average reduction in sleep apnea events of 33%2.      Tongue Procedures:  While patients are awake, the muscles that surround the throat are active and keep this region open for breathing. These muscles relax during sleep, allowing the tongue and other structures to collapse and block breathing.  There are several different tongue procedures available.  Selection of a tongue base procedure depends on characteristics seen on physical exam.  Generally, procedures are aimed at removing bulky tissues in this area or preventing the back of the tongue from falling back during sleep.  Success rates for tongue surgery range from 50-62%3.    Hypoglossal Nerve Stimulation:  Hypoglossal nerve stimulation has recently received approval from the [place] Food and Drug Administration for the treatment of obstructive sleep apnea.  This is based on research showing that the system was safe and effective in treating sleep apnea6.  Results showed that the median AHI score decreased 68%, from 29.3 to 9.0. This therapy uses an implant system that senses breathing patterns and delivers mild stimulation to airway muscles, which keeps the airway open during sleep.  The system consists of three fully implanted components: a small generator (similar in size to a pacemaker), a breathing sensor, and a stimulation lead.  Using a small handheld remote, a patient turns the therapy on before bed and off upon awakening.    Candidates for this device must be greater than 22 years of age, have moderate to severe CADY (AHI between 20-65), BMI less than 32, have tried CPAP/oral appliance without success, and have appropriate upper airway anatomy (determined by a sleep  endoscopy performed by Dr. Fong).    Hypoglossal Nerve Stimulation Pathway:    The sleep surgeon s office will work with the patient through the insurance prior-authorization process (including communications and appeals).    Nasal Procedures:  Nasal obstruction can interfere with nasal breathing during the day and night.  Studies have shown that relief of nasal obstruction can improve the ability of some patients to tolerate positive airway pressure therapy for obstructive sleep apnea1.  Treatment options include medications such as nasal saline, topical corticosteroid and antihistamine sprays, and oral medications such as antihistamines or decongestants. Non-surgical treatments can include external nasal dilators for selected patients. If these are not successful by themselves, surgery can improve the nasal airway either alone or in combination with these other options.    Combination Procedures:  Combination of surgical procedures and other treatments may be recommended, particularly if patients have more than one area of narrowing or persistent positional disease.  The success rate of combination surgery ranges from 66-80%2,3.      1. Maico GAVIN. The Role of the Nose in Snoring and Obstructive Sleep Apnoea: An Update.  Eur Arch Otorhinolaryngol. 2011; 268: 1365-73.  2.  Stephen SM; Viviana JA; Ck JR; Pallanch JF; Ashley MB; Maury SG; Mckinley FERREIRA. Surgical modifications of the upper airway for obstructive sleep apnea in adults: a systematic review and meta-analysis. SLEEP 2010;33(10):9272-1585. Sandra DALEY. Hypopharyngeal surgery in obstructive sleep apnea: an evidence-based medicine review.  Arch Otolaryngol Head Neck Surg. 2006 Feb;132(2):206-13.  3. Camron YH1, Ambar Y, Alec BI. The efficacy of anatomically based multilevel surgery for obstructive sleep apnea. Otolaryngol Head Neck Surg. 2003 Oct;129(4):327-35.  4. Sandra DALEY, Goldberg A. Hypopharyngeal Surgery in Obstructive Sleep Apnea: An Evidence-Based  Medicine Review. Arch Otolaryngol Head Neck Surg. 2006 Feb;132(2):206-13.  5. Mya PJ et al. Upper-Airway Stimulation for Obstructive Sleep Apnea.  N Engl J Med. 2014 Jan 9;370(2):139-49.  6. Tanisha Y et al. Increased Incidence of Cardiovascular Disease in Middle-aged Men with Obstructive Sleep Apnea. Am J Respir Crit Care Med; 2002 166: 159-165  7. Olivera EM et al. Studying Life Effects and Effectiveness of Palatopharyngoplasty (SLEEP) study: Subjective Outcomes of Isolated Uvulopalatopharyngoplasty. Otolaryngol Head Neck Surg. 2011; 144: 623-631.  [facility]    Good sleeping habits are a key part of treatment. If needed, and only in very select cases, some medications may help you sleep better at first. Making healthy lifestyle changes and learning to relax can improve your overall sleep in the long run. As many long term changes and treatments, treating insomnia takes commitment and hard work, but trust that your efforts will pay off.  We are recommending treatment of insomnia based on your history of sleep problems and/or use of medications for sleeping.  There are many treatment options available and we want to work with you to find the right treatment for you.  The following recommendations can be helpful for some people.        Sleep Hygiene     Sometimes insomnia can be made worse by our own habits or situation.  You should consider making some of the following changes to help improve your sleep:       If there is excessive noise in your house / neighborhood use a fan or similar device to make a quiet background noise that can drown out other noises.    If your room is too bright early in the morning, hang a blanket or extra curtain over the windows to keep the light out or use a sleeping mask to cover your eyes.    If your room is too warm during the night, set the temperature in the house down a few degrees for the night.    Spend a little time before bedtime trying to relax.  Do not work  right up until bedtime.  Take 30-60 minutes to relax and unwind before bedtime with a book or watching TV.    Do not drink anything with alcohol or caffeine in them for at least 4 to 5 hours before bedtime.    Do not smoke right before bedtime or during the night.    No strenuous exercise for 2-3 hours before bedtime.      In addition to the abovementioned recommendations, cognitive behavioral treatment for insomnia (CBTI) is a very effective technique that involves changing your behaviors and thoughts associated with sleep. In fact, CBTI is the most effective treatment for long-term insomnia. This treatment modality tries to address the underlying causes of your sleep problems, including your habits and how you think about sleep.    People that are motivated to make changes in their sleep pattern are likely to benefit from internet based cognitive behavioral treatment for insomnia. Some internet CBTI programs include but are not limited to www.Siminars or www.Enhanced Medical Decisions.  There is a fee for using these programs that is similar to actually seeing an insomnia expert. By entering the code  [place]  it will allow us to know if you find these services useful.        Online Programs for CBTI       www.Siminars (pronounced shut eye) - There is a fee for this program.    www.sleepIO.com (pronounced sleep ee oh) - There is a fee for this program. Enter the code  [place]  if you decide to enroll in this program.       In addition to the internet programs provided, self-help strategies for treatment of insomnia can be found also in books.  Several treatment books for insomnia are listed on our patient treatment resources.  Some of these include the following books:       Suggested Resources - Insomnia Treatment Books       Overcoming Insomnia  by Rylan Garcia and Alyssa Farrell (2008)     No More Sleepless Nights  by Jarret Gilbert and Amanda Murray (1996)     Say Jefry to Insomnia  by Magan Mane (2009)     The  Insomnia Workbook  by Nadeen Laird and Ruslan Ferguson (2009)     The Insomnia Answer  by Anthony Cooper and Khadar Hernandez (2006)     These recommendations are a first step in treating insomnia.  For many people these recommendations can be helpful while for others they are just the start. There are many treatments available for insomnia and our goal is to keep re-evaluating your progress and try other options if these first steps are not successful.  It has been demonstrated that, in the long run, CBTI modalities are more effective than any medication for insomnia. As such, if the above the recommendations do not help, you may benefit from scheduling an appointment with an insomnia expert (we can assess this during follow up visits).      Healthy Lifestyle  As it was mentioned, your lifestyle directly affects your health and your sleep patterns. Here are some healthy habits that you should consider:    Keep a regular sleep schedule, always going to bed and getting up at the same time each day.    Exercise regularly. It may help you reduce stress. However, remember to avoid strenuous exercise for two to four hours before bedtime.    Limit, or if possible, avoid all together naps.    Use your bed only for sleep and sex. This means, no reading, watching TV, using tablets or phones, or doing any other activities in bed.    Do not spend too much time in bed trying to fall asleep. If you cannot fall asleep within 20 to 30 minutes, get up and do something relaxing until you become tired and drowsy again. Do not expose yourself to bright lights or perform complex activities (e.g., no house cleaning, watching TV, or working on homework).    Avoid or limit caffeine and nicotine. They can keep you awake at night. Also avoid alcohol. It may help you fall asleep at first, but your sleep will not be restful.      Before Bedtime  In addition to keeping some healthy habits, it is important to know what do to before going to  sleep. As such, to also sleep better every night, try these tips:    Have a bedtime routine to let your body and mind know when it is time to sleep.    Going to bed should be relaxing so try to do only relaxing things around bedtime. Sleep will come sooner.    If your worries do not let you sleep, write them down in a diary. Then close it and go to bed.    As previously mentioned, make sure the room is not too hot or too cold. If it is not dark enough, an eye mask may help. If it is noisy, try using earplugs.      Learn to Relax  In addition to poor habits, stress, anxiety, and body tension may play a large role in your inability to sleep through the night. In fact, these factors may keep you awake at night. To unwind before bedtime, try reading a book, meditation, or even yoga. Also, try the following:    Deep breathing: Sit or lie back in a chair. Take a slow, deep breath. Hold it for 5 counts. Then breathe out slowly through your mouth. Keep doing this until you feel relaxed.    Imagery: Think of the last fun trip you took. In your mind, walk through the trip from start to finish. Put as much detail into the memory as you can remember. It will also help you relax.      Suggested Resources    Insomnia Treatment Books      Overcoming Insomnia  by Rylan Garcia and Alyssa Farrell (2008)     No More Sleepless Nights  by Jarret Gilbert and Amanda Murray (1996)     Say Jefry to Insomnia  by Magan Mane (2009)     The Insomnia Workbook  by Nadeen Laird and Ruslan Ferguson (2009)     The Insomnia Answer  by Anthony Cooper and Khadar Hernandez (2006)    Stress Management and Relaxation Books    The Relaxation and Stress Reduction Workbook by Kanwal Ignacio, Rosa Washington and Wilfrid Johnson (2008)    Stress Management Workbook: Techniques and Self-Assessment Procedures by Amanda Tapia and Minor Oneill (1997)    A Mindfulness-Based Stress Reduction Workbook by Italo Lopez and Che Evans  (2010)    The Complete Stress Management Workbook by Timothy Martinez, Steve Mendoza and Lan Monroy (1996)    Assert Yourself by Sonja Pratt and Anurag Pratt (1977)    Relaxation Resources for Computer Download   These websites offer resources to help you relax. This list is for information only. [facility] is not responsible for the quality of services or the actions of any person or organization.    Progressive Muscle Relaxation (PMR):     http://www.AXSionics/progressive-muscle-relaxation-exercise.html     http://studentsupport.[facility]/counseling/resources/self-help/relaxation-and-stress-management/     Deep Breathing Exercises:    http://www.AXSionics/breathing-awareness.html     Meditation:     wwwShiftgig    www.RECEPTA biopharmaguidedeHarmonymeditationeHarmonysite.Meograph (you may have to pay for some of these resources)    Guided Imagery:    http://www.AXSionics/guided-imagery-scripts.html     http://[employer]/library/tnglsqhqwf-vkrwkr-mzaobxg/     Counseling / Behavioral Health    [facility]    Visit www.[email or web address] or call [phone] to find a clinic close to you.      This is not a prescription and these resources are optional. You must pay for any costs when using these resources. Please ask your insurance carrier if you can be reimbursed for these resources. If so, you are responsible for sending the needed details to your insurance carrier. These resources may also be tax deductible as medical expenses. Check with your .     These programs and publications are not affiliated in any way with [facility].    Stress, tension, anxiety and depression can be big problems that contribute to insomnia.  If you can t relax your mind and body, then you won t be able to sleep.  You seem to have a high level of stress in your life and would likely benefit from professional stress reduction / anxiety management strategies and  should contact [facility] at [phone] to schedule an appointment.       If you are experiencing extreme anxiety or distress due to insomnia symptoms and feel that you need immediate assistance, please contact the [facility] at [phone].    Please, schedule sleep study and follow up visit with the nurse (she will coming into the room and meet with you). Use sleeping aid only if needed during the night of the study.    Thank you!    Neno Cross MD, MPH  Clinical Sleep and Occupational / Environmental Medicine            Follow-ups after your visit        Your next 10 appointments already scheduled     Mar 08, 2018  8:45 AM CST   New Visit with Jacob Gibbs MD   [facility] ([facility])    [address]  [place] [address] [phone]            Mar 14, 2018  8:30 AM CDT   (Arrive by 8:15 AM)   NUTRITION VISIT with Leonie Butler RD   [facility] ([facility])    [address] [address] [phone]              Future tests that were ordered for you today     Open Future Orders        Priority Expected Expires Ordered    ABG-Blood Gas Arterial ([place] / [place]) Routine  4/28/2018 2/27/2018    Comprehensive Sleep Study Routine  8/26/2018 2/27/2018            Who to contact     If you have questions or need follow up information about today's clinic visit or your schedule please contact [facility] directly at [phone].  Normal or non-critical lab and imaging results will be communicated to you by MyChart, letter or phone within 4 business days after the clinic has received the results. If you do not hear from us within 7 days, please contact the clinic through MyChart or phone. If you have a critical or abnormal lab result, we will notify you by phone as soon as  "possible.  Submit refill requests through [employer] or call your pharmacy and they will forward the refill request to us. Please allow 3 business days for your refill to be completed.          Additional Information About Your Visit        [employer] Information     [employer] gives you secure access to your electronic health record. If you see a primary care provider, you can also send messages to your care team and make appointments. If you have questions, please call your primary care clinic.  If you do not have a primary care provider, please call [phone] and they will assist you.        Care EveryWhere ID     This is your Care EveryWhere ID. This could be used by other organizations to access your [facility] medical records  [id number]        Your Vitals Were     Pulse Respirations Height Last Period Pulse Oximetry BMI (Body Mass Index)    71 10 1.562 m (5' 1.5\") 02/20/2015 95% 44.06 kg/m2       Blood Pressure from Last 3 Encounters:   02/27/18 113/70   02/14/18 133/76   10/30/17 142/87    Weight from Last 3 Encounters:   02/27/18 107.5 kg (237 lb)   02/14/18 107.8 kg (237 lb 9.6 oz)   10/30/17 104.5 kg (230 lb 4.8 oz)              We Performed the Following     SLEEP EVALUATION & MANAGEMENT REFERRAL - ADULT -[facility]  [phone] (Age 18 and up)          Today's Medication Changes          These changes are accurate as of 2/27/18  3:56 PM.  If you have any questions, ask your nurse or doctor.               Start taking these medicines.        Dose/Directions    zolpidem 5 MG tablet   Commonly known as:  AMBIEN   Used for:  Morbid obesity (H), Snoring, Psychophysiological insomnia, Gasping for breath, Witnessed episode of apnea        Take tablet by mouth 15 minutes prior to sleep, for Sleep Study   Quantity:  1 tablet   Refills:  0            Where to get your medicines      Some of these will need a paper prescription and others can be bought over the counter.  Ask your nurse if you " have questions.     Bring a paper prescription for each of these medications     zolpidem 5 MG tablet                Primary Care Provider Office Phone # Fax #    Jacob Nicole [phone] [address] [place] [address]        Equal Access to Services     FRANCESCA SOW : Hadii aad ku hadasho Soomaali, waaxda luqadaha, qaybta kaalmada adeegyada, waxay sadain hayaan adefloridalma ngokerrypawel hinton. So [facility] [phone].    ATENCIÓN: Si habla español, tiene a martinez disposición servicios gratuitos de asistencia lingüística. Himanshu al [phone].    We comply with applicable federal civil rights laws and [place] laws. We do not discriminate on the basis of race, color, national origin, age, disability, sex, sexual orientation, or gender identity.            Thank you!     Thank you for choosing [facility]  for your care. Our goal is always to provide you with excellent care. Hearing back from our patients is one way we can continue to improve our services. Please take a few minutes to complete the written survey that you may receive in the mail after your visit with us. Thank you!             Your Updated Medication List - Protect others around you: Learn how to safely use, store and throw away your medicines at www.disposemymeds.org.          This list is accurate as of 2/27/18  3:56 PM.  Always use your most recent med list.                   Brand Name Dispense Instructions for use Diagnosis    acetaminophen 325 MG tablet    TYLENOL    100 tablet    Take 2 tablets (650 mg) by mouth every 4 hours as needed for mild pain    S/P hysterectomy with oophorectomy       ibuprofen 800 MG tablet    ADVIL/MOTRIN    90 tablet    Take 1 tablet (800 mg) by mouth every 8 hours as needed for moderate pain    S/P hysterectomy       lidocaine 5 % Patch    LIDODERM    10 patch    Place 1 patch onto the skin every 24 hours    Chronic midline low back pain without sciatica        omeprazole 40 MG capsule    priLOSEC    90 capsule    Take 1 capsule (40 mg) by mouth daily    Gastroesophageal reflux disease without esophagitis       SYNTHROID 137 MCG tablet   Generic drug:  levothyroxine      Take 150 mcg by mouth daily        zolpidem 5 MG tablet    AMBIEN    1 tablet    Take tablet by mouth 15 minutes prior to sleep, for Sleep Study    Morbid obesity (H), Snoring, Psychophysiological insomnia, Gasping for breath, Witnessed episode of apnea

## 2018-03-02 ENCOUNTER — PRE VISIT (OUTPATIENT)
Dept: CARDIOLOGY | Facility: CLINIC | Age: 58
End: 2018-03-02

## 2018-03-14 ENCOUNTER — ALLIED HEALTH/NURSE VISIT (OUTPATIENT)
Dept: SURGERY | Facility: CLINIC | Age: 58
End: 2018-03-14
Payer: COMMERCIAL

## 2018-03-14 NOTE — PROGRESS NOTES
"New Bariatric Nutrition Consultation Note    Reason For Visit: Nutrition Assessment    Onofre Robertson is a 58 year old presenting today for new bariatric nutrition consult.   Pt is interested in laparoscopic sleeve gastrectomy with Dr. Burch expected surgery in August 2018.  Patient is accompanied by  [place].  This is pt's second of 6 required nutrition visits prior to surgery. Pt referred by Rebecca FONTANA(2/14/18).     She is interested in having weight loss surgery for the following reasons:  Unable to loose weight on her own, did not tolerate medications to hep with appetite suppression     Support System Reviewed With Patient 2/14/2018   Who do you have in your support network that can be available to help you for the first 2 weeks after surgery? [place] robertson   Who can you count on for support throughout your weight loss surgery journey? [place] robertson     Coordination Note:  (3/14/18) - Unable to review task list d/t lack of time. Pt arrived 23 minutes late to scheduled appointment.     ANTHROPOMETRICS:  Estimated body mass index is 44.06 kg/(m^2) as calculated from the following:    Height as of 2/27/18: 1.562 m (5' 1.5\").    Weight as of 2/27/18: 107.5 kg (237 lb).     Current weight: 238.6 (+ 1 lb over past month)     Required weight loss goal pre-op: 10 lbs from initial consult weight (goal weight 227 lbs or less before surgery)    SUPPLEMENT INFORMATION:  Isogenix supplements (primarily herbal supplement, also contains magnesium and niacin)     NUTRITION HISTORY:  Progress toward previous goals:  Relating To Eating:  Eat slowly (20-30 minutes per meal), chewing foods well (25 chews per bite/applesauce consistency) - Not meeting.   9\" Plate method (1/2 plate non-starchy vegetables/fruit, 1/4 plate lean protein, 1/4 plate whole grain starch - no more than 1/2 cup carb/meal) - Not meeting consistently.    Eat lean/ low-fat protein at all  Meals - Improving.    Eat 3 meals per day - may use " "protein shake for breakfast (less than 250 calories, at least 20 gm protein, less than 10 gm sugar) - Not meeting consistently. Provides the following diet recall (see below). Has been fasting 1 day/week (drinking only clear liquids only) per \"Isogenix diet\". Reports has been snacking more/eating more d/t family being in town over the past month.       Recent diet recall:     B: IsaLean shake (240 kcal, 24 g protein)    L: IsaLean shake    D: </= 600 kcal meal (e.g. Cheese + crackers -or- 2-egg omelette with 1/2 piece toast, english muffin -or- hamburger francine + cheese)   Snks: cheese, almonds, protein bar (2-3 snacks/day)      Relating to beverages:  Reduce caffeine/carbonation/calorie containing beverages - Meeting   Separate fluids from meals by 30 minutes before, during, and after eating - Not meeting.   Drink 48-64 ounces of fluid per day - Meeting, drink 4-5 bottles     Relating to dietary supplements:  Start a multivitamin containing iron daily - Not meeting. Using isogenix supplements (primarily magnesium and herbal supplement)      Relating to activity:  Increase activity level.  Exercise 2-3 days/week for 30 minutes - Not meeting.    NUTRITION DIAGNOSIS:  Obesity r/t long history of self-monitoring deficit and excessive energy intake aeb BMI >30.    INTERVENTION:  Intervention Provided/Education Provided:  Reviewed progress toward previous goals. Discussed modified liquid diet, protein shake criteria and importance of consistently following for optimum weight loss. Discouraged snacking in between meals and avoiding 1 day/week of fasting. Encouraged focusing on lean protein and non-starchy vegetables at meal times, reviewing portion sizes. Patient reported she dislikes a lot of protein, discussed protein sources patient finds acceptable. Encouraged physical activity. Provided encouragement and support. Provided pt with list of goals RD contact information.      Patient Understanding: good  Expected " Compliance: good    GOALS:  Relating To Eating:  Eat slowly (20-30 minutes per meal), chewing foods well (25 chews per bite/applesauce consistency)  Eat 3 meals per day, avoid snacking.     Follow the Modified Liquid Diet for weight loss:  Breakfast: Protein Shake/Bar  Lunch: Protein Shake/Bar  Supper: 3 oz lean protein + non-starchy vegetables  Snack: non-starchy vegetables (no calorie-containing dips/condiments)  Beverages: at least 48-64 oz water between meals daily    *Protein Shake Criteria: no more than 250 Calories, at least 20 grams of protein, and less than 10 grams of sugar     Relating to beverages:  Reduce caffeine/carbonation/calorie containing beverages  Drink 48-64 ounces of fluid per day    Relating to dietary supplements:  Start a multivitamin containing iron daily    Relating to activity:  Increase activity level.  Exercise 2-3 days/week for 15-30 minutes (walking on treadmill or outside)     Time spent with patient: 30 minutes.  Jen Reyes RD, LD

## 2018-03-14 NOTE — MR AVS SNAPSHOT
MRN:[id number]                      After Visit Summary   3/14/2018    Onofre Robertson    MRN: [id number]           Visit Information        Provider Department      3/14/2018 8:30 AM Leonie Butler RD [facility]        Your next 10 appointments already scheduled     Apr 16, 2018  8:15 AM CDT   New Visit with Waqar Kerns MD   [facility]    [address]  [place] [address]   [phone]            May 18, 2018  8:30 PM CDT   Psg Split W/Tcm with BED 3 SH SLEEP   [facility] ([facility])    [address]  [place] [place] [address]   [phone]            May 29, 2018  3:00 PM CDT   Return Sleep Patient with Neno Cross MD   [facility] ([facility])    [address]  [place] [address]   [phone]              Care Instructions      GOALS:  Relating To Eating:  Eat slowly (20-30 minutes per meal), chewing foods well (25 chews per bite/applesauce consistency)  Eat 3 meals per day     Follow the Modified Liquid Diet for weight loss:  Breakfast: Protein Shake/Bar  Lunch: Protein Shake/Bar  Supper: 3 oz lean protein + non-starchy vegetables  Snack: non-starchy vegetables (no calorie-containing dips/condiments)  Beverages: at least 48-64 oz water between meals daily    *Protein Shake Criteria: no more than 250 Calories, at least 20 grams of protein, and less than 10 grams of sugar     Relating to beverages:  Reduce caffeine/carbonation/calorie containing beverages  Drink 48-64 ounces of fluid per day    Relating to dietary supplements:  Start a multivitamin containing iron daily    Relating to activity:  Increase activity level.  Exercise 2-3 days/week for 15-30 minutes (walking treadmill)       Jen Reyes RD, LD     If you need to make or cancel an  appointment, please call [phone].           [employer] Information     [employer] gives you secure access to your electronic health record. If you see a primary care provider, you can also send messages to your care team and make appointments. If you have questions, please call your primary care clinic.  If you do not have a primary care provider, please call [phone] and they will assist you.      [employer] is an electronic gateway that provides easy, online access to your medical records. With [employer], you can request a clinic appointment, read your test results, renew a prescription or communicate with your care team.     To access your existing account, please contact your [facility] or call [phone] for assistance.        Care EveryWhere ID     This is your Care EveryWhere ID. This could be used by other organizations to access your [facility] medical records  [id number]        Equal Access to Services     FRANCESCA SOW : Pat Long, michael dangelo, teagan gomez. So [facility] [phone].    ATENCIÓN: Si habla español, tiene a martinez disposición servicios gratuitos de asistencia lingüística. Llame al [phone].    We comply with applicable federal civil rights laws and [place] laws. We do not discriminate on the basis of race, color, national origin, age, disability, sex, sexual orientation, or gender identity.

## 2018-03-14 NOTE — PATIENT INSTRUCTIONS
GOALS:  Relating To Eating:  Eat slowly (20-30 minutes per meal), chewing foods well (25 chews per bite/applesauce consistency)  Eat 3 meals per day     Follow the Modified Liquid Diet for weight loss:  Breakfast: Protein Shake/Bar  Lunch: Protein Shake/Bar  Supper: 3 oz lean protein + non-starchy vegetables  Snack: non-starchy vegetables (no calorie-containing dips/condiments)  Beverages: at least 48-64 oz water between meals daily    *Protein Shake Criteria: no more than 250 Calories, at least 20 grams of protein, and less than 10 grams of sugar     Relating to beverages:  Reduce caffeine/carbonation/calorie containing beverages  Drink 48-64 ounces of fluid per day    Relating to dietary supplements:  Start a multivitamin containing iron daily    Relating to activity:  Increase activity level.  Exercise 2-3 days/week for 15-30 minutes (walking treadmill)       Jen Reyes RD, LD     If you need to make or cancel an appointment, please call [phone].

## 2018-04-05 ENCOUNTER — TRANSFERRED RECORDS (OUTPATIENT)
Dept: HEALTH INFORMATION MANAGEMENT | Facility: CLINIC | Age: 58
End: 2018-04-05

## 2018-04-18 ENCOUNTER — ALLIED HEALTH/NURSE VISIT (OUTPATIENT)
Dept: SURGERY | Facility: CLINIC | Age: 58
End: 2018-04-18
Payer: COMMERCIAL

## 2018-04-18 VITALS — BODY MASS INDEX: 44.21 KG/M2 | WEIGHT: 237.8 LBS

## 2018-04-18 NOTE — PROGRESS NOTES
"Bariatric Nutrition Consultation Note    Reason For Visit: Nutrition Assessment    Onofre Robertson is a 58 year old presenting today for bariatric nutrition consult.   Pt is interested in laparoscopic sleeve gastrectomy with Dr. Burch expected surgery in August 2018.  Patient is accompanied by  Vidal.  This is pt's 3rd of 6 required nutrition visits prior to surgery. Pt referred by Rebecca FONTANA(2/14/18).     She is interested in having weight loss surgery for the following reasons:  Unable to loose weight on her own, did not tolerate medications to hep with appetite suppression     ANTHROPOMETRICS:  Estimated body mass index is 44.06 kg/(m^2) as calculated from the following:    Height as of 2/27/18: 1.562 m (5' 1.5\").    Weight as of 2/27/18: 107.5 kg (237 lb).     Current weight: 237.8 (-0.8 lb over past month)     Required weight loss goal pre-op: 10 lbs from initial consult weight (goal weight 227 lbs or less before surgery)    SUPPLEMENT INFORMATION:  Isogenix supplements (primarily herbal supplement, also contains magnesium and niacin)     NUTRITION HISTORY:  Recent food recall:  Breakfast- snack pack jerky and sunflower seed (P3 - 250 calories 13 gm protein)  Lunch- personal [employer]  Dinner- salad -> ranch dressing, sunflower seeds and croutons, 4 wheat crackers  Beverages- diet pepsi(1 can) and coffee, propel(1-2 bottles)    Progress toward previous goals:  Relating To Eating:  Eat slowly (20-30 minutes per meal), chewing foods well (25 chews per bite/applesauce consistency) - mindful, chewing to applesauce consistency less than 75% of the time  Eat 3 meals per day, avoid snacking. - no snacking    Follow the Modified Liquid Diet for weight loss - did not start    Relating to beverages:  Reduce caffeine/carbonation/calorie containing beverages - continues 1 diet pepsi per day  Drink 48-64 ounces of fluid per day - 1-2 bottles of propel per day    Relating to dietary supplements:  Start " a multivitamin containing iron daily - Taking a multivitamin but does not have iron    Relating to activity:  Increase activity level.  Exercise 2-3 days/week for 15-30 minutes (walking on treadmill or outside) - no time packing x 2 weeks recently 1778-4215 steps per day    NUTRITION DIAGNOSIS:  Obesity r/t long history of self-monitoring deficit and excessive energy intake aeb BMI >30.    INTERVENTION:  Intervention Provided/Education Provided:  Reviewed progress toward previous goals. Discussed modified liquid diet. Patient dislikes animal protein sources(dislikes chewy meat), discussed importance of protein post surgery.  Encouraged physical activity. Provided encouragement and support. Provided pt with list of goals RD contact information.      Patient Understanding: good  Expected Compliance: good    GOALS:  Relating To Eating:  Eat slowly (20-30 minutes per meal), chewing foods well (25 chews per bite/applesauce consistency)  Eat 3 meals per day, avoid snacking.     Follow the Modified Liquid Diet for weight loss:  Breakfast: Protein Shake/Bar  Lunch: Protein Shake/Bar  Supper: 3 oz lean protein + non-starchy vegetables  Snack: non-starchy vegetables (no calorie-containing dips/condiments)  Beverages: at least 48-64 oz water between meals daily    *Protein Shake Criteria: no more than 250 Calories, at least 20 grams of protein, and less than 10 grams of sugar     Relating to beverages:  Reduce caffeine/carbonation/calorie containing beverages  Drink 48-64 ounces of fluid per day    Relating to dietary supplements:  Start a multivitamin containing iron daily    Relating to activity:  Increase activity level.  Exercise 2-3 days/week for 15-30 minutes (walking on treadmill or outside)     Time spent with patient: 30 minutes.  Leonie Butler RD, LD

## 2018-05-15 ENCOUNTER — TRANSFERRED RECORDS (OUTPATIENT)
Dept: HEALTH INFORMATION MANAGEMENT | Facility: CLINIC | Age: 58
End: 2018-05-15

## 2018-05-31 ENCOUNTER — TELEPHONE (OUTPATIENT)
Dept: URGENT CARE | Facility: URGENT CARE | Age: 58
End: 2018-05-31

## 2018-05-31 ENCOUNTER — OFFICE VISIT (OUTPATIENT)
Dept: URGENT CARE | Facility: URGENT CARE | Age: 58
End: 2018-05-31
Payer: COMMERCIAL

## 2018-05-31 VITALS
OXYGEN SATURATION: 96 % | RESPIRATION RATE: 14 BRPM | SYSTOLIC BLOOD PRESSURE: 124 MMHG | HEART RATE: 76 BPM | DIASTOLIC BLOOD PRESSURE: 82 MMHG | TEMPERATURE: 98.5 F

## 2018-05-31 DIAGNOSIS — R07.0 THROAT PAIN: ICD-10-CM

## 2018-05-31 DIAGNOSIS — F17.200 TOBACCO USE DISORDER: ICD-10-CM

## 2018-05-31 DIAGNOSIS — R09.89 CHEST CONGESTION: Primary | ICD-10-CM

## 2018-05-31 DIAGNOSIS — R06.2 WHEEZING: ICD-10-CM

## 2018-05-31 DIAGNOSIS — R05.8 PRODUCTIVE COUGH: ICD-10-CM

## 2018-05-31 PROCEDURE — 99214 OFFICE O/P EST MOD 30 MIN: CPT | Performed by: PHYSICIAN ASSISTANT

## 2018-05-31 RX ORDER — ALBUTEROL SULFATE 90 UG/1
2 AEROSOL, METERED RESPIRATORY (INHALATION) EVERY 6 HOURS
Qty: 1 INHALER | Refills: 0 | Status: SHIPPED | OUTPATIENT
Start: 2018-05-31 | End: 2019-08-05

## 2018-05-31 RX ORDER — AZITHROMYCIN 250 MG/1
TABLET, FILM COATED ORAL
Qty: 6 TABLET | Refills: 0 | Status: SHIPPED | OUTPATIENT
Start: 2018-05-31 | End: 2018-09-17

## 2018-05-31 NOTE — MR AVS SNAPSHOT
After Visit Summary   5/31/2018    Onofre Robertson    MRN: [id number]           Patient Information     Date Of Birth          1960        Visit Information        Provider Department      5/31/2018 9:05 AM Hubert Rodríguez PA-C [facility]        Today's Diagnoses     Chest congestion    -  1    Productive cough        Tobacco use disorder        Throat pain        Wheezing           Follow-ups after your visit        Who to contact     If you have questions or need follow up information about today's clinic visit or your schedule please contact [facility] directly at [phone].  Normal or non-critical lab and imaging results will be communicated to you by [employer], letter or phone within 4 business days after the clinic has received the results. If you do not hear from us within 7 days, please contact the clinic through [employer] or phone. If you have a critical or abnormal lab result, we will notify you by phone as soon as possible.  Submit refill requests through [employer] or call your pharmacy and they will forward the refill request to us. Please allow 3 business days for your refill to be completed.          Additional Information About Your Visit        MyChart Information     [employer] gives you secure access to your electronic health record. If you see a primary care provider, you can also send messages to your care team and make appointments. If you have questions, please call your primary care clinic.  If you do not have a primary care provider, please call [phone] and they will assist you.        Care EveryWhere ID     This is your Care EveryWhere ID. This could be used by other organizations to access your [facility] medical records  [id number]        Your Vitals Were     Pulse Temperature Respirations Last Period Pulse Oximetry       76 98.5  F (36.9  C) (Oral) 14 02/20/2015 96%        Blood Pressure from Last 3 Encounters:    05/31/18 124/82   02/27/18 113/70   02/14/18 133/76    Weight from Last 3 Encounters:   04/18/18 237 lb 12.8 oz (107.9 kg)   02/27/18 237 lb (107.5 kg)   02/14/18 237 lb 9.6 oz (107.8 kg)              Today, you had the following     No orders found for display         Today's Medication Changes          These changes are accurate as of 5/31/18  9:38 AM.  If you have any questions, ask your nurse or doctor.               Start taking these medicines.        Dose/Directions    albuterol 108 (90 Base) MCG/ACT Inhaler   Commonly known as:  PROVENTIL HFA   Used for:  Wheezing   Started by:  Hubert Rodríguez PA-C        Dose:  2 puff   Inhale 2 puffs into the lungs every 6 hours   Quantity:  1 Inhaler   Refills:  0       azithromycin 250 MG tablet   Commonly known as:  ZITHROMAX   Used for:  Chest congestion, Productive cough   Started by:  Hubert Rodríguez PA-C        2 tabs po qd day 1, then 1 tab po qd days 2-5   Quantity:  6 tablet   Refills:  0       magic mouthwash suspension   Commonly known as:  ENTER INGREDIENTS IN COMMENTS   Used for:  Throat pain   Started by:  Hubert Rodríguez PA-C        Dose:  5-10 mL   Swish and spit 5-10 mLs in mouth every 6 hours as needed compound 30 ml Benadryl (12.5 mg/5 ml), 60 ml Maalox and 30 ml Viscous Lidocaine   Quantity:  120 mL   Refills:  0            Where to get your medicines      These medications were sent to [facility] [id number] [place] [address] [address] [facility] [address]  [address] [facility] [address], [facility] [address]     Phone:  [phone]     albuterol 108 (90 Base) MCG/ACT Inhaler    azithromycin 250 MG tablet         Some of these will need a paper prescription and others can be bought over the counter.  Ask your nurse if you have questions.     Bring a paper prescription for each of these medications     magic mouthwash suspension                Primary Care Provider Office Phone # Fax #    Jacob Nicole [phone]  [phone]       [address] [facility] [address]  [facility] [address]        Equal Access to Services     FRANCESCA SOW : Hadii aad ku hadmark anthonyo Soteroali, wayeseniada luqadaha, qaybta kaalmada susanne, teagan sadamitzi marmolejo laludivinajuancarlos hinton. So [facility] [phone].    ATENCIÓN: Si habla español, tiene a martinez disposición servicios gratuitos de asistencia lingüística. Llame al [phone].    We comply with applicable federal civil rights laws and [place] laws. We do not discriminate on the basis of race, color, national origin, age, disability, sex, sexual orientation, or gender identity.            Thank you!     Thank you for choosing [facility]  for your care. Our goal is always to provide you with excellent care. Hearing back from our patients is one way we can continue to improve our services. Please take a few minutes to complete the written survey that you may receive in the mail after your visit with us. Thank you!             Your Updated Medication List - Protect others around you: Learn how to safely use, store and throw away your medicines at www.disposemymeds.org.          This list is accurate as of 5/31/18  9:38 AM.  Always use your most recent med list.                   Brand Name Dispense Instructions for use Diagnosis    acetaminophen 325 MG tablet    TYLENOL    100 tablet    Take 2 tablets (650 mg) by mouth every 4 hours as needed for mild pain    S/P hysterectomy with oophorectomy       albuterol 108 (90 Base) MCG/ACT Inhaler    PROVENTIL HFA    1 Inhaler    Inhale 2 puffs into the lungs every 6 hours    Wheezing       azithromycin 250 MG tablet    ZITHROMAX    6 tablet    2 tabs po qd day 1, then 1 tab po qd days 2-5    Chest congestion, Productive cough       ibuprofen 800 MG tablet    ADVIL/MOTRIN    90 tablet    Take 1 tablet (800 mg) by mouth every 8 hours as needed for moderate pain    S/P hysterectomy       lidocaine 5 % Patch    LIDODERM    10 patch    Place 1 patch onto  the skin every 24 hours    Chronic midline low back pain without sciatica       magic mouthwash suspension    ENTER INGREDIENTS IN COMMENTS    120 mL    Swish and spit 5-10 mLs in mouth every 6 hours as needed compound 30 ml Benadryl (12.5 mg/5 ml), 60 ml Maalox and 30 ml Viscous Lidocaine    Throat pain       omeprazole 40 MG capsule    priLOSEC    90 capsule    Take 1 capsule (40 mg) by mouth daily    Gastroesophageal reflux disease without esophagitis       SYNTHROID 137 MCG tablet   Generic drug:  levothyroxine      Take 150 mcg by mouth daily        zolpidem 5 MG tablet    AMBIEN    1 tablet    Take tablet by mouth 15 minutes prior to sleep, for Sleep Study    Morbid obesity (H), Snoring, Psychophysiological insomnia, Gasping for breath, Witnessed episode of apnea

## 2018-05-31 NOTE — PROGRESS NOTES
"SUBJECTIVE:   Onofre Robertson is a 58 year old female presenting with a chief complaint of chest congestion, productive cough, wheezing, throat pain.  Onset of symptoms was 5 day(s) ago.  Course of illness is worsening.    Severity moderate  Current and Associated symptoms: throat pain, chest congestion  Treatment measures tried include OTC Cough med.  Predisposing factors include recent illness.    Past Medical History:   Diagnosis Date     Anaphylaxis     reaction to penicillin     Arthritis      Esophageal reflux      H pylori ulcer      High cholesterol      History of radiation therapy      Lyme disease      Malignant neoplasm of thyroid gland (H) 1980    Thyroid cancer     PONV (postoperative nausea and vomiting)     n/v postop     Unspecified hypothyroidism         Allergies   Allergen Reactions     Contrast Dye      respiratory     Morphine Anaphylaxis     Penicillins Anaphylaxis     \"ANAPHYLACTIC\" ER     Topamax [Topiramate] Other (See Comments)     Stomach burning and rapid heart rate       Celexa [Citalopram] Itching, Swelling and Rash     Tongue and face numbness, couldn't taste right,  tongue swelling and itchy rash     Prozac [Fluoxetine] Itching, Swelling and Rash     Tongue and face numbness, couldn't taste right, tongue swelling and itchy rash         Social History   Substance Use Topics     Smoking status: Former Smoker     Packs/day: 0.50     Years: 45.00     Types: Hookah     Quit date: 1/15/2011     Smokeless tobacco: Current User      Comment: e-cigarettes      Alcohol use 0.0 oz/week      Comment: 6 PER WEEK       ROS:  CONSTITUTIONAL:NEGATIVE for fever, chills, change in weight  INTEGUMENTARY/SKIN: NEGATIVE for worrisome rashes, moles or lesions  ENT/MOUTH: POSITIVE for throat pain  RESP:POSITIVE for cough-productive and wheezing  CV: NEGATIVE for chest pain, palpitations or peripheral edema  GI: NEGATIVE for nausea, abdominal pain, heartburn, or change in bowel habits  : negative for " and dysuria  MUSCULOSKELETAL: NEGATIVE for significant arthralgias or myalgia  NEURO: NEGATIVE for weakness, dizziness or paresthesias    OBJECTIVE  :/82 (BP Location: Right arm, Patient Position: Chair, Cuff Size: Adult Regular)  Pulse 76  Temp 98.5  F (36.9  C) (Oral)  Resp 14  LMP 02/20/2015  SpO2 96%  GENERAL APPEARANCE: healthy, alert and no distress  EYES: EOMI,  PERRL, conjunctiva clear  HENT: ear canals and TM's normal.  Nose and mouth without ulcers, erythema or lesions  NECK: supple, nontender, no lymphadenopathy  RESP: expiratory wheezes   CV: regular rates and rhythm, normal S1 S2, no murmur noted  ABDOMEN:  soft, nontender, no HSM or masses and bowel sounds normal  NEURO: Normal strength and tone, sensory exam grossly normal,  normal speech and mentation  SKIN: no suspicious lesions or rashes    ASSESSMENT/PLAN:      ICD-10-CM    1. Chest congestion R09.89 azithromycin (ZITHROMAX) 250 MG tablet   2. Productive cough R05 azithromycin (ZITHROMAX) 250 MG tablet   3. Tobacco use disorder F17.200    4. Throat pain R07.0 magic mouthwash (ENTER INGREDIENTS IN COMMENTS) suspension   5. Wheezing R06.2 albuterol (PROVENTIL HFA) 108 (90 Base) MCG/ACT Inhaler       Orders Placed This Encounter     azithromycin (ZITHROMAX) 250 MG tablet     albuterol (PROVENTIL HFA) 108 (90 Base) MCG/ACT Inhaler     magic mouthwash (ENTER INGREDIENTS IN COMMENTS) suspension       Follow up as needed

## 2018-07-27 ENCOUNTER — TELEPHONE (OUTPATIENT)
Dept: SURGERY | Facility: CLINIC | Age: 58
End: 2018-07-27

## 2018-07-27 NOTE — TELEPHONE ENCOUNTER
"Chart reviewed. No SHOWS: 3/8 & 4/16 cards; CANCELS: 3/16 cards, 4/17 RD, 5/18 PSG, 5/23 RD, 5/29 sleep return visit.  Attempted to call patient to see if she wanted to restart process in preparing for weight loss surgery.  Phone call disconnected at other end.  Will put patient on \"no longer active\" list.  "

## 2018-09-05 ENCOUNTER — TRANSFERRED RECORDS (OUTPATIENT)
Dept: HEALTH INFORMATION MANAGEMENT | Facility: CLINIC | Age: 58
End: 2018-09-05

## 2018-09-17 ENCOUNTER — APPOINTMENT (OUTPATIENT)
Dept: CT IMAGING | Facility: CLINIC | Age: 58
End: 2018-09-17
Attending: EMERGENCY MEDICINE
Payer: COMMERCIAL

## 2018-09-17 ENCOUNTER — HOSPITAL ENCOUNTER (EMERGENCY)
Facility: CLINIC | Age: 58
Discharge: HOME | End: 2018-09-17
Attending: EMERGENCY MEDICINE | Admitting: EMERGENCY MEDICINE
Payer: COMMERCIAL

## 2018-09-17 VITALS
TEMPERATURE: 97.8 F | DIASTOLIC BLOOD PRESSURE: 54 MMHG | BODY MASS INDEX: 44.68 KG/M2 | OXYGEN SATURATION: 96 % | SYSTOLIC BLOOD PRESSURE: 120 MMHG | WEIGHT: 240.3 LBS | RESPIRATION RATE: 16 BRPM

## 2018-09-17 DIAGNOSIS — R11.10 VOMITING AND DIARRHEA: ICD-10-CM

## 2018-09-17 DIAGNOSIS — R19.7 VOMITING AND DIARRHEA: ICD-10-CM

## 2018-09-17 DIAGNOSIS — R10.84 ABDOMINAL PAIN, GENERALIZED: ICD-10-CM

## 2018-09-17 LAB
ALBUMIN SERPL-MCNC: 3.5 G/DL (ref 3.4–5)
ALP SERPL-CCNC: 85 U/L (ref 40–150)
ALT SERPL W P-5'-P-CCNC: 28 U/L (ref 0–50)
ANION GAP SERPL CALCULATED.3IONS-SCNC: 11 MMOL/L (ref 3–14)
AST SERPL W P-5'-P-CCNC: 18 U/L (ref 0–45)
BASOPHILS # BLD AUTO: 0 10E9/L (ref 0–0.2)
BASOPHILS NFR BLD AUTO: 0.3 %
BILIRUB SERPL-MCNC: 0.4 MG/DL (ref 0.2–1.3)
BUN SERPL-MCNC: 18 MG/DL (ref 7–30)
CALCIUM SERPL-MCNC: 8.8 MG/DL (ref 8.5–10.1)
CHLORIDE SERPL-SCNC: 107 MMOL/L (ref 94–109)
CO2 SERPL-SCNC: 23 MMOL/L (ref 20–32)
CREAT SERPL-MCNC: 0.83 MG/DL (ref 0.52–1.04)
DIFFERENTIAL METHOD BLD: ABNORMAL
EOSINOPHIL # BLD AUTO: 0.2 10E9/L (ref 0–0.7)
EOSINOPHIL NFR BLD AUTO: 1.5 %
ERYTHROCYTE [DISTWIDTH] IN BLOOD BY AUTOMATED COUNT: 12.2 % (ref 10–15)
GFR SERPL CREATININE-BSD FRML MDRD: 70 ML/MIN/1.7M2
GLUCOSE SERPL-MCNC: 114 MG/DL (ref 70–99)
HCT VFR BLD AUTO: 42.4 % (ref 35–47)
HGB BLD-MCNC: 13.9 G/DL (ref 11.7–15.7)
IMM GRANULOCYTES # BLD: 0.1 10E9/L (ref 0–0.4)
IMM GRANULOCYTES NFR BLD: 0.4 %
LACTATE BLD-SCNC: 1.8 MMOL/L (ref 0.7–2)
LIPASE SERPL-CCNC: 375 U/L (ref 73–393)
LYMPHOCYTES # BLD AUTO: 2.9 10E9/L (ref 0.8–5.3)
LYMPHOCYTES NFR BLD AUTO: 25.2 %
MCH RBC QN AUTO: 31.6 PG (ref 26.5–33)
MCHC RBC AUTO-ENTMCNC: 32.8 G/DL (ref 31.5–36.5)
MCV RBC AUTO: 96 FL (ref 78–100)
MONOCYTES # BLD AUTO: 0.5 10E9/L (ref 0–1.3)
MONOCYTES NFR BLD AUTO: 4.3 %
NEUTROPHILS # BLD AUTO: 8 10E9/L (ref 1.6–8.3)
NEUTROPHILS NFR BLD AUTO: 68.3 %
NRBC # BLD AUTO: 0 10*3/UL
NRBC BLD AUTO-RTO: 0 /100
PLATELET # BLD AUTO: 239 10E9/L (ref 150–450)
POTASSIUM SERPL-SCNC: 4 MMOL/L (ref 3.4–5.3)
PROT SERPL-MCNC: 7 G/DL (ref 6.8–8.8)
RBC # BLD AUTO: 4.4 10E12/L (ref 3.8–5.2)
SODIUM SERPL-SCNC: 141 MMOL/L (ref 133–144)
WBC # BLD AUTO: 11.7 10E9/L (ref 4–11)

## 2018-09-17 PROCEDURE — 96375 TX/PRO/DX INJ NEW DRUG ADDON: CPT

## 2018-09-17 PROCEDURE — 96374 THER/PROPH/DIAG INJ IV PUSH: CPT

## 2018-09-17 PROCEDURE — 80053 COMPREHEN METABOLIC PANEL: CPT | Performed by: EMERGENCY MEDICINE

## 2018-09-17 PROCEDURE — 99285 EMERGENCY DEPT VISIT HI MDM: CPT | Mod: 25

## 2018-09-17 PROCEDURE — 25000128 H RX IP 250 OP 636: Performed by: EMERGENCY MEDICINE

## 2018-09-17 PROCEDURE — 74176 CT ABD & PELVIS W/O CONTRAST: CPT

## 2018-09-17 PROCEDURE — 83605 ASSAY OF LACTIC ACID: CPT | Performed by: EMERGENCY MEDICINE

## 2018-09-17 PROCEDURE — 85025 COMPLETE CBC W/AUTO DIFF WBC: CPT | Performed by: EMERGENCY MEDICINE

## 2018-09-17 PROCEDURE — 83690 ASSAY OF LIPASE: CPT | Performed by: EMERGENCY MEDICINE

## 2018-09-17 RX ORDER — ONDANSETRON 2 MG/ML
4 INJECTION INTRAMUSCULAR; INTRAVENOUS
Status: COMPLETED | OUTPATIENT
Start: 2018-09-17 | End: 2018-09-17

## 2018-09-17 RX ORDER — KETOROLAC TROMETHAMINE 15 MG/ML
15 INJECTION, SOLUTION INTRAMUSCULAR; INTRAVENOUS ONCE
Status: COMPLETED | OUTPATIENT
Start: 2018-09-17 | End: 2018-09-17

## 2018-09-17 RX ORDER — ONDANSETRON 4 MG/1
4 TABLET, ORALLY DISINTEGRATING ORAL EVERY 6 HOURS PRN
Qty: 12 TABLET | Refills: 0 | Status: SHIPPED | OUTPATIENT
Start: 2018-09-17 | End: 2018-09-20

## 2018-09-17 RX ADMIN — ONDANSETRON 4 MG: 2 INJECTION INTRAMUSCULAR; INTRAVENOUS at 04:53

## 2018-09-17 RX ADMIN — KETOROLAC TROMETHAMINE 15 MG: 15 INJECTION, SOLUTION INTRAMUSCULAR; INTRAVENOUS at 04:53

## 2018-09-17 RX ADMIN — SODIUM CHLORIDE 1000 ML: 9 INJECTION, SOLUTION INTRAVENOUS at 04:54

## 2018-09-17 ASSESSMENT — ENCOUNTER SYMPTOMS
VOMITING: 1
DIARRHEA: 1
NAUSEA: 1
CHILLS: 1
ABDOMINAL PAIN: 1
FEVER: 0

## 2018-09-17 NOTE — DISCHARGE INSTRUCTIONS
Discharge Instructions  Abdominal Pain    Abdominal pain (belly pain) can be caused by many things. Your evaluation today does not show the exact cause for your pain. Your provider today has decided that it is unlikely your pain is due to a life threatening problem, or a problem requiring surgery or hospital admission. Sometimes those problems cannot be found right away, so it is very important that you follow up as directed.  Sometimes only the changes which occur over time allow the cause of your pain to be found.    Generally, every Emergency Department visit should have a follow-up clinic visit with either a primary or a specialty clinic/provider. Please follow-up as instructed by your emergency provider today. With abdominal pain, we often recommend very close follow-up, such as the following day.    ADULTS:  Return to the Emergency Department right away if:      You get an oral temperature above 102oF or as directed by your provider.    You have blood in your stools. This may be bright red or appear as black, tarry stools.      You keep vomiting (throwing up) or cannot drink liquids.    You see blood when you vomit.     You cannot have a bowel movement or you cannot pass gas.    Your stomach gets bloated or bigger.    Your skin or the whites of your eyes look yellow.    You faint.    You have bloody, frequent or painful urination (peeing).    You have new symptoms or anything that worries you.      MORE INFORMATION:    Appendicitis:  A possible cause of abdominal pain in any person who still has their appendix is acute appendicitis. Appendicitis is often hard to diagnose.  Testing does not always rule out early appendicitis or other causes of abdominal pain. Close follow-up with your provider and re-evaluations may be needed to figure out the reason for your abdominal pain.    Follow-up:  It is very important that you make an appointment with your clinic and go to the appointment.  If you do not follow-up with  "your primary provider, it may result in missing an important development which could result in permanent injury or disability and/or lasting pain.  If there is any problem keeping your appointment, call your provider or return to the Emergency Department.    Medications:  Take your medications as directed by your provider today.  Before using over-the-counter medications, ask your provider and make sure to take the medications as directed.  If you have any questions about medications, ask your provider.    Diet:  Resume your normal diet as much as possible, but do not eat fried, fatty or spicy foods while you have pain.  Do not drink alcohol or have caffeine.  Do not smoke tobacco.    Probiotics: If you have been given an antibiotic, you may want to also take a probiotic pill or eat yogurt with live cultures. Probiotics have \"good bacteria\" to help your intestines stay healthy. Studies have shown that probiotics help prevent diarrhea (loose stools) and other intestine problems (including C. diff infection) when you take antibiotics. You can buy these without a prescription in the pharmacy section of the store.     If you were given a prescription for medicine here today, be sure to read all of the information (including the package insert) that comes with your prescription.  This will include important information about the medicine, its side effects, and any warnings that you need to know about.  The pharmacist who fills the prescription can provide more information and answer questions you may have about the medicine.  If you have questions or concerns that the pharmacist cannot address, please call or return to the Emergency Department.       Remember that you can always come back to the Emergency Department if you are not able to see your regular provider in the amount of time listed above, if you get any new symptoms, or if there is anything that worries you.    "

## 2018-09-17 NOTE — ED TRIAGE NOTES
Pt with LQ abd pain that started approx 1hr PTA, woke pt up from sleep.  Pt has had similar bouts of this type of pain in the past.  Associated diarrhea, associated nausea, associated burping.

## 2018-09-17 NOTE — ED AVS SNAPSHOT
[facility] Emergency Department    [address] [place] [address]    Phone:  [phone]    Fax:  [phone]                                       Onofre Robertson   MRN: [id number]    Department:  [facility] Emergency Department   Date of Visit:  9/17/2018           Patient Information     Date Of Birth          1960        Your diagnoses for this visit were:     Abdominal pain, generalized     Vomiting and diarrhea        You were seen by Anurag Zhu MD.      Follow-up Information     Follow up with Gastroenterology as scheduled for EGD on Thursday.        Discharge Instructions       Discharge Instructions  Abdominal Pain    Abdominal pain (belly pain) can be caused by many things. Your evaluation today does not show the exact cause for your pain. Your provider today has decided that it is unlikely your pain is due to a life threatening problem, or a problem requiring surgery or hospital admission. Sometimes those problems cannot be found right away, so it is very important that you follow up as directed.  Sometimes only the changes which occur over time allow the cause of your pain to be found.    Generally, every Emergency Department visit should have a follow-up clinic visit with either a primary or a specialty clinic/provider. Please follow-up as instructed by your emergency provider today. With abdominal pain, we often recommend very close follow-up, such as the following day.    ADULTS:  Return to the Emergency Department right away if:      You get an oral temperature above 102oF or as directed by your provider.    You have blood in your stools. This may be bright red or appear as black, tarry stools.      You keep vomiting (throwing up) or cannot drink liquids.    You see blood when you vomit.     You cannot have a bowel movement or you cannot pass gas.    Your stomach gets bloated or bigger.    Your skin or the whites of your eyes look  "yellow.    You faint.    You have bloody, frequent or painful urination (peeing).    You have new symptoms or anything that worries you.      MORE INFORMATION:    Appendicitis:  A possible cause of abdominal pain in any person who still has their appendix is acute appendicitis. Appendicitis is often hard to diagnose.  Testing does not always rule out early appendicitis or other causes of abdominal pain. Close follow-up with your provider and re-evaluations may be needed to figure out the reason for your abdominal pain.    Follow-up:  It is very important that you make an appointment with your clinic and go to the appointment.  If you do not follow-up with your primary provider, it may result in missing an important development which could result in permanent injury or disability and/or lasting pain.  If there is any problem keeping your appointment, call your provider or return to the Emergency Department.    Medications:  Take your medications as directed by your provider today.  Before using over-the-counter medications, ask your provider and make sure to take the medications as directed.  If you have any questions about medications, ask your provider.    Diet:  Resume your normal diet as much as possible, but do not eat fried, fatty or spicy foods while you have pain.  Do not drink alcohol or have caffeine.  Do not smoke tobacco.    Probiotics: If you have been given an antibiotic, you may want to also take a probiotic pill or eat yogurt with live cultures. Probiotics have \"good bacteria\" to help your intestines stay healthy. Studies have shown that probiotics help prevent diarrhea (loose stools) and other intestine problems (including C. diff infection) when you take antibiotics. You can buy these without a prescription in the pharmacy section of the store.     If you were given a prescription for medicine here today, be sure to read all of the information (including the package insert) that comes with your " prescription.  This will include important information about the medicine, its side effects, and any warnings that you need to know about.  The pharmacist who fills the prescription can provide more information and answer questions you may have about the medicine.  If you have questions or concerns that the pharmacist cannot address, please call or return to the Emergency Department.       Remember that you can always come back to the Emergency Department if you are not able to see your regular provider in the amount of time listed above, if you get any new symptoms, or if there is anything that worries you.      24 Hour Appointment Hotline       To make an appointment at any [facility], call [phone] ([phone]). If you don't have a family doctor or clinic, we will help you find one. [facility] are conveniently located to serve the needs of you and your family.             Review of your medicines      START taking        Dose / Directions Last dose taken    ondansetron 4 MG ODT tab   Commonly known as:  ZOFRAN ODT   Dose:  4 mg   Quantity:  12 tablet        Take 1 tablet (4 mg) by mouth every 6 hours as needed for nausea   Refills:  0          Our records show that you are taking the medicines listed below. If these are incorrect, please call your family doctor or clinic.        Dose / Directions Last dose taken    albuterol 108 (90 Base) MCG/ACT inhaler   Commonly known as:  PROVENTIL HFA   Dose:  2 puff   Quantity:  1 Inhaler        Inhale 2 puffs into the lungs every 6 hours   Refills:  0        lidocaine 5 % Patch   Commonly known as:  LIDODERM   Dose:  1 patch   Quantity:  10 patch        Place 1 patch onto the skin every 24 hours   Refills:  3        omeprazole 40 MG capsule   Commonly known as:  priLOSEC   Dose:  40 mg   Quantity:  90 capsule        Take 1 capsule (40 mg) by mouth daily   Refills:  3        SYNTHROID 137 MCG tablet   Dose:  150 mcg   Generic drug:  levothyroxine         Take 150 mcg by mouth daily   Refills:  0        zolpidem 5 MG tablet   Commonly known as:  AMBIEN   Quantity:  1 tablet        Take tablet by mouth 15 minutes prior to sleep, for Sleep Study   Refills:  0                Prescriptions were sent or printed at these locations (1 Prescription)                   Other Prescriptions                Printed at Department/Unit printer (1 of 1)         ondansetron (ZOFRAN ODT) 4 MG ODT tab                Procedures and tests performed during your visit     CBC with platelets differential    CT Abdomen Pelvis without Contrast (stone protocol)    Comprehensive metabolic panel    Lactic acid whole blood    Lipase    Peripheral IV: Standard      Orders Needing Specimen Collection     None      Pending Results     No orders found from 9/15/2018 to 9/18/2018.            Pending Culture Results     No orders found from 9/15/2018 to 9/18/2018.            Pending Results Instructions     If you had any lab results that were not finalized at the time of your Discharge, you can call the ED Lab Result RN at [phone]. You will be contacted by this team for any positive Lab results or changes in treatment. The nurses are available 7 days a week from 10A to 6:30P.  You can leave a message 24 hours per day and they will return your call.        Test Results From Your Hospital Stay        9/17/2018  5:00 AM      Component Results     Component Value Ref Range & Units Status    WBC 11.7 (H) 4.0 - 11.0 10e9/L Final    RBC Count 4.40 3.8 - 5.2 10e12/L Final    Hemoglobin 13.9 11.7 - 15.7 g/dL Final    Hematocrit 42.4 35.0 - 47.0 % Final    MCV 96 78 - 100 fl Final    MCH 31.6 26.5 - 33.0 pg Final    MCHC 32.8 31.5 - 36.5 g/dL Final    RDW 12.2 10.0 - 15.0 % Final    Platelet Count 239 150 - 450 10e9/L Final    Diff Method Automated Method  Final    % Neutrophils 68.3 % Final    % Lymphocytes 25.2 % Final    % Monocytes 4.3 % Final    % Eosinophils 1.5 % Final    % Basophils 0.3 % Final    %  Immature Granulocytes 0.4 % Final    Nucleated RBCs 0 0 /100 Final    Absolute Neutrophil 8.0 1.6 - 8.3 10e9/L Final    Absolute Lymphocytes 2.9 0.8 - 5.3 10e9/L Final    Absolute Monocytes 0.5 0.0 - 1.3 10e9/L Final    Absolute Eosinophils 0.2 0.0 - 0.7 10e9/L Final    Absolute Basophils 0.0 0.0 - 0.2 10e9/L Final    Abs Immature Granulocytes 0.1 0 - 0.4 10e9/L Final    Absolute Nucleated RBC 0.0  Final         9/17/2018  5:19 AM      Component Results     Component Value Ref Range & Units Status    Sodium 141 133 - 144 mmol/L Final    Potassium 4.0 3.4 - 5.3 mmol/L Final    Chloride 107 94 - 109 mmol/L Final    Carbon Dioxide 23 20 - 32 mmol/L Final    Anion Gap 11 3 - 14 mmol/L Final    Glucose 114 (H) 70 - 99 mg/dL Final    Urea Nitrogen 18 7 - 30 mg/dL Final    Creatinine 0.83 0.52 - 1.04 mg/dL Final    GFR Estimate 70 >60 mL/min/1.7m2 Final    Non  GFR Calc    GFR Estimate If Black 85 >60 mL/min/1.7m2 Final    African American GFR Calc    Calcium 8.8 8.5 - 10.1 mg/dL Final    Bilirubin Total 0.4 0.2 - 1.3 mg/dL Final    Albumin 3.5 3.4 - 5.0 g/dL Final    Protein Total 7.0 6.8 - 8.8 g/dL Final    Alkaline Phosphatase 85 40 - 150 U/L Final    ALT 28 0 - 50 U/L Final    AST 18 0 - 45 U/L Final         9/17/2018  5:00 AM      Component Results     Component Value Ref Range & Units Status    Lactic Acid 1.8 0.7 - 2.0 mmol/L Final         9/17/2018  5:19 AM      Component Results     Component Value Ref Range & Units Status    Lipase 375 73 - 393 U/L Final         9/17/2018  5:20 AM      Narrative     CT ABDOMEN PELVIS W/O CONTRAST 9/17/2018 5:12 AM    HISTORY: Abdominal pain.    COMPARISON: 2/19/2015    TECHNIQUE: Noncontrast abdomen and pelvis CT.  Radiation dose for this  scan was reduced using automated exposure control, adjustment of the  mA and/or kV according to patient size, or iterative reconstruction  technique.    FINDINGS: Lung bases are clear.    Stable cyst in the left lobe of the  liver. Stable peripheral areas of  low attenuation in the right lobe of the liver. Gallbladder, spleen,  pancreas, adrenal glands and kidneys appear normal.    Normal caliber bowel. Appendix is visualized and normal. No free air.  No free or loculated fluid collection.    Uterus is absent. Urinary bladder is collapsed. No free fluid in the  pelvis.        Impression     IMPRESSION: No acute abnormality.    JOSE STEVENS MD                Clinical Quality Measure: Blood Pressure Screening     Your blood pressure was checked while you were in the emergency department today. The last reading we obtained was  BP: (!) 151/106 . Please read the guidelines below about what these numbers mean and what you should do about them.  If your systolic blood pressure (the top number) is less than 120 and your diastolic blood pressure (the bottom number) is less than 80, then your blood pressure is normal. There is nothing more that you need to do about it.  If your systolic blood pressure (the top number) is 120-139 or your diastolic blood pressure (the bottom number) is 80-89, your blood pressure may be higher than it should be. You should have your blood pressure rechecked within a year by a primary care provider.  If your systolic blood pressure (the top number) is 140 or greater or your diastolic blood pressure (the bottom number) is 90 or greater, you may have high blood pressure. High blood pressure is treatable, but if left untreated over time it can put you at risk for heart attack, stroke, or kidney failure. You should have your blood pressure rechecked by a primary care provider within the next 4 weeks.  If your provider in the emergency department today gave you specific instructions to follow-up with your doctor or provider even sooner than that, you should follow that instruction and not wait for up to 4 weeks for your follow-up visit.        Thank you for choosing [facility]       Thank you for choosing [facility] for  your care. Our goal is always to provide you with excellent care. Hearing back from our patients is one way we can continue to improve our services. Please take a few minutes to complete the written survey that you may receive in the mail after you visit with us. Thank you!        [facility] Information     [facility] gives you secure access to your electronic health record. If you see a primary care provider, you can also send messages to your care team and make appointments. If you have questions, please call your primary care clinic.  If you do not have a primary care provider, please call [phone] and they will assist you.        Care EveryWhere ID     This is your Care EveryWhere ID. This could be used by other organizations to access your [facility] medical records  [id number]        Equal Access to Services     FRANCESCA SOW : Pat Long, michael dangelo, christina skinner, teagan hinton. So [facility] [phone].    ATENCIÓN: Si habla español, tiene a martinez disposición servicios gratuitos de asistencia lingüística. Llame al [phone].    We comply with applicable federal civil rights laws and [place] laws. We do not discriminate on the basis of race, color, national origin, age, disability, sex, sexual orientation, or gender identity.            After Visit Summary       This is your record. Keep this with you and show to your community pharmacist(s) and doctor(s) at your next visit.

## 2018-09-17 NOTE — ED PROVIDER NOTES
History     Chief Complaint:  Abdominal Pain    HPI   Onofre Robertson is a 58 year old female who presents with abdominal pain. The patient states that she developed upper abdominal pain approximately one hour prior to arrival. Her pain has been intermittent and comes in waves. She states that she has had similar pain in the past but has never been evaluated for it while it's happening.  She is followed by GI. The patient reports associated diarrhea, nausea, vomiting, and belching. She has had chills but denies any fevers.     Allergies:  Contrast Dye   Morphine   Penicillins   Topamax  Celexa   Prozac     Medications:    Albuterol   Levothyroxine   Lidocaine   Prilosec   Ambien      Past Medical History:    Diagnosis     Arthritis     Esophageal reflux     H pylori ulcer     Hypercholesterolemia     Hypothyroidism     Lyme disease     Thyroid cancer         Past Surgical History:    Procedure      SECTION     COLONOSCOPY     CYSTOSCOPY        DAVINCI HYSTERECTOMY TOTAL, BILATERAL SALPINGO-OOPHORECTOMY, COMBINED        HYSTERECTOMY     LEFT BREAST LUMPECTOMY     LIGATE FALLOPIAN TUBE NOS     ORTHOPEDIC SURGERY     RHINOPLASTY     THYROIDECTOMY     TONSILLECTOMY         Family History:    Heart Disease-Father  Hyperlipidemia-Father  Breast Cancer-Mother  Thyroid Disease-Mother  Epilepsy-Sister    Social History:  Marital Status:   Presents to the ED with   Tobacco Use: Former Smoker  Alcohol Use: Yes  PCP: Jacob Nicole MD      Review of Systems   Constitutional: Positive for chills. Negative for fever.   Gastrointestinal: Positive for abdominal pain, diarrhea, nausea and vomiting.   All other systems reviewed and are negative.    Physical Exam     Patient Vitals for the past 24 hrs:   BP Temp Temp src Heart Rate Resp SpO2 Weight   18 0543 - - - - 16 - -   18 0541 - - - - - 96 % -   18 0540 120/54 - - - - - -   18 0438 (!) 151/106 97.8  F (36.6  C) Temporal  88 22 97 % 109 kg (240 lb 4.8 oz)         Physical Exam  Nursing note and vitals reviewed.  Constitutional: Cooperative. Uncomfortable appearing.   HENT:   Mouth/Throat: Mucous membranes are normal.    Cardiovascular: Normal rate, regular rhythm and normal heart sounds.  No murmur.  Pulmonary/Chest: Effort normal and breath sounds normal. No respiratory distress. No wheezes. No rales.   Abdominal: Soft. Normal appearance and bowel sounds are normal. No distension. Diffuse nonlocalizing abdominal tenderness. There is no rigidity and no guarding.   Neurological: Alert. Oriented x4  Skin: Skin is warm and dry.   Psychiatric: Anxious appearing.    Emergency Department Course   Imaging:  CT Abdomen Pelvis without contrast:   No acute abnormality.   Report per radiology.   Radiographic findings were communicated with the patient who voiced understanding of the findings.    Laboratory:  CBC:  WBC 11.7 (H), HGB 13.9, , otherwise WNL   CMP: Glucose 114 (H), otherwise WNL (Creatinine 0.83)   Lipase: 375    Lactic Acid: 1.8     Interventions:  (0453) Toradol, 15 mg, IV Injection   (0453) Zofran, 4 mg, IV injection   (0454) Normal Saline, 1 liter, IV bolus     Emergency Department Course:  Nursing notes and vitals reviewed.  (3012) I performed an exam of the patient as documented above.    A peripheral IV was established. Blood was drawn from the patient. This was sent for laboratory testing, findings above.    The patient was sent for a CT Abdomen while in the emergency department, findings above.    (0470) I rechecked on the patient. She feels better.   Findings and plan explained to the patient. Patient discharged home with instructions regarding supportive care, medications, and reasons to return. The importance of close follow-up was reviewed.   I personally reviewed the laboratory results with the patient and answered all related questions prior to discharge.      Impression & Plan    Medical Decision  Making:  Onofre Robertson is a 58 year old female who presents with abdominal cramping associated with vomiting and diarrhea. She has had intermittent episodes of this for years. She has been seen by gastroenterology and was actually scheduled for an EDG on Thursday. Workup here has been reassuring with no acute findings found on CT imaging. Labs other than a slight leukocytosis are completely benign and after the above interventions, she feels much better. No indication for further workup. No concern for an acute surgical process, need for admission, or emergent gastroenterology  consultation.      Diagnosis:    ICD-10-CM    1. Abdominal pain, generalized R10.84    2. Vomiting and diarrhea R11.10     R19.7        Disposition:  discharged to home    Discharge Medications:  New Prescriptions    ONDANSETRON (ZOFRAN ODT) 4 MG ODT TAB    Take 1 tablet (4 mg) by mouth every 6 hours as needed for nausea         Nadeen WOOD, judson serving as a scribe on 9/17/2018 at 4:42 AM to personally document services performed by Dr. Zhu based on my observations and the provider's statements to me.    9/17/2018   [facility] EMERGENCY DEPARTMENT       Anurag Zhu MD  09/17/18 0574

## 2018-09-17 NOTE — ED AVS SNAPSHOT
[facility] Emergency Department    [address]    [facility] [address]    Phone:  [phone]    Fax:  [phone]                                       Onofre Robertson   MRN: [id number]    Department:  [facility] Emergency Department   Date of Visit:  9/17/2018           After Visit Summary Signature Page     I have received my discharge instructions, and my questions have been answered. I have discussed any challenges I see with this plan with the nurse or doctor.    ..........................................................................................................................................  Patient/Patient Representative Signature      ..........................................................................................................................................  Patient Representative Print Name and Relationship to Patient    ..................................................               ................................................  Date                                   Time    ..........................................................................................................................................  Reviewed by Signature/Title    ...................................................              ..............................................  Date                                               Time          22EPIC Rev 08/18

## 2018-09-20 ENCOUNTER — TRANSFERRED RECORDS (OUTPATIENT)
Dept: HEALTH INFORMATION MANAGEMENT | Facility: CLINIC | Age: 58
End: 2018-09-20

## 2018-09-26 ENCOUNTER — TRANSFERRED RECORDS (OUTPATIENT)
Dept: HEALTH INFORMATION MANAGEMENT | Facility: CLINIC | Age: 58
End: 2018-09-26

## 2018-10-12 ENCOUNTER — TRANSFERRED RECORDS (OUTPATIENT)
Dept: HEALTH INFORMATION MANAGEMENT | Facility: CLINIC | Age: 58
End: 2018-10-12

## 2018-10-12 ENCOUNTER — HOSPITAL ENCOUNTER (OUTPATIENT)
Dept: NUCLEAR MEDICINE | Facility: CLINIC | Age: 58
Setting detail: NUCLEAR MEDICINE
Discharge: HOME | End: 2018-10-12
Attending: INTERNAL MEDICINE | Admitting: INTERNAL MEDICINE
Payer: COMMERCIAL

## 2018-10-12 DIAGNOSIS — R11.10 VOMITING, INTRACTABILITY OF VOMITING NOT SPECIFIED, PRESENCE OF NAUSEA NOT SPECIFIED, UNSPECIFIED VOMITING TYPE: ICD-10-CM

## 2018-10-12 PROCEDURE — 78264 GASTRIC EMPTYING IMG STUDY: CPT

## 2018-10-12 PROCEDURE — 34300033 ZZH RX 343: Performed by: RADIOLOGY

## 2018-10-12 PROCEDURE — A9541 TC99M SULFUR COLLOID: HCPCS | Performed by: RADIOLOGY

## 2018-10-12 RX ADMIN — Medication 2 MILLICURIE: at 08:05

## 2018-11-20 ENCOUNTER — TRANSFERRED RECORDS (OUTPATIENT)
Dept: HEALTH INFORMATION MANAGEMENT | Facility: CLINIC | Age: 58
End: 2018-11-20

## 2018-11-28 ENCOUNTER — TELEPHONE (OUTPATIENT)
Dept: DERMATOLOGY | Facility: CLINIC | Age: 58
End: 2018-11-28

## 2018-11-28 NOTE — TELEPHONE ENCOUNTER
Health Call Center    Phone Message    May a detailed message be left on voicemail: yes    Reason for Call: Symptoms or Concerns     If patient has red-flag symptoms, warm transfer to triage line    Current symptom or concern: New Pt has been diagnosed in another clinic with Mohs and would like to be scheduled for treatment.  Symptoms have been present for:    Has patient previously been seen for this? Yes    By UNKNOWN    Date: UNKNOWN    Are there any new or worsening symptoms? No      Action Taken: Message routed to:  [facility] ([facility] dermatology

## 2018-11-29 ENCOUNTER — PRE VISIT (OUTPATIENT)
Dept: DERMATOLOGY | Facility: CLINIC | Age: 58
End: 2018-11-29

## 2018-11-29 NOTE — TELEPHONE ENCOUNTER
Medical Records Request For Appointment  URGENT!            To: Lito Dow From: Aileen Bejarano - Clinic Coordinator  [facility]   Fax: [phone] Fax: [phone]   Date: 2018   Phone: [phone]   Pages: 1 Mailing Address: [facility]  Attn: Aileen Bejarano, Clinic Coordinator   [address], [address]  [place] [address]     PATIENT: Onofre Robertson  : 1960  REFFERRED BY:   Please send most recent pathology report      Please fax medical records to fax # [phone] for patient s upcoming appointment in the Interventional Radiology Clinic.     Patient is being seen for: BCC Nasal    PLEASE SEND:  TIME FRAME NEEDED:      Referring MD notes, office visit notes with any other related providers  1 Year     Related Pathology Reports All Dates     ED/UC/Hospital discharge notes 1 Year     Medication List Current     Any Related Imaging (X-ray)       *Reports and images*   1 Year      Please push images to [employer] PACS or mail the imaging disc    URGENT [employer] account #[id number] to the address above.  NON URGENT mail to the above address via [employer]       ** If no records related to Interventional Radiology, please send most recent lab work and physical. **          Confidentiality Notice:  The document(s) accompanying this fax may contain protected health information under state and federal law and is legally privileged.  The information is only for the use of the intended recipient named above.  The recipient or person responsible for delivering this information is prohibited by law from disclosing this information without proper authorization to any other party, unless required to do so by law or regulation.  If you are not the intended recipient, you are hereby notified that any review, dissemination, distribution, or copying of this message is strictly prohibited.  If you have received this communication in error, please notify us immediately by  telephone to arrange for the return or destruction of the faxed documents.  Thank you.

## 2018-11-29 NOTE — TELEPHONE ENCOUNTER
Imaging Received  From Patient   Image Type (x): Disc:    PACS:     Exam Date/Name Photo BCC left nose Comments: Email sent to Dr. Dnun

## 2018-12-04 ENCOUNTER — PATIENT OUTREACH (OUTPATIENT)
Dept: CARE COORDINATION | Facility: CLINIC | Age: 58
End: 2018-12-04

## 2018-12-05 ENCOUNTER — TELEPHONE (OUTPATIENT)
Dept: DERMATOLOGY | Facility: CLINIC | Age: 58
End: 2018-12-05

## 2018-12-05 NOTE — TELEPHONE ENCOUNTER
Patient requested appt without consult. She should be aware of the possibility of a  2nd procedure 3-4 weeks later if we need to do 2 stage reconstruction. Per Dr. Dunn  Please contact clinic if pt returns call.

## 2018-12-06 ENCOUNTER — OFFICE VISIT (OUTPATIENT)
Dept: DERMATOLOGY | Facility: CLINIC | Age: 58
End: 2018-12-06
Payer: COMMERCIAL

## 2018-12-06 VITALS — DIASTOLIC BLOOD PRESSURE: 64 MMHG | SYSTOLIC BLOOD PRESSURE: 140 MMHG | HEART RATE: 64 BPM

## 2018-12-06 DIAGNOSIS — D48.9 NEOPLASM OF UNCERTAIN BEHAVIOR: ICD-10-CM

## 2018-12-06 DIAGNOSIS — C44.311 BASAL CELL CARCINOMA OF NOSE: Primary | ICD-10-CM

## 2018-12-06 RX ORDER — DIAZEPAM 5 MG
5 TABLET ORAL ONCE
Qty: 1 TABLET | Refills: 0 | Status: SHIPPED | OUTPATIENT
Start: 2018-12-06 | End: 2018-12-06

## 2018-12-06 ASSESSMENT — PAIN SCALES - GENERAL: PAINLEVEL: NO PAIN (0)

## 2018-12-06 NOTE — NURSING NOTE
Onofre was given Diazepam 5 mg orally. Patient tolerated it well.    Diazepam 5 mg    EXP: 3/2019  LOT: [id number]  [phone]

## 2018-12-06 NOTE — NURSING NOTE
Chief Complaint   Patient presents with     Derm Problem     mohs BCC on nose      Whitney Tyson, EMT

## 2018-12-06 NOTE — LETTER
12/6/2018       RE: Onofre Robertson  [address]  [facility] [address]     Dear Colleague,    Thank you for referring your patient, Onofre Robertson, to the [facility] DERMATOLOGIC SURGERY at [facility]. Please see a copy of my visit note below.    [facility] Mohs Dermatologic Surgery Procedure w/ Shave Bx Note    Dermatology Surgery Clinic  [facility]  [address] [place] [address]    Date of Service:  Dec 6, 2018  Surgery: Mohs micrographic surgery    Primary Surgeon: Dr. Dunn  Assistant Surgeon: Dr. Mike    Case 1  Repair Type: local flap (rotation)  Repair Size: 2.1 cm  Suture Material: Fast Absorbing Gut 5-0  Tumor Type: BCC - Basal cell carcinoma  Location: left side nose  Derm-Path Accession #: [id number]/A  PreOp Size: 0.2 x 0.3 cm  PostOp Size: 1.0 x 0.6 cm  Mohs Accession #:  IM  Level of Defect: fascia      Procedure:  We discussed the principles of treatment and most likely complications including scarring, bleeding, infection, swelling, pain, crusting, nerve damage, large wound,  incomplete excision, wound dehiscence,  nerve damage, recurrence, and a second procedure may be recommended to obtain the best cosmetic or functional result.    Informed consent was obtained and the patient underwent the procedure as follows:  The patient was placed supine on the operating table.  The cancer was identified, outlined with a marker, and verified by the patient.  The entire surgical field was prepped with ChoraPrep.  The surgical site was anesthetized using Lidocaine 1%.      The area of clinically apparent tumor was not debulked. The layer of tissue was then surgically excised using a #15 blade and was then transferred onto a specimen sheet maintaining the orientation of the specimen. Hemostasis was obtained using heat cautery. The wound site was then covered with a dressing while the tissue samples  were processed for examination.    The excised tissue was transported to the Mohs histology laboratory maintaining the tissue orientation.  The tissue specimen was relaxed so that the entire surgical margin was in a a single horizontal plane for sectioning and inked for precise mapping.  A precise reference map was drawn to reflect the sectioning of the specimen, colored inking of the margins, and orientation on the patient. The tissue was processed using horizontal sectioning of the base and continuous peripheral margins.  The histopathologic sections were reviewed in conjunction with the reference map.    Total blocks: 1    Total slides:  2    There were no cancer cells visualized on examination, therefore Mohs surgery was complete.      PROCEDURE:  ROTATION FLAP       Case(s) Specific Information:    CASE 1    The patient was taken to the operative suite and placed in a  supine position on the operating room table. The wound was identified and infiltrated with Lidocaine 1% 3.0ml. The defect was then cleansed and prepped with ChoraPrep and draped with sterile drapes. Using a marker, the rotation flap was planned.  The wound edges were then debeveled and the wound was undermined bluntly in all directions. The rotation flap was incised sharply to the level of fat.  The flap was undermined from all surrounding tissue. Hemostasis was obtained with heat cautery. The flap was rotated into the primary defect and secured with buried vertical mattress sutures. The secondary defect and flap closed with deep dermal sutures. Epidermal tissue was carefully approximated using simple running epidermal sutures throughout the length of the flap.  Redundant skin was excised by the triangulation technique and closed in similar fashion. The wound was cleansed with sterile saline and vaseline was applied. A sterile non-adherent pressure dressing was placed.  The patient left the operating suite in stable condition.  The patient will  return to see Suture removal in 2 weeks for suture removal. Wound care was reviewed verbally and in writing. Anticipate Dermabrasion to be used as a second stage of this reconstruction.     Repair Size: 2.1 cm    Sutures Used:  Deep: monocryl 5-0 Superficial: Fast Absorbing Gut 5-0   Anesthesia Visit Total (ml): 5.5 ml    The Attending Physician for the entire procedure and always immediately available.    Shave Biopsy Procedure:  Shave biopsy:  After discussion of risks (including but not limited to bleeding/bruising, pain/swelling, infection, scar, incomplete removal, nerve damage/numbness, recurrence, and non-diagnostic biopsy), benefits and alternatives, informed consent was obtained (verbal and written) for biopsy. The area(s) was/were cleaned with isopropyl alcohol or chlorhexidine and anesthetized. Shave biopsy/biopsies was/were performed on the L cheek. Hemostasis was achieved with electrocautery. Vaseline and a sterile dressing were applied. The patient tolerated the procedure and no complications were noted. The patient was provided with verbal and written post care instructions.      Staff Involved:    Scribe Disclosure  I, Onel Martinez, am serving as a scribe to document services personally performed by Dr. Eduin Dunn, based on data collection and the provider's statements to me.     Attending attestation:  I personally performed the entire procedure.  I have reviewed the note and edited it as necessary, and agree with its contents.      Dermatology Surgery Clinic  [facility]  [address] [place] [id number]      Eduin Dunn M.D.    Director of Dermatologic Surgery  Department of Dermatology  [facility]

## 2018-12-06 NOTE — PATIENT INSTRUCTIONS
Wound Care Instructions  I will experience scar, altered skin color, bleeding, swelling, pain, crusting and redness. I may experience altered sensation. Risks are excessive bleeding, infection, muscle weakness, thick (hypertrophic or keloidal) scar, and recurrence,. A second procedure may be recommended to obtain the best cosmetic or functional result.  Possible complications of any surgical procedure are bleeding, infection, scarring, alteration in skin color and sensation, muscle weakness in the area, wound dehiscence or seperation, or recurrence of the lesion or disease. On occasion, after healing, a secondary procedure or revision may be recommended in order to obtain the best cosmetic or functional result.   After your surgery, a pressure bandage will be placed over the area that has sutures. This will help prevent bleeding.   For the First 48 hours After Surgery:  1. Leave the pressure bandage on and keep it dry. If it should come loose, you may retape it, but do not take it off.  2. Relax and take it easy. Do not do any vigorous exercise, heavy lifting, or bending forward. This could cause the wound to bleed.  3. Post-operative pain is usually mild. You may take plain or extra strength Tylenol every 4 hours as needed (do not take more than 4,000mg in one day). Do not take any medicine that contains aspirin, ibuprofen or motrin unless you have been recommended these by a doctor.  Avoid alcohol and vitamin E as these may increase your tendency to bleed.  4. You may put an ice pack around the bandaged area for 20 minutes every 2-3 hours. This may help reduce swelling, bruising, and pain. Make sure the ice pack is waterproof so that the pressure bandage does not get wet.   5. You may see a small amount of drainage or blood on your pressure bandage. This is normal. However, if drainage or bleeding continues or saturates the bandage, you will need to apply firm pressure over the bandage with a washcloth for 15  minutes. If bleeding continues after applying pressure for 15 minutes then go to the nearest emergency room.  48 Hours After Surgery  Carefully remove the bandage and start daily wound care and dressing changes. You may also now shower and get the wound wet. Wash wound with a mild soap and water.  Use caution when washing the wound. Be gentle and do not let the forceful shower stream hit the wound directly.  PAT dry.  Daily Wound Care:  1. Wash wound with a mild soap and water.  Use caution when washing the wound, be gentle and do not let the forceful shower stream hit the wound directly.  2. PAT DRY.  3. Apply Vaseline (from a new container or tube) over the suture line with a Q-tip. It is very important to keep the wound continuously moist, as wounds heal best in a moist environment.  4.  Keep the site covered until sutures are removed, you can cover it with a Telfa (non-stick) dressing and tape or a band-aid.    5. If you are unable to keep wound covered, you must apply Vaseline every 2 - 3 hours (while awake) to ensure it is being kept moist for optimal healing. A dressing overnight is recommended to keep the area moist.   Call Us If:  1. You have pain that is not controlled with Tylenol.  2. You have signs or symptoms of an infection, such as: fever over 100 degrees F, redness, warmth, or foul-smelling or yellow/creamy drainage from the wound.  Who should I call with questions?    [facility]: [phone]     [facility]: [phone]    For urgent needs outside of business hours call the [facility] at [phone] and ask for the dermatology resident on call

## 2018-12-06 NOTE — PROGRESS NOTES
[facility] Dermatologic Surgery Procedure w/ Shave Bx Note    Dermatology Surgery Clinic  [facility]  [address] [place] [address]    Date of Service:  Dec 6, 2018  Surgery: Mohs micrographic surgery    Primary Surgeon: Dr. Dunn  Assistant Surgeon: Dr. Mike    Case 1  Repair Type: local flap (rotation)  Repair Size: 2.1 cm  Suture Material: Fast Absorbing Gut 5-0  Tumor Type: BCC - Basal cell carcinoma  Location: left side nose  Derm-Path Accession #: [id number]/A  PreOp Size: 0.2 x 0.3 cm  PostOp Size: 1.0 x 0.6 cm  Mohs Accession #:  IM  Level of Defect: fascia      Procedure:  We discussed the principles of treatment and most likely complications including scarring, bleeding, infection, swelling, pain, crusting, nerve damage, large wound,  incomplete excision, wound dehiscence,  nerve damage, recurrence, and a second procedure may be recommended to obtain the best cosmetic or functional result.    Informed consent was obtained and the patient underwent the procedure as follows:  The patient was placed supine on the operating table.  The cancer was identified, outlined with a marker, and verified by the patient.  The entire surgical field was prepped with ChoraPrep.  The surgical site was anesthetized using Lidocaine 1%.      The area of clinically apparent tumor was not debulked. The layer of tissue was then surgically excised using a #15 blade and was then transferred onto a specimen sheet maintaining the orientation of the specimen. Hemostasis was obtained using heat cautery. The wound site was then covered with a dressing while the tissue samples were processed for examination.    The excised tissue was transported to the Mohs histology laboratory maintaining the tissue orientation.  The tissue specimen was relaxed so that the entire surgical margin was in a a single horizontal plane for sectioning and inked for precise mapping.   A precise reference map was drawn to reflect the sectioning of the specimen, colored inking of the margins, and orientation on the patient. The tissue was processed using horizontal sectioning of the base and continuous peripheral margins.  The histopathologic sections were reviewed in conjunction with the reference map.    Total blocks: 1    Total slides:  2    There were no cancer cells visualized on examination, therefore Mohs surgery was complete.      PROCEDURE:  ROTATION FLAP       Case(s) Specific Information:    CASE 1    The patient was taken to the operative suite and placed in a  supine position on the operating room table. The wound was identified and infiltrated with Lidocaine 1% 3.0ml. The defect was then cleansed and prepped with ChoraPrep and draped with sterile drapes. Using a marker, the rotation flap was planned.  The wound edges were then debeveled and the wound was undermined bluntly in all directions. The rotation flap was incised sharply to the level of fat.  The flap was undermined from all surrounding tissue. Hemostasis was obtained with heat cautery. The flap was rotated into the primary defect and secured with buried vertical mattress sutures. The secondary defect and flap closed with deep dermal sutures. Epidermal tissue was carefully approximated using simple running epidermal sutures throughout the length of the flap.  Redundant skin was excised by the triangulation technique and closed in similar fashion. The wound was cleansed with sterile saline and vaseline was applied. A sterile non-adherent pressure dressing was placed.  The patient left the operating suite in stable condition.  The patient will return to see Suture removal in 2 weeks for suture removal. Wound care was reviewed verbally and in writing. Anticipate Dermabrasion to be used as a second stage of this reconstruction.     Repair Size: 2.1 cm    Sutures Used:  Deep: monocryl 5-0 Superficial: Fast Absorbing Gut 5-0    Anesthesia Visit Total (ml): 5.5 ml    The Attending Physician for the entire procedure and always immediately available.    Shave Biopsy Procedure:  Shave biopsy:  After discussion of risks (including but not limited to bleeding/bruising, pain/swelling, infection, scar, incomplete removal, nerve damage/numbness, recurrence, and non-diagnostic biopsy), benefits and alternatives, informed consent was obtained (verbal and written) for biopsy. The area(s) was/were cleaned with isopropyl alcohol or chlorhexidine and anesthetized. Shave biopsy/biopsies was/were performed on the L cheek. Hemostasis was achieved with electrocautery. Vaseline and a sterile dressing were applied. The patient tolerated the procedure and no complications were noted. The patient was provided with verbal and written post care instructions.      Staff Involved:    Scribe Disclosure  I, Onel Martinez, am serving as a scribe to document services personally performed by Dr. Eduin Dunn, based on data collection and the provider's statements to me.     Attending attestation:  I personally performed the entire procedure.  I have reviewed the note and edited it as necessary, and agree with its contents.    Eduin Dunn M.D.    Director of Dermatologic Surgery  Department of Dermatology  [facility]    Dermatology Surgery Clinic  [facility] [place] [address]

## 2018-12-06 NOTE — MR AVS SNAPSHOT
After Visit Summary   12/6/2018    Onofre Robertson    MRN: [id number]           Patient Information     Date Of Birth          1960        Visit Information        Provider Department      12/6/2018 9:30 AM Eduin Dunn MD M [facility] Dermatologic Surgery        Today's Diagnoses     Basal cell carcinoma of nose    -  1      Care Instructions    Wound Care Instructions  I will experience scar, altered skin color, bleeding, swelling, pain, crusting and redness. I may experience altered sensation. Risks are excessive bleeding, infection, muscle weakness, thick (hypertrophic or keloidal) scar, and recurrence,. A second procedure may be recommended to obtain the best cosmetic or functional result.  Possible complications of any surgical procedure are bleeding, infection, scarring, alteration in skin color and sensation, muscle weakness in the area, wound dehiscence or seperation, or recurrence of the lesion or disease. On occasion, after healing, a secondary procedure or revision may be recommended in order to obtain the best cosmetic or functional result.   After your surgery, a pressure bandage will be placed over the area that has sutures. This will help prevent bleeding.   For the First 48 hours After Surgery:  1. Leave the pressure bandage on and keep it dry. If it should come loose, you may retape it, but do not take it off.  2. Relax and take it easy. Do not do any vigorous exercise, heavy lifting, or bending forward. This could cause the wound to bleed.  3. Post-operative pain is usually mild. You may take plain or extra strength Tylenol every 4 hours as needed (do not take more than 4,000mg in one day). Do not take any medicine that contains aspirin, ibuprofen or motrin unless you have been recommended these by a doctor.  Avoid alcohol and vitamin E as these may increase your tendency to bleed.  4. You may put an ice pack around the bandaged area for 20 minutes every 2-3 hours. This may help  reduce swelling, bruising, and pain. Make sure the ice pack is waterproof so that the pressure bandage does not get wet.   5. You may see a small amount of drainage or blood on your pressure bandage. This is normal. However, if drainage or bleeding continues or saturates the bandage, you will need to apply firm pressure over the bandage with a washcloth for 15 minutes. If bleeding continues after applying pressure for 15 minutes then go to the nearest emergency room.  48 Hours After Surgery  Carefully remove the bandage and start daily wound care and dressing changes. You may also now shower and get the wound wet. Wash wound with a mild soap and water.  Use caution when washing the wound. Be gentle and do not let the forceful shower stream hit the wound directly.  PAT dry.  Daily Wound Care:  1. Wash wound with a mild soap and water.  Use caution when washing the wound, be gentle and do not let the forceful shower stream hit the wound directly.  2. PAT DRY.  3. Apply Vaseline (from a new container or tube) over the suture line with a Q-tip. It is very important to keep the wound continuously moist, as wounds heal best in a moist environment.  4.  Keep the site covered until sutures are removed, you can cover it with a Telfa (non-stick) dressing and tape or a band-aid.    5. If you are unable to keep wound covered, you must apply Vaseline every 2 - 3 hours (while awake) to ensure it is being kept moist for optimal healing. A dressing overnight is recommended to keep the area moist.   Call Us If:  1. You have pain that is not controlled with Tylenol.  2. You have signs or symptoms of an infection, such as: fever over 100 degrees F, redness, warmth, or foul-smelling or yellow/creamy drainage from the wound.  Who should I call with questions?    [facility]: [phone]     [facility]: [phone]    For urgent needs outside of business hours call the [facility] at [phone] and ask for the dermatology resident on call              Follow-ups after your visit        Who to contact     Please call your clinic at [phone] to:    Ask questions about your health    Make or cancel appointments    Discuss your medicines    Learn about your test results    Speak to your doctor            Additional Information About Your Visit        [employer] Information     [employer] gives you secure access to your electronic health record. If you see a primary care provider, you can also send messages to your care team and make appointments. If you have questions, please call your primary care clinic.  If you do not have a primary care provider, please call [phone] and they will assist you.      [employer] is an electronic gateway that provides easy, online access to your medical records. With [employer], you can request a clinic appointment, read your test results, renew a prescription or communicate with your care team.     To access your existing account, please contact your [facility] or call [phone] for assistance.        Care EveryWhere ID     This is your Care EveryWhere ID. This could be used by other organizations to access your [place] medical records  [id number]        Your Vitals Were     Pulse Last Period                64 02/20/2015           Blood Pressure from Last 3 Encounters:   12/06/18 140/64   09/17/18 120/54   05/31/18 124/82    Weight from Last 3 Encounters:   09/17/18 109 kg (240 lb 4.8 oz)   04/18/18 107.9 kg (237 lb 12.8 oz)   02/27/18 107.5 kg (237 lb)              Today, you had the following     No orders found for display         Today's Medication Changes          These changes are accurate as of 12/6/18 11:20 AM.  If you have any questions, ask your nurse or doctor.               Start taking these medicines.        Dose/Directions    diazepam 5 MG tablet   Commonly known as:  VALIUM   Used for:  Basal cell  carcinoma of nose   Started by:  Eduin Dunn MD        Dose:  5 mg   Take 1 tablet (5 mg) by mouth once for 1 dose   Quantity:  1 tablet   Refills:  0            Where to get your medicines      Some of these will need a paper prescription and others can be bought over the counter.  Ask your nurse if you have questions.     Bring a paper prescription for each of these medications     diazepam 5 MG tablet                Primary Care Provider Office Phone # Fax #    Jacob Nicole [phone] [address] [facility] [address]  [facility] [address]        Equal Access to Services     [facility]: Hadii aad ku hadasho Soomaali, waaxda luqadaha, qaybta kaalmada susanne, teagan garvey . So [facility] [phone].    ATENCIÓN: Si habla español, tiene a martinez disposición servicios gratuitos de asistencia lingüística. [facility] [phone].    We comply with applicable federal civil rights laws and [place] laws. We do not discriminate on the basis of race, color, national origin, age, disability, sex, sexual orientation, or gender identity.            Thank you!     Thank you for choosing [facility] DERMATOLOGIC SURGERY  for your care. Our goal is always to provide you with excellent care. Hearing back from our patients is one way we can continue to improve our services. Please take a few minutes to complete the written survey that you may receive in the mail after your visit with us. Thank you!             Your Updated Medication List - Protect others around you: Learn how to safely use, store and throw away your medicines at www.disposemymeds.org.          This list is accurate as of 12/6/18 11:20 AM.  Always use your most recent med list.                   Brand Name Dispense Instructions for use Diagnosis    albuterol 108 (90 Base) MCG/ACT inhaler    PROVENTIL HFA    1 Inhaler    Inhale 2 puffs into the lungs every 6 hours    Wheezing       diazepam 5 MG tablet    VALIUM    1  tablet    Take 1 tablet (5 mg) by mouth once for 1 dose    Basal cell carcinoma of nose       lidocaine 5 % patch    LIDODERM    10 patch    Place 1 patch onto the skin every 24 hours    Chronic midline low back pain without sciatica       omeprazole 40 MG DR capsule    priLOSEC    90 capsule    Take 1 capsule (40 mg) by mouth daily    Gastroesophageal reflux disease without esophagitis       SYNTHROID 137 MCG tablet   Generic drug:  levothyroxine      Take 150 mcg by mouth daily        zolpidem 5 MG tablet    AMBIEN    1 tablet    Take tablet by mouth 15 minutes prior to sleep, for Sleep Study    Morbid obesity (H), Snoring, Psychophysiological insomnia, Gasping for breath, Witnessed episode of apnea

## 2018-12-08 DIAGNOSIS — R06.2 WHEEZING: ICD-10-CM

## 2018-12-08 RX ORDER — ALBUTEROL SULFATE 90 UG/1
AEROSOL, METERED RESPIRATORY (INHALATION)
Qty: 18 G | Refills: 0 | OUTPATIENT
Start: 2018-12-08

## 2018-12-16 LAB — COPATH REPORT: NORMAL

## 2018-12-31 ENCOUNTER — TELEPHONE (OUTPATIENT)
Dept: FAMILY MEDICINE | Facility: CLINIC | Age: 58
End: 2018-12-31

## 2018-12-31 DIAGNOSIS — G89.29 CHRONIC MIDLINE LOW BACK PAIN WITHOUT SCIATICA: ICD-10-CM

## 2018-12-31 DIAGNOSIS — M54.50 CHRONIC MIDLINE LOW BACK PAIN WITHOUT SCIATICA: ICD-10-CM

## 2018-12-31 RX ORDER — LIDOCAINE 50 MG/G
PATCH TOPICAL
Qty: 30 PATCH | Refills: 3 | Status: SHIPPED | OUTPATIENT
Start: 2018-12-31 | End: 2020-04-22

## 2018-12-31 NOTE — TELEPHONE ENCOUNTER
lidocaine (LIDODERM) 5 % patch 10 patch 0 12/11/2018     Last Written Prescription Date:  12/11/18  Last Fill Quantity: 10,  # refills: 0   Last office visit: 9/12/2017 with prescribing provider:  Corey   Future Office Visit:  None    There are no medications in this encounter.     No flowsheet data found.

## 2018-12-31 NOTE — TELEPHONE ENCOUNTER
Reason for Call:  Medication or medication refill:    Do you use a [facility]?  Name of the pharmacy and phone number for the current request:  [facility]     Name of the medication requested: Lidocaine     Other [facility] [phone]    Can we leave a detailed message on this number? YES    Phone number patient can be reached at: Home number on file [phone] (home)    Best Time: anytime     Call taken on 12/31/2018 at 9:40 AM by Alexandrea Pfeiffer

## 2019-07-10 ENCOUNTER — DOCUMENTATION ONLY (OUTPATIENT)
Dept: CARE COORDINATION | Facility: CLINIC | Age: 59
End: 2019-07-10

## 2019-08-05 ENCOUNTER — OFFICE VISIT (OUTPATIENT)
Dept: FAMILY MEDICINE | Facility: CLINIC | Age: 59
End: 2019-08-05
Payer: COMMERCIAL

## 2019-08-05 VITALS
HEIGHT: 63 IN | HEART RATE: 67 BPM | OXYGEN SATURATION: 97 % | TEMPERATURE: 97.8 F | DIASTOLIC BLOOD PRESSURE: 69 MMHG | SYSTOLIC BLOOD PRESSURE: 127 MMHG | BODY MASS INDEX: 43.05 KG/M2 | WEIGHT: 243 LBS

## 2019-08-05 DIAGNOSIS — E66.01 MORBID OBESITY (H): ICD-10-CM

## 2019-08-05 DIAGNOSIS — R07.89 ATYPICAL CHEST PAIN: Primary | ICD-10-CM

## 2019-08-05 DIAGNOSIS — E03.9 HYPOTHYROIDISM, UNSPECIFIED TYPE: ICD-10-CM

## 2019-08-05 DIAGNOSIS — Z12.31 VISIT FOR SCREENING MAMMOGRAM: ICD-10-CM

## 2019-08-05 LAB — TROPONIN I SERPL-MCNC: <0.015 UG/L (ref 0–0.04)

## 2019-08-05 PROCEDURE — 36415 COLL VENOUS BLD VENIPUNCTURE: CPT | Performed by: INTERNAL MEDICINE

## 2019-08-05 PROCEDURE — 90471 IMMUNIZATION ADMIN: CPT | Performed by: INTERNAL MEDICINE

## 2019-08-05 PROCEDURE — 84484 ASSAY OF TROPONIN QUANT: CPT | Performed by: INTERNAL MEDICINE

## 2019-08-05 PROCEDURE — 90714 TD VACC NO PRESV 7 YRS+ IM: CPT | Performed by: INTERNAL MEDICINE

## 2019-08-05 PROCEDURE — 99214 OFFICE O/P EST MOD 30 MIN: CPT | Mod: 25 | Performed by: INTERNAL MEDICINE

## 2019-08-05 ASSESSMENT — MIFFLIN-ST. JEOR: SCORE: 1644.36

## 2019-08-05 NOTE — PROGRESS NOTES
Screening Questionnaire for Adult Immunization    Are you sick today?   No   Do you have allergies to medications, food, a vaccine component or latex?   Yes   Have you ever had a serious reaction after receiving a vaccination?   No   Do you have a long-term health problem with heart disease, lung disease, asthma, kidney disease, metabolic disease (e.g. diabetes), anemia, or other blood disorder?   No   Do you have cancer, leukemia, HIV/AIDS, or any other immune system problem?   Yes   In the past 3 months, have you taken medications that affect  your immune system, such as prednisone, other steroids, or anticancer drugs; drugs for the treatment of rheumatoid arthritis, Crohn s disease, or psoriasis; or have you had radiation treatments?   No   Have you had a seizure, or a brain or other nervous system problem?   No   During the past year, have you received a transfusion of blood or blood     products, or been given immune (gamma) globulin or antiviral drug?   No   For women: Are you pregnant or is there a chance you could become        pregnant during the next month?   No   Have you received any vaccinations in the past 4 weeks?   No     Immunization questionnaire was positive for at least one answer.  Notified Dr. Nicole.        Per orders of Dr. Nicole, injection of Td given by Tita Dozier. Patient instructed to remain in clinic for 15 minutes afterwards, and to report any adverse reaction to me immediately.       Screening performed by Tita Dozier on 8/5/2019 at 12:02 PM.

## 2019-08-05 NOTE — PATIENT INSTRUCTIONS
(R07.89) Atypical chest pain  (primary encounter diagnosis)  Comment: We will check EKG today and troponin and refer for stress echocardiogram [facility] - [phone]   Plan: TD (ADULT, 7+) PRESERVE FREE, EKG 12-lead         complete w/read - Clinics, Troponin I,         Echocardiogram Exercise Stress            (E03.9) Hypothyroidism, unspecified type  Comment: Plan to follow up in endocrinology   Plan: TD (ADULT, 7+) PRESERVE FREE            (E66.01) Morbid obesity (H)  Comment: Check labs today and continue to work out and eating well  Plan:     (Z12.31) Visit for screening mammogram  Comment:  [facility] (also performs diagnostic mammogram, ultrasound and biopsy) [phone].   Plan: *MA Screening Digital Bilateral

## 2019-08-05 NOTE — PROGRESS NOTES
"[facility]  CLINIC PROGRESS NOTE    Subjective:  Weight loss   Onofre Robertson has been working on weight loss.  She did see endocrinology and was recommended to continue current dose and work more on maintaining her energy through workout.  She has been doing both elipitical and weight training.  She has plans to start back at the gym this fall.       Past medical history, medications, allergies, social history, family history reviewed and updated in [facility] as of 8/5/2019 .    ROS  CONSTITUTIONAL: no fatigue, no unexpected change in weight  SKIN: no worrisome rashes, no worrisome moles, no worrisome lesions  EYES: no acute vision problems or changes  ENT: no ear problems, no mouth problems, no throat problems  RESP: no significant cough, no shortness of breath  CV: occasional fluttering and occasional left side chest pain  GI: no nausea, no vomiting, no constipation, no diarrhea  : no frequency, no dysuria, no hematuria  MS: as above   PSYCHIATRIC: no changes in mood or affect      Objective:  Vitals  /69 (BP Location: Right arm, Cuff Size: Adult Large)   Pulse 67   Temp 97.8  F (36.6  C) (Tympanic)   Ht 1.589 m (5' 2.56\")   Wt 110.2 kg (243 lb)   LMP 02/20/2015   SpO2 97%   Breastfeeding? No   BMI 43.65 kg/m    GEN: Alert Oriented x3 NAD  HEENT: Atraumatic, normocephalic, neck supple, no thyromegaly, negative cervical adenopathy  TM: TM bilaterally pearly and grey with normal light reflex  CV: RRR no murmurs or rubs  PULM: CTA no wheezes or crackles  ABD: Obese, soft, nontender nondistended  SKIN: No visible skin lesion or ulcerations  EXT: No edema bilateral lower extremities  NEURO: Gait and station normal, No focal neurologic deficits  PSYCH: Mood good, affect mood congruent    No images are attached to the encounter.    No results found for this or any previous visit (from the past 24 hour(s)).    Assessment/Plan:  Patient Instructions   (R07.89) Atypical chest pain  (primary " encounter diagnosis)  Comment: We will check EKG today and troponin and refer for stress echocardiogram [facility] - [phone]   Plan: TD (ADULT, 7+) PRESERVE FREE, EKG 12-lead         complete w/read - Clinics, Troponin I,         Echocardiogram Exercise Stress            (E03.9) Hypothyroidism, unspecified type  Comment: Plan to follow up in endocrinology   Plan: TD (ADULT, 7+) PRESERVE FREE            (E66.01) Morbid obesity (H)  Comment: Check labs today and continue to work out and eating well  Plan:     (Z12.31) Visit for screening mammogram  Comment:  [facility] (also performs diagnostic mammogram, ultrasound and biopsy) [phone].   Plan: *MA Screening Digital Bilateral               Follow up in 3 months    Disclaimer: This note consists of symbols derived from keyboarding, dictation and/or voice recognition software. As a result, there may be errors in the script that have gone undetected. Please consider this when interpreting information found in this chart.    Jacob Nicole MD  [phone]

## 2019-08-06 ENCOUNTER — OFFICE VISIT (OUTPATIENT)
Dept: DERMATOLOGY | Facility: CLINIC | Age: 59
End: 2019-08-06
Payer: COMMERCIAL

## 2019-08-06 DIAGNOSIS — Z12.83 SKIN CANCER SCREENING: ICD-10-CM

## 2019-08-06 DIAGNOSIS — Z85.828 HISTORY OF BASAL CELL CARCINOMA: ICD-10-CM

## 2019-08-06 DIAGNOSIS — D48.9 NEOPLASM OF UNCERTAIN BEHAVIOR: Primary | ICD-10-CM

## 2019-08-06 RX ORDER — LIDOCAINE HYDROCHLORIDE AND EPINEPHRINE 10; 10 MG/ML; UG/ML
3 INJECTION, SOLUTION INFILTRATION; PERINEURAL ONCE
Status: ACTIVE | OUTPATIENT
Start: 2019-08-06

## 2019-08-06 ASSESSMENT — PAIN SCALES - GENERAL
PAINLEVEL: NO PAIN (0)
PAINLEVEL: NO PAIN (0)

## 2019-08-06 NOTE — PATIENT INSTRUCTIONS

## 2019-08-06 NOTE — NURSING NOTE
Lidocaine-epinephrine 1-1:875163 % injection   1mL once for one use, starting 8/6/2019 ending 8/6/2019,  2mL disp, R-0, injection  Injected by JG Pineda

## 2019-08-06 NOTE — PROGRESS NOTES
[facility] Dermatology Note      Dermatology Problem List:  1. History of NMSC  -BCC on the left side of the nose, s/p MMS 12/6/2018  2. NUB on the left clavicle - s/p biopsy DDx: BCC vs fibrous papule vs Other 8/6/2019  3. NUB under the right nostril - s/p biopsy DDx: BCC vs fibrous papule vs Other 8/6/2019    Encounter Date: Aug 6, 2019    CC:  Chief Complaint   Patient presents with     Skin Check     Onofre is here today for a recheck on her nose from where she had surgery.          History of Present Illness:  Ms. Onofre Robertson is a 59 year old female who is a return patient to the clinic and presents for a skin check. The patient was last seen on 12/06/2018 by Dr. Dunn when MMS was performed on a BCC on the left side of the nose. States the area is now well healed and she is happy with the outcome.    At today's visit, the patient notes she has previously had a full body skin check at an outside provider one year ago. She notes lesions of concern on the right side of her nose that has been present for a couple of months. She states it has decreased in size over time but it has not receded. Does not bleed or itch. The patient denies painful, itching, tingling or bleeding lesions unless otherwise noted.    She now lives in [place], but spends summers in [place].     Past Medical History:   Patient Active Problem List   Diagnosis     Tobacco use disorder     Obesity     Hypothyroidism     Hyperlipidemia LDL goal <160     Osteoarthrosis involving multiple sites     Family history of malignant neoplasm of breast     Liver hemangioma     Esophageal reflux     Abdominal pain, generalized     IFG (impaired fasting glucose)     Lower back pain     Morbid obesity (H)     Past Medical History:   Diagnosis Date     Arthritis      Esophageal reflux      H pylori ulcer      Hypercholesterolemia      Hypothyroidism      Lyme disease      Thyroid cancer 1980     Past Surgical History:   Procedure Laterality Date  "     SECTION       COLONOSCOPY       CYSTOSCOPY N/A 2015    Procedure: CYSTOSCOPY;  Surgeon: Denisse Luong DO;  Location: [facility] OR     DAVINCI HYSTERECTOMY TOTAL, BILATERAL SALPINGO-OOPHORECTOMY, COMBINED Bilateral 2015    Procedure: COMBINED DAVINCI HYSTERECTOMY TOTAL, SALPINGO-OOPHORECTOMY;  Surgeon: Denisse Luong DO;  Location: [facility] OR     HYSTERECTOMY       LEFT BREAST LUMPECTOMY       LIGATE FALLOPIAN TUBE NOS       ORTHOPEDIC SURGERY       RHINOPLASTY       THYROIDECTOMY       TONSILLECTOMY         Social History:   reports that she quit smoking about 8 years ago. Her smoking use included hookah. She has a 22.50 pack-year smoking history. She uses smokeless tobacco. She reports that she drinks alcohol. She reports that she does not use drugs.    Family History:  Family History   Problem Relation Age of Onset     Heart Disease Father          59 YO MI X4,COLITIS,DM,OBESITY     Hyperlipidemia Father      Gastrointestinal Disease Mother         78 YO STOMACH ULCERS,QUIT DRINKING 12 YEARS AGO     Breast Cancer Mother      Thyroid Disease Mother      Other Cancer Mother      Neurologic Disorder Sister          21 YO EPILEPSY       Medications:  Current Outpatient Medications   Medication Sig Dispense Refill     levothyroxine (SYNTHROID) 137 MCG tablet Take 150 mcg by mouth daily        lidocaine (LIDODERM) 5 % patch APPLY 1 PATCH ONTO SKIN EVERY DAY 30 patch 3     omeprazole (PRILOSEC) 40 MG capsule Take 1 capsule (40 mg) by mouth daily 90 capsule 3       Allergies   Allergen Reactions     Contrast Dye      respiratory     Morphine Anaphylaxis     Penicillins Anaphylaxis     \"ANAPHYLACTIC\" ER     Topamax [Topiramate] Other (See Comments)     Stomach burning and rapid heart rate       Celexa [Citalopram] Itching, Swelling and Rash     Tongue and face numbness, couldn't taste right,  tongue swelling and itchy rash     Prozac [Fluoxetine] Itching, Swelling and Rash     " Tongue and face numbness, couldn't taste right, tongue swelling and itchy rash       Review of Systems:  -Constitutional: The patient denies fatigue, fevers, chills, unintended weight loss, and night sweats.  -HEENT: Patient denies nonhealing oral sores.  -Skin: As above in HPI. No additional skin concerns.  -Heme/Lymph: no concerning bumps, no bleeding or bruising problems     Physical exam:  Vitals: LMP 02/20/2015   GEN: This is a well developed, well-nourished female in no acute distress, in a pleasant mood.    SKIN: Total skin excluding the undergarment areas was performed. The exam included the head/face, neck, both arms, chest, back, abdomen, both legs, digits and/or nails.   -1-2mm pink papule under the right nostril  -3-4mm shiny pink papule on the left clavicle  -Well healed, barely visible scar on the left side of the nose, NERD  -No other lesions of concern on areas examined.       Impression/Plan:  1. History of NMSC - BCC of left side of nose s/p [facility] 12/06/2018 - NERD    No evidence of recurrence    ABCDs of melanoma were discussed and self skin checks were advised.    Sun precaution was advised including the use of sun screens of SPF 30 or higher, sun protective clothing, and avoidance of tanning beds.    Patient to return for bi annual skin exams - lives in [place], so she comes every 8mo or so.    2. Skin cancer screening    ABCDs of melanoma were discussed and self skin checks were advised.     Sun precaution was advised including the use of sun screens of SPF 30 or higher, sun protective clothing, and avoidance of tanning beds.    3. Neoplasm of uncertain behavior on the right nostril. The differential diagnosis includes BCC vs fibrous papule vs Other.     Punch biopsy:  After discussion of benefits and risks including but not limited to bleeding/bruising, pain/swelling, infection, scar, incomplete removal, nerve damage/numbness, recurrence, and non-diagnostic biopsy, written consent, verbal consent and  photographs were obtained. Time-out was performed. The area was cleaned with isopropyl alcohol. 0.5mL of 1% lidocaine with 1:100,000 epinephrine was injected to obtain adequate anesthesia of the lesion on the right nostril.  A 2 mm punch biopsy was performed. Gel foam was utilized to approximate the epidermal edges. White petroleum jelly/Vaseline and a bandage was applied to the wound. Explicit verbal and written wound care instructions were provided. The patient left the Dermatology Clinic in good condition.    Photograph was obtained for clinical monitoring and inclusion in medical record.    4. Neoplasm of uncertain behavior on the left clavicle. The differential diagnosis includes BCC vs fibrous papule vs Other.     Punch biopsy:  After discussion of benefits and risks including but not limited to bleeding/bruising, pain/swelling, infection, scar, incomplete removal, nerve damage/numbness, recurrence, and non-diagnostic biopsy, written consent, verbal consent and photographs were obtained. Time-out was performed. The area was cleaned with isopropyl alcohol. 0.5mL of 1% lidocaine with 1:100,000 epinephrine was injected to obtain adequate anesthesia of the lesion on the left clavicle.  A 2 mm punch biopsy was performed. Gel foam was utilized to approximate the epidermal edges. White petroleum jelly/Vaseline and a bandage was applied to the wound. Explicit verbal and written wound care instructions were provided. The patient left the Dermatology Clinic in good condition.     Photograph was obtained for clinical monitoring and inclusion in medical record.    CC Dr. Ni on close of this encounter.  Follow-up in 6 months, earlier for new or changing lesions.       Staff Involved:  Staff/Scribe    Scribe Disclosure:  I, Fidel Cruz, am serving as a scribe to document services personally performed by Kirsty Saavedra PA-C, based on data collection and the provider's statements to me.     Provider Disclosure:   The  documentation recorded by the scribe accurately reflects the services I personally performed and the decisions made by me.    All risks, benefits and alternatives were discussed with patient.  Patient is in agreement and understands the assessment and plan.  All questions were answered.    Kirsty Saavedra PA-C  [facility]: Phone: [phone], Fax: [phone]

## 2019-08-06 NOTE — NURSING NOTE
Dermatology Rooming Note    Onofre Robertson's goals for this visit include:   Chief Complaint   Patient presents with     Skin Check     Onofre is here today for a recheck on her nose from where she had surgery.      JG Pineda

## 2019-08-06 NOTE — LETTER
8/6/2019       RE: Onofre Robertson  [address]  [place] [address]     Dear Colleague,    Thank you for referring your patient, Onofre Robertson, to the [facility] DERMATOLOGY at [facility]. Please see a copy of my visit note below.    [facility] Dermatology Note      Dermatology Problem List:  1. History of NMSC  -BCC on the left side of the nose, s/p MMS 12/6/2018  2. NUB on the left clavicle - s/p biopsy DDx: BCC vs fibrous papule vs Other 8/6/2019  3. NUB under the right nostril - s/p biopsy DDx: BCC vs fibrous papule vs Other 8/6/2019    Encounter Date: Aug 6, 2019    CC:  Chief Complaint   Patient presents with     Skin Check     Onofre is here today for a recheck on her nose from where she had surgery.          History of Present Illness:  Ms. Onofre Robertson is a 59 year old female who is a return patient to the clinic and presents for a skin check. The patient was last seen on 12/06/2018 by Dr. Dunn when MMS was performed on a BCC on the left side of the nose. States the area is now well healed and she is happy with the outcome.    At today's visit, the patient notes she has previously had a full body skin check at an outside provider one year ago. She notes lesions of concern on the right side of her nose that has been present for a couple of months. She states it has decreased in size over time but it has not receded. Does not bleed or itch. The patient denies painful, itching, tingling or bleeding lesions unless otherwise noted.    She now lives in [place], but spends summers in [place].     Past Medical History:   Patient Active Problem List   Diagnosis     Tobacco use disorder     Obesity     Hypothyroidism     Hyperlipidemia LDL goal <160     Osteoarthrosis involving multiple sites     Family history of malignant neoplasm of breast     Liver hemangioma     Esophageal reflux     Abdominal pain, generalized     IFG (impaired fasting  glucose)     Lower back pain     Morbid obesity (H)     Past Medical History:   Diagnosis Date     Arthritis      Esophageal reflux      H pylori ulcer      Hypercholesterolemia      Hypothyroidism      Lyme disease      Thyroid cancer      Past Surgical History:   Procedure Laterality Date      SECTION       COLONOSCOPY       CYSTOSCOPY N/A 2015    Procedure: CYSTOSCOPY;  Surgeon: Denisse Luong DO;  Location: [facility] OR     DAVINCI HYSTERECTOMY TOTAL, BILATERAL SALPINGO-OOPHORECTOMY, COMBINED Bilateral 2015    Procedure: COMBINED DAVINCI HYSTERECTOMY TOTAL, SALPINGO-OOPHORECTOMY;  Surgeon: Denisse Luong DO;  Location: [facility] OR     HYSTERECTOMY       LEFT BREAST LUMPECTOMY       LIGATE FALLOPIAN TUBE NOS       ORTHOPEDIC SURGERY       RHINOPLASTY       THYROIDECTOMY       TONSILLECTOMY         Social History:   reports that she quit smoking about 8 years ago. Her smoking use included hookah. She has a 22.50 pack-year smoking history. She uses smokeless tobacco. She reports that she drinks alcohol. She reports that she does not use drugs.    Family History:  Family History   Problem Relation Age of Onset     Heart Disease Father          59 YO MI X4,COLITIS,DM,OBESITY     Hyperlipidemia Father      Gastrointestinal Disease Mother         78 YO STOMACH ULCERS,QUIT DRINKING 12 YEARS AGO     Breast Cancer Mother      Thyroid Disease Mother      Other Cancer Mother      Neurologic Disorder Sister          19 YO EPILEPSY       Medications:  Current Outpatient Medications   Medication Sig Dispense Refill     levothyroxine (SYNTHROID) 137 MCG tablet Take 150 mcg by mouth daily        lidocaine (LIDODERM) 5 % patch APPLY 1 PATCH ONTO SKIN EVERY DAY 30 patch 3     omeprazole (PRILOSEC) 40 MG capsule Take 1 capsule (40 mg) by mouth daily 90 capsule 3       Allergies   Allergen Reactions     Contrast Dye      respiratory     Morphine Anaphylaxis     Penicillins Anaphylaxis      "\"ANAPHYLACTIC\" ER     Topamax [Topiramate] Other (See Comments)     Stomach burning and rapid heart rate       Celexa [Citalopram] Itching, Swelling and Rash     Tongue and face numbness, couldn't taste right,  tongue swelling and itchy rash     Prozac [Fluoxetine] Itching, Swelling and Rash     Tongue and face numbness, couldn't taste right, tongue swelling and itchy rash       Review of Systems:  -Constitutional: The patient denies fatigue, fevers, chills, unintended weight loss, and night sweats.  -HEENT: Patient denies nonhealing oral sores.  -Skin: As above in HPI. No additional skin concerns.  -Heme/Lymph: no concerning bumps, no bleeding or bruising problems     Physical exam:  Vitals: [facility] 02/20/2015   GEN: This is a well developed, well-nourished female in no acute distress, in a pleasant mood.    SKIN: Total skin excluding the undergarment areas was performed. The exam included the head/face, neck, both arms, chest, back, abdomen, both legs, digits and/or nails.   -1-2mm pink papule under the right nostril  -3-4mm shiny pink papule on the left clavicle  -Well healed, barely visible scar on the left side of the nose, NERD  -No other lesions of concern on areas examined.       Impression/Plan:  1. History of NMSC - BCC of left side of nose s/p [facility] 12/06/2018 - NERD    No evidence of recurrence    ABCDs of melanoma were discussed and self skin checks were advised.    Sun precaution was advised including the use of sun screens of SPF 30 or higher, sun protective clothing, and avoidance of tanning beds.    Patient to return for bi annual skin exams - lives in [place], so she comes every 8mo or so.    2. Skin cancer screening    ABCDs of melanoma were discussed and self skin checks were advised.     Sun precaution was advised including the use of sun screens of SPF 30 or higher, sun protective clothing, and avoidance of tanning beds.    3. Neoplasm of uncertain behavior on the right nostril. The differential diagnosis " includes BCC vs fibrous papule vs Other.     Punch biopsy:  After discussion of benefits and risks including but not limited to bleeding/bruising, pain/swelling, infection, scar, incomplete removal, nerve damage/numbness, recurrence, and non-diagnostic biopsy, written consent, verbal consent and photographs were obtained. Time-out was performed. The area was cleaned with isopropyl alcohol. 0.5mL of 1% lidocaine with 1:100,000 epinephrine was injected to obtain adequate anesthesia of the lesion on the right nostril.  A 2 mm punch biopsy was performed. Gel foam was utilized to approximate the epidermal edges. White petroleum jelly/Vaseline and a bandage was applied to the wound. Explicit verbal and written wound care instructions were provided. The patient left the Dermatology Clinic in good condition.    Photograph was obtained for clinical monitoring and inclusion in medical record.    4. Neoplasm of uncertain behavior on the left clavicle. The differential diagnosis includes BCC vs fibrous papule vs Other.     Punch biopsy:  After discussion of benefits and risks including but not limited to bleeding/bruising, pain/swelling, infection, scar, incomplete removal, nerve damage/numbness, recurrence, and non-diagnostic biopsy, written consent, verbal consent and photographs were obtained. Time-out was performed. The area was cleaned with isopropyl alcohol. 0.5mL of 1% lidocaine with 1:100,000 epinephrine was injected to obtain adequate anesthesia of the lesion on the left clavicle.  A 2 mm punch biopsy was performed. Gel foam was utilized to approximate the epidermal edges. White petroleum jelly/Vaseline and a bandage was applied to the wound. Explicit verbal and written wound care instructions were provided. The patient left the Dermatology Clinic in good condition.     Photograph was obtained for clinical monitoring and inclusion in medical record.    CC Dr. Ni on close of this encounter.  Follow-up in 6 months,  earlier for new or changing lesions.       Staff Involved:  Staff/Scribe    Scribe Disclosure:  I, Fidel Cruz, am serving as a scribe to document services personally performed by Kirsty Saavedra PA-C, based on data collection and the provider's statements to me.     Provider Disclosure:   The documentation recorded by the scribe accurately reflects the services I personally performed and the decisions made by me.    All risks, benefits and alternatives were discussed with patient.  Patient is in agreement and understands the assessment and plan.  All questions were answered.    Kirsty Saavedra PA-C  [facility] [place]: Phone: [phone], Fax: [phone]                            Pictures were placed in Pt's chart today for future reference.

## 2019-08-08 ENCOUNTER — HOSPITAL ENCOUNTER (OUTPATIENT)
Dept: MAMMOGRAPHY | Facility: CLINIC | Age: 59
Discharge: HOME | End: 2019-08-08
Attending: INTERNAL MEDICINE | Admitting: INTERNAL MEDICINE
Payer: COMMERCIAL

## 2019-08-08 DIAGNOSIS — Z12.31 VISIT FOR SCREENING MAMMOGRAM: ICD-10-CM

## 2019-08-08 LAB — COPATH REPORT: NORMAL

## 2019-08-08 PROCEDURE — 77063 BREAST TOMOSYNTHESIS BI: CPT

## 2019-09-30 ENCOUNTER — HEALTH MAINTENANCE LETTER (OUTPATIENT)
Age: 59
End: 2019-09-30

## 2019-10-07 ENCOUNTER — DOCUMENTATION ONLY (OUTPATIENT)
Dept: OTHER | Facility: CLINIC | Age: 59
End: 2019-10-07

## 2019-11-05 ENCOUNTER — TELEPHONE (OUTPATIENT)
Dept: URGENT CARE | Facility: URGENT CARE | Age: 59
End: 2019-11-05

## 2019-11-05 ENCOUNTER — OFFICE VISIT (OUTPATIENT)
Dept: URGENT CARE | Facility: URGENT CARE | Age: 59
End: 2019-11-05
Payer: COMMERCIAL

## 2019-11-05 ENCOUNTER — ANCILLARY PROCEDURE (OUTPATIENT)
Dept: GENERAL RADIOLOGY | Facility: CLINIC | Age: 59
End: 2019-11-05
Attending: PHYSICIAN ASSISTANT
Payer: COMMERCIAL

## 2019-11-05 VITALS
OXYGEN SATURATION: 97 % | BODY MASS INDEX: 43.65 KG/M2 | WEIGHT: 243 LBS | DIASTOLIC BLOOD PRESSURE: 90 MMHG | TEMPERATURE: 97.9 F | SYSTOLIC BLOOD PRESSURE: 140 MMHG | RESPIRATION RATE: 16 BRPM | HEART RATE: 68 BPM

## 2019-11-05 DIAGNOSIS — R05.8 PRODUCTIVE COUGH: ICD-10-CM

## 2019-11-05 DIAGNOSIS — R05.8 PRODUCTIVE COUGH: Primary | ICD-10-CM

## 2019-11-05 PROCEDURE — 71046 X-RAY EXAM CHEST 2 VIEWS: CPT

## 2019-11-05 PROCEDURE — 99214 OFFICE O/P EST MOD 30 MIN: CPT | Performed by: PHYSICIAN ASSISTANT

## 2019-11-05 RX ORDER — AZITHROMYCIN 250 MG/1
TABLET, FILM COATED ORAL
Qty: 6 TABLET | Refills: 0 | Status: SHIPPED | OUTPATIENT
Start: 2019-11-05 | End: 2020-04-22

## 2019-11-05 RX ORDER — PREDNISONE 10 MG/1
10 TABLET ORAL DAILY
Qty: 5 TABLET | Refills: 0 | Status: SHIPPED | OUTPATIENT
Start: 2019-11-05 | End: 2020-04-22

## 2019-11-05 NOTE — PATIENT INSTRUCTIONS
(R05) Productive cough  (primary encounter diagnosis)  Comment:   Plan: XR Chest 2 Views, predniSONE (DELTASONE) 10 MG         tablet, azithromycin (ZITHROMAX Z-THERESE) 250 MG         tablet,     Advised to stop vaping at minimum until illness has completely resolved.            F/U with PCP for re-check within one week, sooner should symptoms persist or worsen.      Patient expresses understanding and agreement with the assessment and plan as above.

## 2019-11-05 NOTE — TELEPHONE ENCOUNTER
Reason for call:  Other   Patient called regarding (reason for call): call back  Additional comments: xray results    Phone number to reach patient:  Cell number on file:    Telephone Information:   Mobile [phone]       Best Time:  any    Can we leave a detailed message on this number?  YES

## 2019-11-05 NOTE — PROGRESS NOTES
"Patient presents with:  URI: Pt is coughing up green mucus X 3 weeks    SUBJECTIVE:   Onofer Robertson is a 59 year old female presenting with a chief complaint of   1) Productive cough for the past 3 weeks. Worsening.    2) wheezing.     Was seen at walk in clinic and given nebulizer treatments and an albuterol inhaler.      Complains of some chest discomfort with deep breathing and cough.      No fevers.      Onset of symptoms was as above.  Course of illness is worsening.    Severity moderate  Current and Associated symptoms: as above  Treatment measures tried include as above.  Predisposing factors include cold induced reactive airway.    SH: lives in [place] during the winter.      Last vaped today.  \"Hookah pipe\".  Nicotine drops.      Past Medical History:   Diagnosis Date     Arthritis      Esophageal reflux      H pylori ulcer      Hypercholesterolemia      Hypothyroidism      Lyme disease      Thyroid cancer      Patient Active Problem List   Diagnosis     Tobacco use disorder     Obesity     Hypothyroidism     Hyperlipidemia LDL goal <160     Osteoarthrosis involving multiple sites     Family history of malignant neoplasm of breast     Liver hemangioma     Esophageal reflux     Abdominal pain, generalized     IFG (impaired fasting glucose)     Lower back pain     Morbid obesity (H)     Social History     Tobacco Use     Smoking status: Former Smoker     Packs/day: 0.50     Years: 45.00     Pack years: 22.50     Types: Hookah     Last attempt to quit: 1/15/2011     Years since quittin.8     Smokeless tobacco: Current User     Tobacco comment: e-cigarettes    Substance Use Topics     Alcohol use: Yes     Alcohol/week: 0.0 standard drinks     Comment: 6 PER WEEK       ROS:  CONSTITUTIONAL:NEGATIVE for fever, chills, change in weight  INTEGUMENTARY/SKIN: NEGATIVE for worrisome rashes, moles or lesions  EYES: NEGATIVE for vision changes or irritation  ENT/MOUTH: as per HPI  RESP:as per HPI  CV: NEGATIVE " for chest pain, palpitations or peripheral edema  GI: NEGATIVE for nausea, abdominal pain, heartburn, or change in bowel habits  : negative  MUSCULOSKELETAL: NEGATIVE for significant arthralgias or myalgia  NEURO: NEGATIVE for weakness, dizziness or paresthesias  Review of systems negative except as stated above.    OBJECTIVE  :BP (!) 140/90 (BP Location: Left arm, Patient Position: Sitting, Cuff Size: Adult Regular)   Pulse 68   Temp 97.9  F (36.6  C) (Oral)   Resp 16   Wt 110.2 kg (243 lb)   LMP 02/20/2015   SpO2 97%   BMI 43.65 kg/m    GENERAL APPEARANCE: healthy, alert and no distress  EYES: EOMI,  PERRL, conjunctiva clear  HENT: ear canals and TM's normal.  Nose and mouth without ulcers, erythema or lesions  HENT: nasal turbinates boggy with bluish hue and rhinorrhea yellow  NECK: supple, nontender, no lymphadenopathy  RESP: a few scattered rhonchi  CV: regular rates and rhythm, normal S1 S2, no murmur noted  ABDOMEN:  soft, nontender, no HSM or masses and bowel sounds normal  NEURO: Normal strength and tone, sensory exam grossly normal,  normal speech and mentation  SKIN: no suspicious lesions or rashes    CXR: no infiltrates or masses as per my interpretation during clinic visit.      (R05) Productive cough  (primary encounter diagnosis)  Comment:   Plan: XR Chest 2 Views, predniSONE (DELTASONE) 10 MG         tablet, azithromycin (ZITHROMAX Z-THERESE) 250 MG         tablet,     Advised to stop vaping at minimum until illness has completely resolved.            F/U with PCP for re-check within one week, sooner should symptoms persist or worsen.      Patient expresses understanding and agreement with the assessment and plan as above.

## 2020-04-14 ENCOUNTER — TELEPHONE (OUTPATIENT)
Dept: DERMATOLOGY | Facility: CLINIC | Age: 60
End: 2020-04-14

## 2020-04-14 NOTE — TELEPHONE ENCOUNTER
"Called pt and let her know that we would like to change her 4/30/20 appointment to a virtual visit. Pt mentioned that she has a non-helaing spot that is painful and \"crusty\". I asked for the pt to send a [employer] message to have Kirsty triage it. Pt understood and will send photos in the next couple of days.  Antonia Currie, RMALEXANDRE     "

## 2020-04-22 ENCOUNTER — VIRTUAL VISIT (OUTPATIENT)
Dept: FAMILY MEDICINE | Facility: CLINIC | Age: 60
End: 2020-04-22
Payer: COMMERCIAL

## 2020-04-22 DIAGNOSIS — K21.9 GASTROESOPHAGEAL REFLUX DISEASE WITHOUT ESOPHAGITIS: ICD-10-CM

## 2020-04-22 DIAGNOSIS — M54.50 CHRONIC MIDLINE LOW BACK PAIN WITHOUT SCIATICA: ICD-10-CM

## 2020-04-22 DIAGNOSIS — Z20.828 CONTACT WITH OR EXPOSURE TO VIRAL DISEASE: Primary | ICD-10-CM

## 2020-04-22 DIAGNOSIS — G89.29 CHRONIC MIDLINE LOW BACK PAIN WITHOUT SCIATICA: ICD-10-CM

## 2020-04-22 PROCEDURE — 99213 OFFICE O/P EST LOW 20 MIN: CPT | Mod: TEL | Performed by: INTERNAL MEDICINE

## 2020-04-22 RX ORDER — LIDOCAINE 50 MG/G
PATCH TOPICAL
Qty: 30 PATCH | Refills: 3 | Status: SHIPPED | OUTPATIENT
Start: 2020-04-22 | End: 2021-04-26

## 2020-04-22 RX ORDER — OMEPRAZOLE 40 MG/1
40 CAPSULE, DELAYED RELEASE ORAL DAILY
Qty: 90 CAPSULE | Refills: 3 | Status: SHIPPED | OUTPATIENT
Start: 2020-04-22 | End: 2021-04-26

## 2020-04-22 RX ORDER — LEVOTHYROXINE SODIUM 112 UG/1
137 TABLET ORAL DAILY
COMMUNITY
End: 2024-08-21

## 2020-04-22 NOTE — PROGRESS NOTES
"Looks like through our office starting next week cayden Robertson is a 60 year old female who is being evaluated via a billable telephone visit.      The patient has been notified of following:     \"This telephone visit will be conducted via a call between you and your physician/provider. We have found that certain health care needs can be provided without the need for a physical exam.  This service lets us provide the care you need with a short phone conversation.  If a prescription is necessary we can send it directly to your pharmacy.  If lab work is needed we can place an order for that and you can then stop by our lab to have the test done at a later time.    Telephone visits are billed at different rates depending on your insurance coverage. During this emergency period, for some insurers they may be billed the same as an in-person visit.  Please reach out to your insurance provider with any questions.    If during the course of the call the physician/provider feels a telephone visit is not appropriate, you will not be charged for this service.\"    Patient has given verbal consent for Telephone visit?  Yes    How would you like to obtain your AVS? MyChart    Subjective     Cayden Robertson is a 60 year old female who presents to clinic today for the following health issues:    HPI       Contact with or exposure to viral disease   I spoke with Cayden over the phone today with regards to her recent illness.  She did have a previous 1 week history of a dry cough with a slight tickle in the back of her throat accompanied by feeling of fatigue and malaise.  She notably has been living in a small community in [place] for the past few months and there has been a known outbreak of COVID-19 within this community.  She has had some exposure to an individual who with known COVID-19 prior to her developing her symptoms.  She is curious if she may have been exposed.  Gastroesophageal reflux disease without " esophagitis  Chronic midline low back pain without sciatica     Patient Active Problem List   Diagnosis     Tobacco use disorder     Obesity     Hypothyroidism     Hyperlipidemia LDL goal <160     Osteoarthrosis involving multiple sites     Family history of malignant neoplasm of breast     Liver hemangioma     Esophageal reflux     Abdominal pain, generalized     IFG (impaired fasting glucose)     Lower back pain     Morbid obesity (H)     Past Surgical History:   Procedure Laterality Date      SECTION       COLONOSCOPY       CYSTOSCOPY N/A 2015    Procedure: CYSTOSCOPY;  Surgeon: Denisse Luong DO;  Location: [facility]     DAVINCI HYSTERECTOMY TOTAL, BILATERAL SALPINGO-OOPHORECTOMY, COMBINED Bilateral 2015    Procedure: COMBINED DAVINCI HYSTERECTOMY TOTAL, SALPINGO-OOPHORECTOMY;  Surgeon: Denisse Luong DO;  Location: [facility] OR     HYSTERECTOMY       LEFT BREAST LUMPECTOMY       LIGATE FALLOPIAN TUBE NOS       ORTHOPEDIC SURGERY       RHINOPLASTY       THYROIDECTOMY       TONSILLECTOMY         Social History     Tobacco Use     Smoking status: Former Smoker     Packs/day: 0.50     Years: 45.00     Pack years: 22.50     Types: Hookah     Last attempt to quit: 1/15/2011     Years since quittin.2     Smokeless tobacco: Current User     Tobacco comment: e-cigarettes    Substance Use Topics     Alcohol use: Yes     Alcohol/week: 0.0 standard drinks     Comment: 6 PER WEEK     Family History   Problem Relation Age of Onset     Heart Disease Father          61 YO MI X4,COLITIS,DM,OBESITY     Hyperlipidemia Father      Gastrointestinal Disease Mother         78 YO STOMACH ULCERS,QUIT DRINKING 12 YEARS AGO     Breast Cancer Mother      Thyroid Disease Mother      Other Cancer Mother      Neurologic Disorder Sister          21 YO EPILEPSY         Current Outpatient Medications   Medication Sig Dispense Refill     levothyroxine (SYNTHROID/LEVOTHROID) 112 MCG tablet Take 112 mcg by  "mouth daily       lidocaine (LIDODERM) 5 % patch APPLY 1 PATCH ONTO SKIN EVERY DAY 30 patch 3     omeprazole (PRILOSEC) 40 MG DR capsule Take 1 capsule (40 mg) by mouth daily 90 capsule 3     Allergies   Allergen Reactions     Contrast Dye      respiratory     Morphine Anaphylaxis     Penicillins Anaphylaxis     \"ANAPHYLACTIC\" ER     Topamax [Topiramate] Other (See Comments)     Stomach burning and rapid heart rate       Celexa [Citalopram] Itching, Swelling and Rash     Tongue and face numbness, couldn't taste right,  tongue swelling and itchy rash     Prozac [Fluoxetine] Itching, Swelling and Rash     Tongue and face numbness, couldn't taste right, tongue swelling and itchy rash     Recent Labs   Lab Test 09/17/18  0448 02/14/18  1523 09/12/17  1812 09/23/16  1612 09/12/16  0838  10/08/14  0847   A1C  --  5.7  --   --   --   --   --    LDL  --   --  126*  --  121*  --  109   HDL  --   --  55  --  52  --  45*   TRIG  --   --  184*  --  245*  --  196*   ALT 28 25  --  41  --    < > 38   CR 0.83 0.84  --  0.74  --    < > 0.78   GFRESTIMATED 70 70  --  81  --    < > 77   GFRESTBLACK 85 84  --  >90   GFR Calc    --    < > >90   GFR Calc     POTASSIUM 4.0 4.3  --  4.1  --    < > 4.0   TSH  --   --   --   --  0.08*  --  0.13*    < > = values in this interval not displayed.      BP Readings from Last 3 Encounters:   11/05/19 (!) 140/90   08/05/19 127/69   12/06/18 140/64    Wt Readings from Last 3 Encounters:   11/05/19 110.2 kg (243 lb)   08/05/19 110.2 kg (243 lb)   09/17/18 109 kg (240 lb 4.8 oz)                    Reviewed and updated as needed this visit by Provider         Review of Systems   ROS COMP: Constitutional, HEENT, cardiovascular, pulmonary, GI, , musculoskeletal, neuro, skin, endocrine and psych systems are negative, except as otherwise noted.       Objective   Reported vitals:  LMP 02/20/2015    healthy, alert and no distress  PSYCH: Alert and oriented times 3; coherent " speech, normal   rate and volume, able to articulate logical thoughts, able   to abstract reason, no tangential thoughts, no hallucinations   or delusions  Her affect is normal  RESP: No cough, no audible wheezing, able to talk in full sentences  Remainder of exam unable to be completed due to telephone visits    Diagnostic Test Results:  Labs reviewed in Epic        Assessment/Plan:  1. Contact with or exposure to viral disease  We will try to order the COVID-19 serology test today.  This is been ordered through our lab and I think will be available when she returns to [place] on Monday.  - COVID-19 Virus (Coronavirus) Antibody; Future    2. Gastroesophageal reflux disease without esophagitis  We will also refill her omeprazole and her current dose  - omeprazole (PRILOSEC) 40 MG DR capsule; Take 1 capsule (40 mg) by mouth daily  Dispense: 90 capsule; Refill: 3    3. Chronic midline low back pain without sciatica  In addition lidocaine patches were refilled.  - lidocaine (LIDODERM) 5 % patch; APPLY 1 PATCH ONTO SKIN EVERY DAY  Dispense: 30 patch; Refill: 3    No follow-ups on file.      Phone call duration:  15 minutes    Jacob Nicole MD, MD

## 2020-04-29 DIAGNOSIS — Z20.828 CONTACT WITH OR EXPOSURE TO VIRAL DISEASE: ICD-10-CM

## 2020-04-29 PROCEDURE — 36415 COLL VENOUS BLD VENIPUNCTURE: CPT | Performed by: INTERNAL MEDICINE

## 2020-04-29 PROCEDURE — 99000 SPECIMEN HANDLING OFFICE-LAB: CPT | Performed by: INTERNAL MEDICINE

## 2020-04-29 PROCEDURE — 86769 SARS-COV-2 COVID-19 ANTIBODY: CPT | Mod: 90 | Performed by: INTERNAL MEDICINE

## 2020-04-30 ENCOUNTER — OFFICE VISIT (OUTPATIENT)
Dept: DERMATOLOGY | Facility: CLINIC | Age: 60
End: 2020-04-30
Payer: COMMERCIAL

## 2020-04-30 DIAGNOSIS — D23.9 DERMATOFIBROMA: ICD-10-CM

## 2020-04-30 DIAGNOSIS — L57.0 ACTINIC KERATOSIS: Primary | ICD-10-CM

## 2020-04-30 ASSESSMENT — PAIN SCALES - GENERAL: PAINLEVEL: NO PAIN (0)

## 2020-04-30 NOTE — PROGRESS NOTES
[facility] Dermatology Note      Dermatology Problem List:  1. History of NMSC  -BCC on the left side of the nose, s/p MMS 2018  2. AKs  - s/p cryo  3. DF - right upper arm    CC:  Spot check      Encounter Date: 2020    History of Present Illness:  Ms. Onofre Robertson is a 60 year old female who with a history of skin cancer presents for a spot check. She has a lesion of concern on her right forearm as well as one on her left upper arm. She was last seen for a FBSE on 19 when two biopsies were performed. Both came back benign. She has a hx of BCC on the lest side of the nose which was removed via MMS in 2018. She lives in [place] during the winter and lives in [place] during the summer. She notes the two areas on her arms have been present a few months. They are pink and scaly. They have not grown and are not tender however they do not go away, even when she moisturizes. She also notes another bump on the right upper arm and says it has been there a while, and is unsure of what it is. She has no other medical concerns today. She is pending an COVID-19 antibody test right now.The patient denies painful, itching, tingling or bleeding lesions unless otherwise noted.       Past Medical History:   Patient Active Problem List   Diagnosis     Tobacco use disorder     Obesity     Hypothyroidism     Hyperlipidemia LDL goal <160     Osteoarthrosis involving multiple sites     Family history of malignant neoplasm of breast     Liver hemangioma     Esophageal reflux     Abdominal pain, generalized     IFG (impaired fasting glucose)     Lower back pain     Morbid obesity (H)     Past Medical History:   Diagnosis Date     Arthritis      Esophageal reflux      H pylori ulcer      Hypercholesterolemia      Hypothyroidism      Lyme disease      Thyroid cancer      Past Surgical History:   Procedure Laterality Date      SECTION       COLONOSCOPY       CYSTOSCOPY N/A 2015    Procedure:  "CYSTOSCOPY;  Surgeon: Denisse Luong DO;  Location: [facility]     DAVINCI HYSTERECTOMY TOTAL, BILATERAL SALPINGO-OOPHORECTOMY, COMBINED Bilateral 6/16/2015    Procedure: COMBINED DAVINCI HYSTERECTOMY TOTAL, SALPINGO-OOPHORECTOMY;  Surgeon: Denisse Luong DO;  Location: [facility]     HYSTERECTOMY       LEFT BREAST LUMPECTOMY       LIGATE FALLOPIAN TUBE NOS       ORTHOPEDIC SURGERY       RHINOPLASTY       THYROIDECTOMY       TONSILLECTOMY         Social History:  reports that she quit smoking about 8 years ago. Her smoking use included hookah. She has a 22.50 pack-year smoking history. She uses smokeless tobacco. She reports that she drinks alcohol. She reports that she does not use drugs.  She now lives in [place], but spends summers in [place].     Family History:  Not obtained today.    Medications:  Current Outpatient Medications   Medication Sig Dispense Refill     levothyroxine (SYNTHROID/LEVOTHROID) 112 MCG tablet Take 112 mcg by mouth daily       lidocaine (LIDODERM) 5 % patch APPLY 1 PATCH ONTO SKIN EVERY DAY 30 patch 3     omeprazole (PRILOSEC) 40 MG DR capsule Take 1 capsule (40 mg) by mouth daily 90 capsule 3     Allergies   Allergen Reactions     Contrast Dye      respiratory     Morphine Anaphylaxis     Penicillins Anaphylaxis     \"ANAPHYLACTIC\" ER     Topamax [Topiramate] Other (See Comments)     Stomach burning and rapid heart rate       Celexa [Citalopram] Itching, Swelling and Rash     Tongue and face numbness, couldn't taste right,  tongue swelling and itchy rash     Prozac [Fluoxetine] Itching, Swelling and Rash     Tongue and face numbness, couldn't taste right, tongue swelling and itchy rash         Review of Systems:  -Constitutional: The patient denies fatigue, fevers, chills, unintended weight loss, and night sweats.  -HEENT: Patient denies nonhealing oral sores.  -Skin: As above in HPI. No additional skin concerns.    Physical exam:  Vitals: LMP 02/20/2015   GEN: This is a well developed, " well-nourished female in no acute distress, in a pleasant mood.    SKIN: Sun-exposed skin, which includes the head/face, neck, both arms, digits, and/or nails was examined.   -There are erythematous macules with overyling adherent scale on the left elbow and right upper arm.   -There is a firm tan/flesh colored papule that dimples with lateral pressure on the right upper arm x1.  -No other lesions of concern on areas examined.     Impression/Plan:  1. Actinic keratosis  -Cryotherapy procedure note: After verbal consent and discussion of risks and benefits including but no limited to dyspigmentation/scar, blister, and pain, 2 was(were) treated with 1-2mm freeze border for 2 cycles with liquid nitrogen. Post cryotherapy instructions were provided.  -will monitor for changes, if recur plan for bx  -continue with sun protection    2. Dermatofibroma  -reassured benign  -edu on eitology      Plan for next FBSE in August 2020.    CC Dr. Benedict on close of this encounter.  Follow-up in 4 months, earlier for new or changing lesions.       Staff Involved:  Staff Only  All risks, benefits and alternatives were discussed with patient.  Patient is in agreement and understands the assessment and plan.  All questions were answered.    Kirsty Saavedra PA-C, MPAS  [facility] [place]: Phone: [phone], Fax: [phone]  [facility]: Phone: [phone],  Fax: [phone]

## 2020-04-30 NOTE — NURSING NOTE
Chief Complaint   Patient presents with     Derm Problem     Onofre is here today for a possible biopsy on both arms.     ESTEBAN BO on 4/30/2020 at 2:43 PM

## 2020-05-02 LAB
COVID-19 SPIKE RBD ABY TITER: NORMAL
COVID-19 SPIKE RBD ABY: NEGATIVE

## 2020-05-04 NOTE — RESULT ENCOUNTER NOTE
Osman Adhikari,    I have had the opportunity to review your recent results and an interpretation is as follows:  Your test for COVID-19 returned negative.  As above there may be false negatives, but this is rare and I would presume that you have not been exposed and are still at risk for COVID infection     Sincerely,  Jacob Nicole MD

## 2020-08-17 ENCOUNTER — VIRTUAL VISIT (OUTPATIENT)
Dept: FAMILY MEDICINE | Facility: OTHER | Age: 60
End: 2020-08-17

## 2020-08-17 ENCOUNTER — TELEPHONE (OUTPATIENT)
Dept: FAMILY MEDICINE | Facility: CLINIC | Age: 60
End: 2020-08-17

## 2020-08-17 NOTE — TELEPHONE ENCOUNTER
Returned call to patient.     Complaint of SINUS sx     Patient complains of sinus congestion, sinus pain/pressure, rhinorrhea and green nasal discharge  Fever: no, reports fever last week x 2 days---resolved.   Duration of symptoms: 7 day(s) ago    Plan:  Video Visit scheduled tomorrow morning.   Caller agrees with this plan/advise.     [facility] - [address] [place] [address] [address]  [phone] (home) [phone] (work)    General home care instruction:    Stay home from work, school, childcare or other public places until your fever (37.8 degrees Celsius [100 degrees Fahrenheit]) has been gone for at least 24 hours, except to seek medical care. (Fever should be gone without the use of fever-reducing medications.)     Cover your cough and wash your hands often, and especially after coughing, sneezing, blowing your nose.    Use OTC Robitussin or Mucinex DM for chest congestion and cough relief.    Drink plenty of fluids (such as water, broth, sports drinks, electrolyte beverages for children) to prevent dehydration.    Saline nasal spray.    Avoid tobacco and second hand smoke.    Get plenty of rest.      Use over-the-counter pain relievers as needed per  instructions.    Do not give aspirin (acetylsalicylic acid) or products that contain aspirin (e.g. bismuth subsalicylate - Pepto Bismol) to children or teenagers 18 years or younger.    Children younger than 4 years of age should not be given over-the-counter cold medications.    You may continue to shed virus after your fever is gone. Limit your contact with high-risk individuals for 10 days after your symptoms started and be especially careful to cover your coughs/sneezes and wash your hands.    Utilize [email or web address] for worsening symptoms.      Report any worsening symptoms.       Jaja Stapleton RN, BSN

## 2020-08-17 NOTE — TELEPHONE ENCOUNTER
Reason for Call:  Medication or medication refill: Rx request     Do you use a [facility]?  Name of the pharmacy and phone number for the current request:        [id number] - [place], [place] - [address] AT [facility] [address]      Name of the medication requested: Pt is requesting a z pack be sent to pharmacy     Other request: Please call pt to confirm.    Can we leave a detailed message on this number? No    Phone number patient can be reached at: Home number on file [phone] (home)    Best Time: Anytime     Call taken on 8/17/2020 at 11:54 AM by Anastacia Perez  ;

## 2020-08-18 NOTE — PROGRESS NOTES
"Date: 2020 13:52:49  Clinician: Ginna Claudio  Clinician NPI: [id number]  Patient: Marcelo Adhikari  Patient : 1960  Patient Address: [address] [place] [address]  Patient Phone: [phone]  Visit Protocol: URI  Patient Summary:  Marcelo is a 60 year old ( : 1960 ) female who initiated a Visit for cold, sinus infection, or influenza. When asked the question \"Please sign me up to receive news, health information and promotions. \", Marcelo responded \"No\".    Marcelo states her symptoms started suddenly 7-9 days ago.   Her symptoms consist of enlarged lymph nodes, tooth pain, a cough, nasal congestion, rhinitis, facial pain or pressure, and wheezing.   Symptom details     Nasal secretions: The color of her mucus is yellow.    Cough: Marcelo coughs a few times an hour and her cough is not more bothersome at night. Phlegm comes into her throat when she coughs. She believes her cough is caused by post-nasal drip. The color of the phlegm is white.     Wheezing: Marcelo has not ever been diagnosed with asthma. Additional wheezing details as reported by the patient (free text): Just a rattle       Facial pain or pressure: The facial pain or pressure feels worse when bending over or leaning forward.     Tooth pain: The tooth pain is not caused by a cavity, recent dental work, or other mouth problems.      Marcelo denies having ear pain, headache, chills, nausea, sore throat, ageusia, diarrhea, vomiting, myalgias, anosmia, fever, and malaise. She also denies having a sinus infection within the past year, taking antibiotic medication in the past month, double sickening (worsening symptoms after initial improvement), and having recent facial or sinus surgery in the past 60 days. She is not experiencing dyspnea.   Precipitating events  She has not recently been exposed to someone with influenza. Marcelo has not been in close contact with any high risk individuals.   Pertinent " COVID-19 (Coronavirus) information  In the past 14 days, Marcelo has not worked in a congregate living setting.   She does not work or volunteer as healthcare worker or a  and does not work or volunteer in a healthcare facility.   Marcelo also has not lived in a congregate living setting in the past 14 days. She does not live with a healthcare worker.   Marcelo has not had a close contact with a laboratory-confirmed COVID-19 patient within 14 days of symptom onset.   Since December 2019, Marcelo and has not had upper respiratory infection or influenza-like illness. Has not been diagnosed with lab-confirmed COVID-19 test   Pertinent medical history  Marcelo does not get yeast infections when she takes antibiotics.   Marceol does not need a return to work/school note.   Weight: 240 lbs   Marcelo smokes or uses smokeless tobacco.   Weight: 240 lbs    MEDICATIONS: Synthroid oral, ALLERGIES: Penicillins  Clinician Response:  Dear Marcelo,  Based on the information provided, you have acute bacterial sinusitis, also known as a sinus infection. Sinus infections are caused by bacteria or a virus and symptoms are almost always identical. The difference between the 2 types of infections is timing.  Sinus infections start as viral infections and symptoms improve on their own in about 7 days. If symptoms have not improved after 7 days or have even worsened, a bacterial infection may have developed.  Medication information  I am prescribing:       Fluticasone 50 mcg/actuation nasal spray. Inhale 2 sprays in each nostril 1 time per day; after 1 week, may adjust to 1 - 2 sprays in each nostril 1 time per day. This medication takes several days to start working, so keep taking it even if it doesn't help right away. There are no refills with this prescription.      Doxycycline hyclate 100 mg oral tablet. Take 1 tablet by mouth 2 times per day for 7 days. There are no refills with this prescription.       Ventolin HFA 90 mcg/actuation aerosol inhaler. Inhale 2 puffs every 4-6 hours as needed for 5 days. There are no refills with this prescription.     Certain antibiotics such as Doxycycline, levofloxacin, and moxifloxacin can cause your skin to be more sensitive to the sun. Exposure to the sun when taking these antibiotics may cause skin rash, itching, redness, or a severe sunburn. Be sure to avoid prolonged or direct exposure to the sun, especially between 10 am and 3 pm, if possible. Wear protective clothing and use sunscreen of SPF 30 or higher when you are outdoors.  Yeast infections can be a common side effect of antibiotics. The most common symptom of a yeast infection is itchiness in and around the vagina. Other signs and symptoms include burning, redness, or a thick, white vaginal discharge that looks like cottage cheese and does not have a bad smell.  Self care  Steps you can take to be as comfortable as possible:     Rest.    Drink plenty of fluids.    Take a warm shower to loosen congestion    Use a cool-mist humidifier.    Take a spoonful of honey to reduce your cough.     Also, as your provider, I need you to know that becoming tobacco-free is the most important thing you can do to protect your current and future health.  When to seek care  Please be seen in a clinic or urgent care if any of the following occur:     New symptoms develop, or symptoms become worse    Symptoms do not start to improve after 3 days of treatment     Call ahead before going to the clinic or urgent care.  It is possible to have an allergic reaction to an antibiotic even if you have not had one in the past. If you notice a new rash, significant swelling, or difficulty breathing, stop taking this medication immediately and go to a clinic or urgent care.  Additional treatment plan   Your symptoms show that you may have coronavirus (COVID-19). This illness can cause fever, cough and trouble breathing. Many people get a mild case  "and get better on their own. Some people can get very sick.  Based on the symptoms you have shared, I would like you to be re-checked in 2 to 3 days. Please call your family clinic to set up a video or phone visit.  Will I be tested for COVID-19?  We would like to test you for this virus.   Please call [phone] to schedule your visit. Explain that you were referred by [facility] to have a COVID-19 test. Be ready to share your [facility] visit ID number.   The following will serve as your written order for this COVID Test, ordered by me, for the indication of suspected COVID [Z20.828]: The test will be ordered in [employer], our electronic health record, after you are scheduled. It will show as ordered and authorized by Raz Michael MD.  Order: COVID-19 (Coronavirus) PCR for SYMPTOMATIC testing from [facility].  1.When it's time for your COVID test:   Stay at least 6 feet away from others. (If someone will drive you to your test, stay in the backseat, as far away from the  as you can.)   Cover your mouth and nose with a mask, tissue or washcloth.  Go straight to the testing site. Don't make any stops on the way there or back.      2.Starting now: Stay home and away from others (self-isolate) until:   You've had no fever---and no medicine that reduces fever---for one full day (24 hours). And...   Your other symptoms have gotten better. For example, your cough or breathing has improved. And...   At least 10 days have passed since your symptoms started.       During this time, don't leave the house except for testing or medical care.   Stay in your own room, even for meals. Use your own bathroom if you can.   Stay away from others in your home. No hugging, kissing or shaking hands. No visitors.  Don't go to work, school or anywhere else.    Clean \"high touch\" surfaces often (doorknobs, counters, handles, etc.). Use a household cleaning spray or wipes. You'll find a full list of  on the EPA website: " www.epa.gov/pesticide-registration/list-n-disinfectants-use-against-sars-cov-2.   Cover your mouth and nose with a mask, tissue or washcloth to avoid spreading germs.  Wash your hands and face often. Use soap and water.  Caregivers in these groups are at risk for severe illness due to COVID-19:  o People 65 years and older  o People who live in a nursing home or long-term care facility  o People with chronic disease (lung, heart, cancer, diabetes, kidney, liver, immunologic)   o People who have a weakened immune system, including those who:   Are in cancer treatment  Take medicine that weakens the immune system, such as corticosteroids  Had a bone marrow or organ transplant  Have an immune deficiency  Have poorly controlled HIV or AIDS  Are obese (body mass index of 40 or higher)  Smoke regularly   o Caregivers should wear gloves while washing dishes, handling laundry and cleaning bedrooms and bathrooms.  o Use caution when washing and drying laundry: Don't shake dirty laundry, and use the warmest water setting that you can.  o For more tips, go to www.cdc.gov/coronavirus/2019-ncov/downloads/10Things.pdf.      How can I take care of myself?   Get lots of rest. Drink extra fluids (unless a doctor has told you not to)   Take Tylenol (acetaminophen) for fever or pain. If you have liver or kidney problems, ask your family doctor if it's okay to take Tylenol.   Adults can take either:    650 mg (two 325 mg pills) every 4 to 6 hours, or...   1,000 mg (two 500 mg pills) every 8 hours as needed.    Note: Don't take more than 3,000 mg in one day. Acetaminophen is found in many medicines (both prescribed and over-the-counter medicines). Read all labels to be sure you don't take too much.   For children, check the Tylenol bottle for the right dose. The dose is based on the child's age or weight.    If you have other health problems (like cancer, heart failure, an organ transplant or severe kidney disease): Call your specialty  clinic if you don't feel better in the next 2 days.       Know when to call 911. Emergency warning signs include:    Trouble breathing or shortness of breath Pain or pressure in the chest that doesn't go away Feeling confused like you haven't felt before, or not being able to wake up Bluish-colored lips or face  Where can I get more information?   [facility] -- About COVID-19: [email or web address]   CDC -- What to Do If You're Sick: www.cdc.gov/coronavirus/2019-ncov/about/steps-when-sick.html   CDC -- Ending Home Isolation: www.cdc.gov/coronavirus/2019-ncov/hcp/disposition-in-home-patients.html   CDC -- Caring for Someone: www.cdc.gov/coronavirus/2019-ncov/if-you-are-sick/care-for-someone.html   [facility] -- Interim Guidance for Hospital Discharge to Home: www.[facility].[place]./diseases/coronavirus/hcp/hospdischarge.pdf   [facility] clinical trials (COVID-19 research studies): [email or web address].[facility]-clinical-trials    Below are the COVID-19 hotlines at the [facility] ([facility]). Interpreters are available.    For health questions: Call 843-275-2849 or 1-773.423.1011 (7 a.m. to 7 p.m.) For questions about schools and childcare: Call 153-311-9225 or 1-236.545.5288 (7 a.m. to 7 p.m.)       Diagnosis: Wheezing  Diagnosis ICD: R06.2  Prescription: fluticasone 50 mcg/actuation nasal spray,suspension 1 120 spray aerosol with adapter (grams), 30 days supply. Inhale 2 sprays in each nostril 1 time per day; after 1 week, may adjust to 1 - 2 sprays in each nostril 1 time per day.. Refills: 0, Refill as needed: no, Allow substitutions: yes  Prescription: doxycycline hyclate 100 mg oral tablet 14 tablet, 7 days supply. Take 1 tablet by mouth 2 times per day for 7 days. Refills: 0, Refill as needed: no, Allow substitutions: yes  Prescription: Ventolin HFA 90 mcg/actuation inhalation HFA aerosol inhaler 1 200 inhalation canister, 5 days supply. Inhale 2 puffs every 4-6 hours as needed  for 5 days. Refills: 0, Refill as needed: no, Allow substitutions: yes  Pharmacy: [id number] - [phone] - [address],  [place] [address]

## 2020-08-19 ENCOUNTER — TELEPHONE (OUTPATIENT)
Dept: FAMILY MEDICINE | Facility: CLINIC | Age: 60
End: 2020-08-19

## 2020-08-19 DIAGNOSIS — J01.00 ACUTE NON-RECURRENT MAXILLARY SINUSITIS: Primary | ICD-10-CM

## 2020-08-19 RX ORDER — AZITHROMYCIN 250 MG/1
TABLET, FILM COATED ORAL
Qty: 6 TABLET | Refills: 0 | Status: SHIPPED | OUTPATIENT
Start: 2020-08-19 | End: 2021-04-26

## 2020-08-19 NOTE — TELEPHONE ENCOUNTER
Reason for Call:  Other prescription    Detailed comments:   Pt is calling because she was seen via virtual visit on 08/17/2020 via [email or web address]   She was diagnosed with a sinus infection.  She is allergic to penicillin, so she was given doxycycline.  Pt is experiencing stomach that isn t persistent right away, but after time she gets really nauseous.  She eats it with meals, but it gets worse and worse with time.  Pt is requesting a zpak as an alternative.      Phone Number Patient can be reached at:   [phone]    Best Time: Any     Can we leave a detailed message on this number? YES    Call taken on 8/19/2020 at 9:38 AM by Janey [place]

## 2020-08-19 NOTE — TELEPHONE ENCOUNTER
Yes, she has tolerated azithromycin in the past be okay to replace doxycycline with azithromycin for management of sinusitis

## 2020-08-19 NOTE — TELEPHONE ENCOUNTER
To PCP:     Pt is having poor reaction to doxycycline RX'd by [facility] for sinusitis     Abdominal pain/nausea after taking dose (takes with food)     Pt is requesting Zpack as alternative, can this be written?     Pharmacy is pended, if needed.     Thank you,  Edyta RHOADES RN

## 2021-01-15 ENCOUNTER — HEALTH MAINTENANCE LETTER (OUTPATIENT)
Age: 61
End: 2021-01-15

## 2021-04-26 ENCOUNTER — OFFICE VISIT (OUTPATIENT)
Dept: FAMILY MEDICINE | Facility: CLINIC | Age: 61
End: 2021-04-26
Payer: COMMERCIAL

## 2021-04-26 ENCOUNTER — OFFICE VISIT (OUTPATIENT)
Dept: DERMATOLOGY | Facility: CLINIC | Age: 61
End: 2021-04-26
Payer: COMMERCIAL

## 2021-04-26 VITALS
HEIGHT: 63 IN | BODY MASS INDEX: 41.11 KG/M2 | SYSTOLIC BLOOD PRESSURE: 123 MMHG | OXYGEN SATURATION: 98 % | DIASTOLIC BLOOD PRESSURE: 75 MMHG | WEIGHT: 232 LBS | HEART RATE: 71 BPM | TEMPERATURE: 97.7 F

## 2021-04-26 DIAGNOSIS — G47.00 INSOMNIA, UNSPECIFIED TYPE: ICD-10-CM

## 2021-04-26 DIAGNOSIS — E78.5 HYPERLIPIDEMIA LDL GOAL <160: ICD-10-CM

## 2021-04-26 DIAGNOSIS — Z85.828 HISTORY OF BASAL CELL CARCINOMA: Primary | ICD-10-CM

## 2021-04-26 DIAGNOSIS — D48.5 NEOPLASM OF UNCERTAIN BEHAVIOR OF SKIN: ICD-10-CM

## 2021-04-26 DIAGNOSIS — R73.01 IFG (IMPAIRED FASTING GLUCOSE): ICD-10-CM

## 2021-04-26 DIAGNOSIS — L82.1 SEBORRHEIC KERATOSES: ICD-10-CM

## 2021-04-26 DIAGNOSIS — D22.9 MULTIPLE BENIGN NEVI: ICD-10-CM

## 2021-04-26 DIAGNOSIS — E03.9 HYPOTHYROIDISM, UNSPECIFIED TYPE: ICD-10-CM

## 2021-04-26 DIAGNOSIS — E66.01 MORBID OBESITY (H): Primary | ICD-10-CM

## 2021-04-26 DIAGNOSIS — K21.9 GASTROESOPHAGEAL REFLUX DISEASE WITHOUT ESOPHAGITIS: ICD-10-CM

## 2021-04-26 DIAGNOSIS — G89.29 CHRONIC MIDLINE LOW BACK PAIN WITHOUT SCIATICA: ICD-10-CM

## 2021-04-26 DIAGNOSIS — L81.4 LENTIGINES: ICD-10-CM

## 2021-04-26 DIAGNOSIS — M54.50 CHRONIC MIDLINE LOW BACK PAIN WITHOUT SCIATICA: ICD-10-CM

## 2021-04-26 DIAGNOSIS — Z12.11 SPECIAL SCREENING FOR MALIGNANT NEOPLASMS, COLON: ICD-10-CM

## 2021-04-26 LAB
ERYTHROCYTE [DISTWIDTH] IN BLOOD BY AUTOMATED COUNT: 13 % (ref 10–15)
HBA1C MFR BLD: 5.4 % (ref 0–5.6)
HCT VFR BLD AUTO: 39.6 % (ref 35–47)
HGB BLD-MCNC: 13.3 G/DL (ref 11.7–15.7)
MCH RBC QN AUTO: 31.4 PG (ref 26.5–33)
MCHC RBC AUTO-ENTMCNC: 33.6 G/DL (ref 31.5–36.5)
MCV RBC AUTO: 93 FL (ref 78–100)
PLATELET # BLD AUTO: 237 10E9/L (ref 150–450)
RBC # BLD AUTO: 4.24 10E12/L (ref 3.8–5.2)
WBC # BLD AUTO: 9.1 10E9/L (ref 4–11)

## 2021-04-26 PROCEDURE — 99214 OFFICE O/P EST MOD 30 MIN: CPT | Performed by: INTERNAL MEDICINE

## 2021-04-26 PROCEDURE — 80053 COMPREHEN METABOLIC PANEL: CPT | Performed by: INTERNAL MEDICINE

## 2021-04-26 PROCEDURE — 83036 HEMOGLOBIN GLYCOSYLATED A1C: CPT | Performed by: INTERNAL MEDICINE

## 2021-04-26 PROCEDURE — 11102 TANGNTL BX SKIN SINGLE LES: CPT | Performed by: PHYSICIAN ASSISTANT

## 2021-04-26 PROCEDURE — 36415 COLL VENOUS BLD VENIPUNCTURE: CPT | Performed by: INTERNAL MEDICINE

## 2021-04-26 PROCEDURE — 11103 TANGNTL BX SKIN EA SEP/ADDL: CPT | Performed by: PHYSICIAN ASSISTANT

## 2021-04-26 PROCEDURE — 88305 TISSUE EXAM BY PATHOLOGIST: CPT | Performed by: PATHOLOGY

## 2021-04-26 PROCEDURE — 99213 OFFICE O/P EST LOW 20 MIN: CPT | Mod: 25 | Performed by: PHYSICIAN ASSISTANT

## 2021-04-26 PROCEDURE — 80061 LIPID PANEL: CPT | Performed by: INTERNAL MEDICINE

## 2021-04-26 PROCEDURE — 85027 COMPLETE CBC AUTOMATED: CPT | Performed by: INTERNAL MEDICINE

## 2021-04-26 RX ORDER — TRAZODONE HYDROCHLORIDE 50 MG/1
50 TABLET, FILM COATED ORAL AT BEDTIME
Qty: 30 TABLET | Refills: 11 | Status: SHIPPED | OUTPATIENT
Start: 2021-04-26 | End: 2021-09-09

## 2021-04-26 RX ORDER — OMEPRAZOLE 40 MG/1
40 CAPSULE, DELAYED RELEASE ORAL DAILY
Qty: 90 CAPSULE | Refills: 3 | Status: SHIPPED | OUTPATIENT
Start: 2021-04-26 | End: 2021-11-24

## 2021-04-26 RX ORDER — LIDOCAINE 50 MG/G
PATCH TOPICAL
Qty: 30 PATCH | Refills: 3 | Status: SHIPPED | OUTPATIENT
Start: 2021-04-26 | End: 2024-08-21

## 2021-04-26 RX ORDER — LIDOCAINE HYDROCHLORIDE AND EPINEPHRINE 10; 10 MG/ML; UG/ML
3 INJECTION, SOLUTION INFILTRATION; PERINEURAL ONCE
Status: ACTIVE | OUTPATIENT
Start: 2021-04-26

## 2021-04-26 ASSESSMENT — MIFFLIN-ST. JEOR: SCORE: 1584.49

## 2021-04-26 ASSESSMENT — PAIN SCALES - GENERAL: PAINLEVEL: NO PAIN (0)

## 2021-04-26 NOTE — PROGRESS NOTES
"[facility]  CLINIC PROGRESS NOTE    Subjective:     Gastroesophageal reflux disease without esophagitis   Onofre has continued to take omeprazole for management of her gastroesophageal reflux disease.  She has done well with this medication and requests a refill.  Morbid obesity (H)    She did have some weight loss over the past year, but it has more recently had some difficulty.  She is hoping to resume her exercise once they get more active as the summer approaches.  Hypothyroidism, unspecified type   She is due for recheck of her thyroid function with endocrinology.  IFG (impaired fasting glucose)   Also due for recheck of an A1c      Past medical history, medications, allergies, social history, family history reviewed and updated in EPIC as of 4/26/2021 .    ROS  CONSTITUTIONAL: no fatigue, no unexpected change in weight  SKIN: no worrisome rashes, no worrisome moles, no worrisome lesions  EYES: no acute vision problems or changes  ENT: no ear problems, no mouth problems, no throat problems  RESP: no significant cough, no shortness of breath  CV: no chest pain, no palpitations, no new or worsening peripheral edema  GI: no nausea, no vomiting, no constipation, no diarrhea  : no frequency, no dysuria, no hematuria  MS: occasional low back pain   PSYCHIATRIC: no changes in mood or affect - having some difficulty with insomnia       Objective:  Vitals  /75 (BP Location: Left arm, Patient Position: Sitting, Cuff Size: Adult Large)   Pulse 71   Temp 97.7  F (36.5  C) (Temporal)   Ht 1.589 m (5' 2.56\")   Wt 105.2 kg (232 lb)   LMP 02/20/2015   SpO2 98%   BMI 41.68 kg/m    GEN: Alert Oriented x3 NAD  HEENT: Atraumatic, normocephalic, neck supple, no thyromegaly,   TM: TM bilaterally pearly and grey with normal light reflex  CV: RRR no murmurs or rubs  PULM: CTA no wheezes or crackles  ABD: obese. soft, nontender nondistended, no hepatosplenomegally  SKIN: No visible skin lesion or " ulcerations  EXT:  no edema bilateral lower extremities  NEURO: Gait and station normal, No focal neurologic deficits  PSYCH: Mood good, affect mood congruent    No images are attached to the encounter.    No results found for this or any previous visit (from the past 24 hour(s)).    Assessment/Plan:  Patient Instructions   Hyperlipidemia  Comment; check fasting lipid panel today     (K21.9) Gastroesophageal reflux disease without esophagitis  Comment: Continue omeprazole   Plan:     (E66.01) Morbid obesity (H)  Comment: Continue self directed dieting  Plan:     (E03.9) Hypothyroidism, unspecified type  Comment: continue follow up in endocrinology   Plan:     (R73.01) IFG (impaired fasting glucose)  Comment: Check hemoglobin A1c today  Plan:     Insomnia  Comment; Recommend a trial of trazodone 50 mg at night        Follow up in 6 months    Disclaimer: This note consists of symbols derived from keyboarding, dictation and/or voice recognition software. As a result, there may be errors in the script that have gone undetected. Please consider this when interpreting information found in this chart.    Jacob Nicole MD  [phone]

## 2021-04-26 NOTE — PATIENT INSTRUCTIONS
Hyperlipidemia  Comment; check fasting lipid panel today     (K21.9) Gastroesophageal reflux disease without esophagitis  Comment: Continue omeprazole   Plan:     (E66.01) Morbid obesity (H)  Comment: Continue self directed dieting  Plan:     (E03.9) Hypothyroidism, unspecified type  Comment: continue follow up in endocrinology   Plan:     (R73.01) IFG (impaired fasting glucose)  Comment: Check hemoglobin A1c today  Plan:     Insomnia  Comment; Recommend a trial of trazodone 50 mg at night

## 2021-04-26 NOTE — PATIENT INSTRUCTIONS

## 2021-04-26 NOTE — NURSING NOTE
Lidocaine-epinephrine 1-1:141837 % injection   1.5mL once for one use, starting 4/26/2021 ending 4/26/2021,  2mL disp, R-0, injection  Injected by Marissa Helton, CMA

## 2021-04-26 NOTE — NURSING NOTE
Dermatology Rooming Note    Onofre Robertson's goals for this visit include:   Chief Complaint   Patient presents with     Derm Problem     Onofre has 2 spots of concern. 1 skin tag.     Marissa Helton, CMA

## 2021-04-26 NOTE — LETTER
4/26/2021       RE: Onofre Robertson  [address]  [place] [address]     Dear Colleague,    Thank you for referring your patient, Onofre Robertson, to the [facility] [place] [facility]. Please see a copy of my visit note below.    [facility] Dermatology Note  Encounter Date: Apr 26, 2021  Office Visit      Dermatology Problem List:  0. NUB x2 - mid chest, R calf - s/p bx 4/26/21  1. History of NMSC  -BCC on the left side of the nose, s/p MMS 12/6/2018  2. AKs  - s/p cryo  3. DF - right upper arm  ______________________________    Assessment & Plan:  # History of nonmelanoma skin cancer (BCC), no clincial evidence of recurrence on the L side of the nose, NERD.   -Sunscreen: Apply 20 minutes prior to going outdoors and reapply every two hours, when wet or sweating. We recommend using an SPF 30 or higher, and to use one that is water resistant.     -ABCD's of melanoma were reviewed with patient and handout provided.   -Continue with annual skin exams    # Neoplasm of uncertain behavior on the R calf. The differential diagnosis includes evolving DF vs pigmented AK vs PIH vs other   - Shave biopsy performed today (see procedure note(s) below).      # Neoplasm of uncertain behavior on the mid chest. The differential diagnosis includes BCC vs other.   - Shave biopsy performed today (see procedure note(s) below).     # Seborrheic keratosis, non irritated.   -reassured benign    #Lentigines  -reassured benign    #Multiple Benign nevi  -Sunscreen: Apply 20 minutes prior to going outdoors and reapply every two hours, when wet or sweating. We recommend using an SPF 30 or higher, and to use one that is water resistant.     -ABCD's of melanoma were reviewed with patient and handout provided.   -Continue with annual skin exams    Procedures Performed:   - Shave biopsy x2 procedure note, location(s): mid chest and R calf.  After discussion of benefits and risks including but not limited to bleeding, infection, scar, incomplete removal, recurrence, and non-diagnostic biopsy, written consent and photographs were obtained. The area was cleaned with isopropyl alcohol. 0.5mL of 1% lidocaine with epinephrine was injected to obtain adequate anesthesia of lesion(s). Shave biopsy at site(s) performed. Hemostasis was achieved with aluminium chloride. Petrolatum ointment and a sterile dressing were applied. The patient tolerated the procedure and no complications were noted. The patient was provided with verbal and written post care instructions.      Follow-up: 1 year(s) in-person, or earlier for new or changing lesions    Staff:     All risks, benefits and alternatives were discussed with patient.  Patient is in agreement and understands the assessment and plan.  All questions were answered.    Kirsty Saavedra PA-C, [facility]  [facility] [place]: Phone: [phone], Fax: [phone]  [facility]: Phone: [phone],  Fax: [phone]  ____________________________________________    CC: Derm Problem (Onofre has 2 spots of concern. 1 skin tag.)    HPI:  Ms. Onofre Robertson is a 61 year old female who presents today as a return patient for a few spots of concern. Notes a spot on the R calf which has been present 4mo. Was itchy, and scaly, and open and bleeding now healing but not quickly.     Patient is otherwise feeling well, without additional concerns.    Labs:  none    Physical Exam:  Vitals: LMP 02/20/2015   SKIN: Full skin, which includes the head/face, both arms, chest, back, abdomen,both legs, genitalia and/or groin buttocks, digits and/or nails, was examined.   - R calf - 1cm brown to violaceous scaly macule  - mid chest - 5mm pink shiny papule  - There is no erythema, telangectasias, nodularity, or pigmentation on the L nose.  - Scattered brown macules on  sun exposed areas.  - There are waxy stuck on tan to brown papules on the trunk..   - Multiple regular brown pigmented macules and papules are identified on the trunk and extremities  - Romero's skin type II.  .   - No other lesions of concern on areas examined.     Medications:  Current Outpatient Medications   Medication     levothyroxine (SYNTHROID/LEVOTHROID) 112 MCG tablet     lidocaine (LIDODERM) 5 % patch     omeprazole (PRILOSEC) 40 MG DR capsule     Current Facility-Administered Medications   Medication     lidocaine 1% with EPINEPHrine 1:100,000 injection 3 mL      Past Medical/Surgical History:   Patient Active Problem List   Diagnosis     Tobacco use disorder     Obesity     Hypothyroidism     Hyperlipidemia LDL goal <160     Osteoarthrosis involving multiple sites     Family history of malignant neoplasm of breast     Liver hemangioma     Esophageal reflux     Abdominal pain, generalized     IFG (impaired fasting glucose)     Lower back pain     Morbid obesity (H)     Past Medical History:   Diagnosis Date     Arthritis      Esophageal reflux      H pylori ulcer      Hypercholesterolemia      Hypothyroidism      Lyme disease      Thyroid cancer 1980       CC Dr. Fletcher on close of this encounter.

## 2021-04-26 NOTE — PROGRESS NOTES
[facility] Dermatology Note  Encounter Date: Apr 26, 2021  Office Visit      Dermatology Problem List:  0. NUB x2 - mid chest, R calf - s/p bx 4/26/21  1. History of NMSC  -BCC on the left side of the nose, s/p MMS 12/6/2018  2. AKs  - s/p cryo  3. DF - right upper arm  ______________________________    Assessment & Plan:  # History of nonmelanoma skin cancer (BCC), no clincial evidence of recurrence on the L side of the nose, NERD.   -Sunscreen: Apply 20 minutes prior to going outdoors and reapply every two hours, when wet or sweating. We recommend using an SPF 30 or higher, and to use one that is water resistant.     -ABCD's of melanoma were reviewed with patient and handout provided.   -Continue with annual skin exams    # Neoplasm of uncertain behavior on the R calf. The differential diagnosis includes evolving DF vs pigmented AK vs PIH vs other   - Shave biopsy performed today (see procedure note(s) below).      # Neoplasm of uncertain behavior on the mid chest. The differential diagnosis includes BCC vs other.   - Shave biopsy performed today (see procedure note(s) below).     # Seborrheic keratosis, non irritated.   -reassured benign    #Lentigines  -reassured benign    #Multiple Benign nevi  -Sunscreen: Apply 20 minutes prior to going outdoors and reapply every two hours, when wet or sweating. We recommend using an SPF 30 or higher, and to use one that is water resistant.     -ABCD's of melanoma were reviewed with patient and handout provided.   -Continue with annual skin exams    Procedures Performed:   - Shave biopsy x2 procedure note, location(s): mid chest and R calf. After discussion of benefits and risks including but not limited to bleeding, infection, scar, incomplete removal, recurrence, and non-diagnostic biopsy, written consent and photographs were obtained. The area was cleaned with isopropyl alcohol. 0.5mL of 1% lidocaine with epinephrine was injected to obtain adequate  anesthesia of lesion(s). Shave biopsy at site(s) performed. Hemostasis was achieved with aluminium chloride. Petrolatum ointment and a sterile dressing were applied. The patient tolerated the procedure and no complications were noted. The patient was provided with verbal and written post care instructions.      Follow-up: 1 year(s) in-person, or earlier for new or changing lesions    Staff:     All risks, benefits and alternatives were discussed with patient.  Patient is in agreement and understands the assessment and plan.  All questions were answered.    Kirsty Saavedra PA-C, MPAS  [facility] [place]: Phone: [phone], Fax: [phone]  [facility]: Phone: [phone],  Fax: [phone]  ____________________________________________    CC: Derm Problem (Onofre has 2 spots of concern. 1 skin tag.)    HPI:  Ms. Onofre Robertson is a 61 year old female who presents today as a return patient for a few spots of concern. Notes a spot on the R calf which has been present 4mo. Was itchy, and scaly, and open and bleeding now healing but not quickly.     Patient is otherwise feeling well, without additional concerns.    Labs:  none    Physical Exam:  Vitals: [facility] 02/20/2015   SKIN: Full skin, which includes the head/face, both arms, chest, back, abdomen,both legs, genitalia and/or groin buttocks, digits and/or nails, was examined.   - R calf - 1cm brown to violaceous scaly macule  - mid chest - 5mm pink shiny papule  - There is no erythema, telangectasias, nodularity, or pigmentation on the L nose.  - Scattered brown macules on sun exposed areas.  - There are waxy stuck on tan to brown papules on the trunk..   - Multiple regular brown pigmented macules and papules are identified on the trunk and extremities  - Romero's skin type II.  .   - No other lesions of concern on areas examined.     Medications:  Current Outpatient Medications    Medication     levothyroxine (SYNTHROID/LEVOTHROID) 112 MCG tablet     lidocaine (LIDODERM) 5 % patch     omeprazole (PRILOSEC) 40 MG DR capsule     Current Facility-Administered Medications   Medication     lidocaine 1% with EPINEPHrine 1:100,000 injection 3 mL      Past Medical/Surgical History:   Patient Active Problem List   Diagnosis     Tobacco use disorder     Obesity     Hypothyroidism     Hyperlipidemia LDL goal <160     Osteoarthrosis involving multiple sites     Family history of malignant neoplasm of breast     Liver hemangioma     Esophageal reflux     Abdominal pain, generalized     IFG (impaired fasting glucose)     Lower back pain     Morbid obesity (H)     Past Medical History:   Diagnosis Date     Arthritis      Esophageal reflux      H pylori ulcer      Hypercholesterolemia      Hypothyroidism      Lyme disease      Thyroid cancer 1980       CC Dr. Fletcher on close of this encounter.

## 2021-04-27 LAB
ALBUMIN SERPL-MCNC: 3.7 G/DL (ref 3.4–5)
ALP SERPL-CCNC: 85 U/L (ref 40–150)
ALT SERPL W P-5'-P-CCNC: 22 U/L (ref 0–50)
ANION GAP SERPL CALCULATED.3IONS-SCNC: 3 MMOL/L (ref 3–14)
AST SERPL W P-5'-P-CCNC: 13 U/L (ref 0–45)
BILIRUB SERPL-MCNC: 0.5 MG/DL (ref 0.2–1.3)
BUN SERPL-MCNC: 12 MG/DL (ref 7–30)
CALCIUM SERPL-MCNC: 9.3 MG/DL (ref 8.5–10.1)
CHLORIDE SERPL-SCNC: 107 MMOL/L (ref 94–109)
CHOLEST SERPL-MCNC: 249 MG/DL
CO2 SERPL-SCNC: 29 MMOL/L (ref 20–32)
CREAT SERPL-MCNC: 0.86 MG/DL (ref 0.52–1.04)
GFR SERPL CREATININE-BSD FRML MDRD: 74 ML/MIN/{1.73_M2}
GLUCOSE SERPL-MCNC: 88 MG/DL (ref 70–99)
HDLC SERPL-MCNC: 49 MG/DL
LDLC SERPL CALC-MCNC: 168 MG/DL
NONHDLC SERPL-MCNC: 200 MG/DL
POTASSIUM SERPL-SCNC: 4.3 MMOL/L (ref 3.4–5.3)
PROT SERPL-MCNC: 7.2 G/DL (ref 6.8–8.8)
SODIUM SERPL-SCNC: 139 MMOL/L (ref 133–144)
TRIGL SERPL-MCNC: 158 MG/DL

## 2021-04-28 ENCOUNTER — HOSPITAL ENCOUNTER (OUTPATIENT)
Dept: MAMMOGRAPHY | Facility: CLINIC | Age: 61
Discharge: HOME | End: 2021-04-28
Attending: INTERNAL MEDICINE | Admitting: INTERNAL MEDICINE
Payer: COMMERCIAL

## 2021-04-28 DIAGNOSIS — Z12.31 VISIT FOR SCREENING MAMMOGRAM: ICD-10-CM

## 2021-04-28 LAB — COPATH REPORT: NORMAL

## 2021-04-28 PROCEDURE — 77063 BREAST TOMOSYNTHESIS BI: CPT

## 2021-06-23 ENCOUNTER — TELEPHONE (OUTPATIENT)
Dept: FAMILY MEDICINE | Facility: CLINIC | Age: 61
End: 2021-06-23

## 2021-06-23 ENCOUNTER — VIRTUAL VISIT (OUTPATIENT)
Dept: FAMILY MEDICINE | Facility: CLINIC | Age: 61
End: 2021-06-23
Payer: COMMERCIAL

## 2021-06-23 DIAGNOSIS — R30.0 DYSURIA: Primary | ICD-10-CM

## 2021-06-23 PROCEDURE — 99213 OFFICE O/P EST LOW 20 MIN: CPT | Mod: TEL | Performed by: PHYSICIAN ASSISTANT

## 2021-06-23 RX ORDER — NITROFURANTOIN 25; 75 MG/1; MG/1
100 CAPSULE ORAL 2 TIMES DAILY
Qty: 14 CAPSULE | Refills: 0 | Status: SHIPPED | OUTPATIENT
Start: 2021-06-23 | End: 2021-09-09

## 2021-06-23 NOTE — TELEPHONE ENCOUNTER
Patient called to request RX abx for UTI symptoms.     Onset today.     Symptoms:  Burning, pain in lower pelvic area after voiding, not sure about hematuria (patient thinks pink tinged).   Denies fever    Patient is in [facility]---approx 100 miles from nearest clinic .     Pended pharmacy in [facility] (still approx 40 miles from patient's current location)     Scheduled VV with provider today to assess and treat.     Jaja CISSE, RN      June 23, 2021  11:36 AM

## 2021-06-23 NOTE — PROGRESS NOTES
Onofre is a 61 year old who is being evaluated via a billable telephone visit.      What phone number would you like to be contacted at? [phone]  How would you like to obtain your AVS? Andrés Downey   Onofre is a 61 year old who presents for the following health issues     HPI     [place]  Has urinary dribbling, has urgency and burning  The urine looked a little pink  No fever or flank pain  Denies vaginal odor but has mild discharge  No bloody vaginal discharge  No recent intercourse  Not prone to UTI      Review of Systems         Objective           Vitals:  No vitals were obtained today due to virtual visit.    Physical Exam   healthy, alert and no distress  PSYCH: Alert and oriented times 3; coherent speech, normal   rate and volume, able to articulate logical thoughts, able   to abstract reason, no tangential thoughts, no hallucinations   or delusions  Her affect is normal  Remainder of exam unable to be completed due to telephone visits      Assessment and Plan:     (R30.0) Dysuria  (primary encounter diagnosis)  Comment: push fluids, have urine checked if sxs persist  Plan: nitroFURantoin macrocrystal-monohydrate         (MACROBID) 100 MG capsule        Bid x 7d      Kavya Loyola PA-C              Phone call duration: 11 minutes

## 2021-08-03 DIAGNOSIS — Z11.59 ENCOUNTER FOR SCREENING FOR OTHER VIRAL DISEASES: ICD-10-CM

## 2021-09-09 ENCOUNTER — LAB (OUTPATIENT)
Dept: LAB | Facility: CLINIC | Age: 61
End: 2021-09-09
Attending: SURGERY
Payer: COMMERCIAL

## 2021-09-09 DIAGNOSIS — Z11.59 ENCOUNTER FOR SCREENING FOR OTHER VIRAL DISEASES: ICD-10-CM

## 2021-09-09 LAB — SARS-COV-2 RNA RESP QL NAA+PROBE: NEGATIVE

## 2021-09-09 PROCEDURE — U0003 INFECTIOUS AGENT DETECTION BY NUCLEIC ACID (DNA OR RNA); SEVERE ACUTE RESPIRATORY SYNDROME CORONAVIRUS 2 (SARS-COV-2) (CORONAVIRUS DISEASE [COVID-19]), AMPLIFIED PROBE TECHNIQUE, MAKING USE OF HIGH THROUGHPUT TECHNOLOGIES AS DESCRIBED BY CMS-2020-01-R: HCPCS

## 2021-09-09 PROCEDURE — U0005 INFEC AGEN DETEC AMPLI PROBE: HCPCS

## 2021-09-09 RX ORDER — UBIDECARENONE 75 MG
100 CAPSULE ORAL DAILY
COMMUNITY

## 2021-09-10 ENCOUNTER — HOSPITAL ENCOUNTER (OUTPATIENT)
Facility: CLINIC | Age: 61
Discharge: HOME | End: 2021-09-10
Attending: SURGERY | Admitting: SURGERY
Payer: COMMERCIAL

## 2021-09-10 VITALS
HEART RATE: 72 BPM | TEMPERATURE: 97.7 F | WEIGHT: 232 LBS | RESPIRATION RATE: 16 BRPM | BODY MASS INDEX: 42.69 KG/M2 | OXYGEN SATURATION: 98 % | HEIGHT: 62 IN | DIASTOLIC BLOOD PRESSURE: 59 MMHG | SYSTOLIC BLOOD PRESSURE: 112 MMHG

## 2021-09-10 LAB — COLONOSCOPY: NORMAL

## 2021-09-10 PROCEDURE — 250N000011 HC RX IP 250 OP 636: Performed by: SURGERY

## 2021-09-10 PROCEDURE — 99152 MOD SED SAME PHYS/QHP 5/>YRS: CPT | Mod: 59 | Performed by: SURGERY

## 2021-09-10 PROCEDURE — G0500 MOD SEDAT ENDO SERVICE >5YRS: HCPCS | Performed by: SURGERY

## 2021-09-10 PROCEDURE — 45381 COLONOSCOPY SUBMUCOUS NJX: CPT | Mod: PT | Performed by: SURGERY

## 2021-09-10 PROCEDURE — 250N000013 HC RX MED GY IP 250 OP 250 PS 637: Performed by: SURGERY

## 2021-09-10 PROCEDURE — 88305 TISSUE EXAM BY PATHOLOGIST: CPT | Mod: TC | Performed by: SURGERY

## 2021-09-10 PROCEDURE — 45385 COLONOSCOPY W/LESION REMOVAL: CPT | Mod: PT | Performed by: SURGERY

## 2021-09-10 PROCEDURE — 45385 COLONOSCOPY W/LESION REMOVAL: CPT | Performed by: SURGERY

## 2021-09-10 RX ORDER — LIDOCAINE 40 MG/G
CREAM TOPICAL
Status: DISCONTINUED | OUTPATIENT
Start: 2021-09-10 | End: 2021-09-10 | Stop reason: HOSPADM

## 2021-09-10 RX ORDER — EPINEPHRINE 1 MG/ML
0.1 INJECTION, SOLUTION, CONCENTRATE INTRAVENOUS
Status: DISCONTINUED | OUTPATIENT
Start: 2021-09-10 | End: 2021-09-10 | Stop reason: HOSPADM

## 2021-09-10 RX ORDER — SIMETHICONE 40MG/0.6ML
133 SUSPENSION, DROPS(FINAL DOSAGE FORM)(ML) ORAL
Status: COMPLETED | OUTPATIENT
Start: 2021-09-10 | End: 2021-09-10

## 2021-09-10 RX ORDER — FLUMAZENIL 0.1 MG/ML
0.2 INJECTION, SOLUTION INTRAVENOUS
Status: DISCONTINUED | OUTPATIENT
Start: 2021-09-10 | End: 2021-09-10 | Stop reason: HOSPADM

## 2021-09-10 RX ORDER — ONDANSETRON 4 MG/1
4 TABLET, ORALLY DISINTEGRATING ORAL EVERY 6 HOURS PRN
Status: CANCELLED | OUTPATIENT
Start: 2021-09-10

## 2021-09-10 RX ORDER — NALOXONE HYDROCHLORIDE 0.4 MG/ML
0.4 INJECTION, SOLUTION INTRAMUSCULAR; INTRAVENOUS; SUBCUTANEOUS
Status: CANCELLED | OUTPATIENT
Start: 2021-09-10

## 2021-09-10 RX ORDER — FENTANYL CITRATE 50 UG/ML
50-100 INJECTION, SOLUTION INTRAMUSCULAR; INTRAVENOUS
Status: DISCONTINUED | OUTPATIENT
Start: 2021-09-10 | End: 2021-09-10 | Stop reason: HOSPADM

## 2021-09-10 RX ORDER — FENTANYL CITRATE 50 UG/ML
25-50 INJECTION, SOLUTION INTRAMUSCULAR; INTRAVENOUS
Status: DISCONTINUED | OUTPATIENT
Start: 2021-09-10 | End: 2021-09-10 | Stop reason: HOSPADM

## 2021-09-10 RX ORDER — PROCHLORPERAZINE MALEATE 10 MG
10 TABLET ORAL EVERY 6 HOURS PRN
Status: CANCELLED | OUTPATIENT
Start: 2021-09-10

## 2021-09-10 RX ORDER — NALOXONE HYDROCHLORIDE 0.4 MG/ML
0.2 INJECTION, SOLUTION INTRAMUSCULAR; INTRAVENOUS; SUBCUTANEOUS
Status: CANCELLED | OUTPATIENT
Start: 2021-09-10

## 2021-09-10 RX ORDER — ONDANSETRON 2 MG/ML
4 INJECTION INTRAMUSCULAR; INTRAVENOUS
Status: DISCONTINUED | OUTPATIENT
Start: 2021-09-10 | End: 2021-09-10 | Stop reason: HOSPADM

## 2021-09-10 RX ORDER — ONDANSETRON 2 MG/ML
4 INJECTION INTRAMUSCULAR; INTRAVENOUS EVERY 6 HOURS PRN
Status: CANCELLED | OUTPATIENT
Start: 2021-09-10

## 2021-09-10 RX ORDER — FLUMAZENIL 0.1 MG/ML
0.2 INJECTION, SOLUTION INTRAVENOUS
Status: CANCELLED | OUTPATIENT
Start: 2021-09-10 | End: 2021-09-10

## 2021-09-10 RX ORDER — ATROPINE SULFATE 0.4 MG/ML
0.4 AMPUL (ML) INJECTION
Status: DISCONTINUED | OUTPATIENT
Start: 2021-09-10 | End: 2021-09-10 | Stop reason: HOSPADM

## 2021-09-10 RX ADMIN — SIMETHICONE 133 MG: 20 EMULSION ORAL at 09:26

## 2021-09-10 RX ADMIN — MIDAZOLAM 2 MG: 1 INJECTION INTRAMUSCULAR; INTRAVENOUS at 09:17

## 2021-09-10 RX ADMIN — MIDAZOLAM 1 MG: 1 INJECTION INTRAMUSCULAR; INTRAVENOUS at 09:24

## 2021-09-10 ASSESSMENT — MIFFLIN-ST. JEOR: SCORE: 1575.6

## 2021-09-10 NOTE — H&P
"Pre-Endoscopy History and Physical     Onofre Robertson MRN# [id number]   YOB: 1960 Age: 61 year old     Date of Procedure: 9/10/2021  Primary care provider: Jacob Nicole  Type of Endoscopy: colonoscopy  Reason for Procedure: screening, incidental blood in stool  Type of Anesthesia Anticipated: Moderate Sedation    HPI:    Onofre is a 61 year old female who will be undergoing the above procedure.      A history and physical has been performed. The patient's medications and allergies have been reviewed. The risks and benefits of the procedure and the sedation options and risks were discussed with the patient.  All questions were answered and informed consent was obtained.      She denies a personal or family history of anesthesia complications or bleeding disorders.     Allergies   Allergen Reactions     Contrast Dye      respiratory     Morphine Anaphylaxis     Penicillins Anaphylaxis     \"ANAPHYLACTIC\" ER     Topamax [Topiramate] Other (See Comments)     Stomach burning and rapid heart rate       Celexa [Citalopram] Itching, Swelling and Rash     Tongue and face numbness, couldn't taste right,  tongue swelling and itchy rash     Prozac [Fluoxetine] Itching, Swelling and Rash     Tongue and face numbness, couldn't taste right, tongue swelling and itchy rash        Prior to Admission Medications   Prescriptions Last Dose Informant Patient Reported? Taking?   cyanocobalamin (VITAMIN B-12) 100 MCG tablet   Yes Yes   Sig: Take 100 mcg by mouth daily   levothyroxine (SYNTHROID/LEVOTHROID) 112 MCG tablet   Yes Yes   Sig: Take 112 mcg by mouth daily   lidocaine (LIDODERM) 5 % patch   No Yes   Sig: APPLY 1 PATCH ONTO SKIN EVERY DAY   omeprazole (PRILOSEC) 40 MG DR capsule   No Yes   Sig: Take 1 capsule (40 mg) by mouth daily      Facility-Administered Medications Last Administration Doses Remaining   lidocaine 1% with EPINEPHrine 1:100,000 injection 3 mL None recorded 1   lidocaine 1% with " EPINEPHrine 1:100,000 injection 3 mL None recorded 1          Patient Active Problem List   Diagnosis     Obesity     Hypothyroidism     Hyperlipidemia LDL goal <160     Osteoarthrosis involving multiple sites     Family history of malignant neoplasm of breast     Liver hemangioma     Esophageal reflux     Abdominal pain, generalized     IFG (impaired fasting glucose)     Lower back pain     Morbid obesity (H)        Past Medical History:   Diagnosis Date     Arthritis      Esophageal reflux      H pylori ulcer      Hypercholesterolemia      Hypothyroidism      Lyme disease      Thyroid cancer      Tobacco use disorder 2003        Past Surgical History:   Procedure Laterality Date      SECTION       COLONOSCOPY       CYSTOSCOPY N/A 2015    Procedure: CYSTOSCOPY;  Surgeon: Denisse Luong DO;  Location: [facility] OR     DAVINCI HYSTERECTOMY TOTAL, BILATERAL SALPINGO-OOPHORECTOMY, COMBINED Bilateral 2015    Procedure: COMBINED DAVINCI HYSTERECTOMY TOTAL, SALPINGO-OOPHORECTOMY;  Surgeon: Denisse Luong DO;  Location: [facility] OR     HYSTERECTOMY       LEFT BREAST LUMPECTOMY       LIGATE FALLOPIAN TUBE NOS       ORTHOPEDIC SURGERY       RHINOPLASTY       THYROIDECTOMY       TONSILLECTOMY         Social History     Tobacco Use     Smoking status: Former Smoker     Packs/day: 0.50     Years: 45.00     Pack years: 22.50     Types: Hookah     Quit date: 1/15/2011     Years since quitting: 10.6     Smokeless tobacco: Current User     Tobacco comment: e-cigarettes    Substance Use Topics     Alcohol use: Yes     Alcohol/week: 0.0 standard drinks     Comment: 6 PER WEEK       Family History   Problem Relation Age of Onset     Heart Disease Father          59 YO MI X4,COLITIS,DM,OBESITY     Hyperlipidemia Father      Colitis Father      Gastrointestinal Disease Mother         76 YO STOMACH ULCERS,QUIT DRINKING 12 YEARS AGO     Breast Cancer Mother      Thyroid Disease Mother      Other Cancer  "Mother      Neurologic Disorder Sister          21 YO EPILEPSY     Colon Cancer No family hx of        REVIEW OF SYSTEMS:     5 point ROS negative except as noted above in HPI, including Gen., Resp., CV, GI &  system review.      PHYSICAL EXAM:   Ht 1.575 m (5' 2\")   Wt 105.2 kg (232 lb)   LMP 2015   BMI 42.43 kg/m   Estimated body mass index is 42.43 kg/m  as calculated from the following:    Height as of this encounter: 1.575 m (5' 2\").    Weight as of this encounter: 105.2 kg (232 lb).   GENERAL APPEARANCE: healthy, alert and no distress  MENTAL STATUS: alert  AIRWAY EXAM: Mask on  RESP: lungs clear to auscultation - no rales, rhonchi or wheezes  CV: regular rates and rhythm      DIAGNOSTICS:    Not indicated      IMPRESSION   ASA Class 3 - Severe systemic disease, but not incapacitating        PLAN:       Plan for colonoscopy. We discussed the risks, benefits and alternatives and the patient wished to proceed.    The above has been forwarded to the consulting provider.      Signed Electronically by: Theo Qureshi MD  September 10, 2021    "

## 2021-09-10 NOTE — LETTER
August 24, 2021      Onofre Robertson  [address]  [place] [id number]        Dear Onofre,     Please be aware that coverage of these services is subject to the terms and limitations of your health insurance plan.  Call member services at your health plan with any benefit or coverage questions.    Thank you for choosing [facility]. You are scheduled for the following service(s):    Date:  9/10/2021             Procedure:  COLONOSCOPY  Doctor:        Dr. Qureshi   Arrival Time:  8:15am *Enter and check in at the Main Hospital Entrance*  Procedure Time:  8:45am      Location:   [facility]        Endoscopy Department, First Floor         [address] [place], [place] [address]      [phone] or [phone] (Replaced by [facility]) to reschedule        MIRALAX -GATORADE  PREP  Colonoscopy is the most accurate test to detect colon polyps and colon cancer; and the only test where polyps can be removed. During this procedure, a doctor examines the lining of your large intestine and rectum through a flexible tube.   Transportation  You must arrange for a ride for the day of your procedure with a responsible adult. A taxi , Uber, etc, is not an option unless you are accompanied by a responsible adult. If you fail to arrange transportation with a responsible adult, your procedure will be cancelled and rescheduled.    Purchase the  following supplies at your local pharmacy:  - 2 (two) bisacodyl tablets: each tablet contains 5 mg.  (Dulcolax  laxative NOT Dulcolax  stool softener)   - 1 (one) 8.3 oz bottle of Polyethylene Glycol (PEG) 3350 Powder   (MiraLAX , Smooth LAX , ClearLAX  or equivalent)  - 64 oz Gatorade    Regular Gatorade, Gatorade G2 , Powerade , Powerade Zero  or Pedialyte  is acceptable. Red colored flavors are not allowed; all other colors (yellow, green, orange, purple and blue) are okay. It is also okay to buy two 2.12 oz packets of powdered  Gatorade that can be mixed with water to a total volume of 64 oz of liquid.  - 1 (one) 10 oz bottle of Magnesium Citrate (Red colored flavors are not allowed)  It is also okay for you to use a 0.5 oz package of powdered magnesium citrate (17 g) mixed with 10 oz of water.      PREPARATION FOR COLONOSCOPY    7 days before:    Discontinue fiber supplements and medications containing iron. This includes Metamucil  and Fibercon ; and multivitamins with iron.    3 days before:    Begin a low-fiber diet. A low-fiber diet helps making the cleanout more effective.     Examples of a low-fiber diet include (but are not limited to): white bread, white rice, pasta, crackers, fish, chicken, eggs, ground beef, creamy peanut butter, cooked/steamed/boiled vegetables, canned fruit, bananas, melons, milk, plain yogurt cheese, salad dressing and other condiments.     The following are not allowed on a low-fiber diet: seeds, nuts, popcorn, bran, whole wheat, corn, quinoa, raw fruits and vegetables, berries and dried fruit, beans and lentils.    For additional details on low-fiber diet, please refer to the table on the last page.    2 days before:    Continue the low-fiber diet.     Drink at least 8 glasses of water throughout the day.     Stop eating solid foods at 11:45 pm.    1 day before:    In the morning: begin a clear liquid diet (liquids you can see through).     Examples of a clear liquid diet include: water, clear broth or bouillon, Gatorade, Pedialyte or Powerade, carbonated and non-carbonated soft drinks (Sprite , 7-Up , ginger ale), strained fruit juices without pulp (apple, white grape, white cranberry), Jell-O  and popsicles.     The following are not allowed on a clear liquid diet: red liquids, alcoholic beverages, dairy products (milk, creamer, and yogurt), protein shakes, creamy broths, juice with pulp and chewing tobacco.    At noon: take 2 (two) bisacodyl tablets     At 4 (and no later than 6pm): start drinking the  Miralax-Gatorade preparation (8.3 oz of Miralax mixed with 64 oz of Gatorade in a large pitcher). Drink 1(one) 8 oz glass every 15 minutes thereafter, until the mixture is gone.  COLON CLEANSING TIPS: drink adequate amounts of fluids before and after your colon cleansing to prevent dehydration. Stay near a toilet because you will have diarrhea. Even if you are sitting on the toilet, continue to drink the cleansing solution every 15 minutes. If you feel nauseous or vomit, rinse your mouth with water, take a 15 to 30-minute-break and then continue drinking the solution. You will be uncomfortable until the stool has flushed from your colon (in about 2 to 4 hours). You may feel chilled.    Day of your procedure  You may take all of your morning medications including blood pressure medications, blood thinners (if you have not been instructed to stop these by our office), methadone, anti-seizure medications with sips of water 3 hours prior to your procedure or earlier. Do not take insulin or vitamins prior to your procedure. Continue the clear liquid diet.     4 hours prior: drink 10 oz of magnesium citrate. It may be easier to drink it with a straw.    STOP consuming all liquids after that.     Do not take anything by mouth during this time.     Allow extra time to travel to your procedure as you may need to stop and use a restroom along the way.    You are ready for the procedure, if you followed all instructions and your stool is no longer formed, but clear or yellow liquid. If you are unsure whether your colon is clean, please call our office at [phone] before you leave for your appointment.    Bring the following to your procedure:  - Insurance Card/Photo ID.   - List of current medications including over-the-counter medications and supplements.   - Your rescue inhaler if you currently use one to control asthma.    Canceling or rescheduling your appointment:   If you must cancel or reschedule your appointment,  please call [phone] as soon as possible.  COLONOSCOPY PRE-PROCEDURE CHECKLIST    If you have diabetes, ask your regular doctor for diet and medication restrictions.  If you take an anticoagulant or anti-platelet medication (such as Coumadin , Lovenox , Pradaxa , Xarelto , Eliquis , etc.), please call your primary doctor for advice on holding this medication.  If you take aspirin you may continue to do so.  If you are or may be pregnant, please discuss the risks and benefits of this procedure with your doctor.        What happens during a colonoscopy?    Plan to spend up to two hours, starting at registration time, at the endoscopy center the day of your procedure. The colonoscopy takes an average of 15 to 30 minutes. Recovery time is about 30 minutes.      Before the exam:    You will change into a gown.    Your medical history and medication list will be reviewed with you, unless that has been done over the phone prior to the procedure.     A nurse will insert an intravenous (IV) line into your hand or arm.    The doctor will meet with you and will give you a consent form to sign.  During the exam:     Medicine will be given through the IV line to help you relax.     Your heart rate and oxygen levels will be monitored. If your blood pressure is low, you may be given fluids through the IV line.     The doctor will insert a flexible hollow tube, called a colonoscope, into your rectum. The scope will be advanced slowly through the large intestine (colon).    You may have a feeling of fullness or pressure.     If an abnormal tissue or a polyp is found, the doctor may remove it through the endoscope for closer examination, or biopsy. Tissue removal is painless    After the exam:           Any tissue samples removed during the exam will be sent to a lab for evaluation. It may take 5-7 working days for you to be notified of the results.     A nurse will provide you with complete discharge instructions before you  leave the endoscopy center. Be sure to ask the nurse for specific instructions if you take blood thinners such as Aspirin, Coumadin or Plavix.     The doctor will prepare a full report for you and for the physician who referred you for the procedure.     Your doctor will talk with you about the initial results of your exam.      Medication given during the exam will prohibit you from driving for the rest of the day.     Following the exam, you may resume your normal diet. Your first meal should be light, no greasy foods. Avoid alcohol until the next day.     You may resume your regular activities the day after the procedure.         LOW-FIBER DIET    Foods RECOMMENDED Foods to AVOID   Breads, Cereal, Rice and Pasta:   White bread, rolls, biscuits, croissant and johnny toast.   Waffles, [place] toast and pancakes.   White rice, noodles, pasta, macaroni and peeled cooked potatoes.   Plain crackers and saltines.   Cooked cereals: farina, cream of rice.   Cold cereals: Puffed Rice , Rice Krispies , Corn Flakes  and Special K    Breads, Cereal, Rice and Pasta:   Breads or rolls with nuts, seeds or fruit.   Whole wheat, pumpernickel, rye breads and cornbread.   Potatoes with skin, brown or wild rice, and kasha (buckwheat).     Vegetables:   Tender cooked and canned vegetables without seeds: carrots, asparagus tips, green or wax beans, pumpkin, spinach, lima beans. Vegetables:   Raw or steamed vegetables.   Vegetables with seeds.   Sauerkraut.   Winter squash, peas, broccoli, Brussel sprouts, cabbage, onions, cauliflower, baked beans, peas and corn.   Fruits:   Strained fruit juice.   Canned fruit, except pineapple.   Ripe bananas and melon. Fruits:   Prunes and prune juice.   Raw fruits.   Dried fruits: figs, dates and raisins.   Milk/Dairy:   Milk: plain or flavored.   Yogurt, custard and ice cream.   Cheese and cottage cheese Milk/Dairy:     Meat and other proteins:   ground, well-cooked tender beef, lamb, ham, veal,  pork, fish, poultry and organ meats.   Eggs.   Peanut butter without nuts. Meat and other proteins:   Tough, fibrous meats with gristle.   Dry beans, peas and lentils.   Peanut butter with nuts.   Tofu.   Fats, Snack, Sweets, Condiments and Beverages:   Margarine, butter, oils, mayonnaise, sour cream and salad dressing, plain gravy.   Sugar, hard candy, clear jelly, honey and syrup.   Spices, cooked herbs, bouillon, broth and soups made with allowed vegetable, ketchup and mustard.   Coffee, tea and carbonated drinks.   Plain cakes, cookies and pretzels.   Gelatin, plain puddings, custard, ice cream, sherbet and popsicles. Fats, Snack, Sweets, Condiments and Beverages:   Nuts, seeds and coconut.   Jam, marmalade and preserves.   Pickles, olives, relish and horseradish.   All desserts containing nuts, seeds, dried fruit and coconut; or made from whole grains or bran.   Candy made with nuts or seeds.   Popcorn.     DIRECTIONS TO THE ENDOSCOPY DEPARTMENT    From the north ([place])  Take [address] South, exit on [address] [address]. Get into the left hand alex, turn left (east), go one-half mile to [address] and turn left. Go north to the second stoplight, take a right on [address] and follow it to the Main Hospital entrance.    From the south ([facility])  Take 35N to the 35E split and exit on [address] [address]. On [address] [address], turn left (west) to [address]. Turn right (north) on [address]. Go north to the second stoplight, take a right on [address] and follow it to the Main Hospital entrance.    From the east via 35E ([facility])  Take 35E south to [address] [address] exit. Turn right on [address] [address]. Go west to [address]. Turn right (north) on [address]. Go to the second stoplight, take a right on [address] to the Main Hospital entrance.    From the east via [address] ([facility])  Take [address] to [address].  Turn left (south) on [address] to [address], turn left (east) on [address] and follow it to the Main Hospital entrance.    From the west via [address] ([place]  Take [address] east to [address]. Turn right (south) on [address] to [address], turn left (east) on [address] and follow it to the Main Hospital entrance.

## 2021-09-10 NOTE — DISCHARGE INSTRUCTIONS
The patient has received a copy of the Provation  report the doctor has written and discharge instructions have been discussed with the patient and responsible adult.  All questions were addressed and answered prior to patient discharge.      Understanding Colon and Rectal Polyps     The colon has a smooth lining composed of millions of cells.     The colon (also called the large intestine) is a muscular tube that forms the last part of the digestive tract. It absorbs water and stores food waste. The colon is about 4 to 6 feet long. The rectum is the last 6 inches of the colon. The colon and rectum have a smooth lining composed of millions of cells. Changes in these cells can lead to growths in the colon that can become cancerous and should be removed.     When the Colon Lining Changes  Changes that occur in the cells that line the colon or rectum can lead to growths called polyps. Over a period of years, polyps can turn cancerous. Removing polyps early may prevent cancer from ever forming.      Polyps  Polyps are fleshy clumps of tissue that form on the lining of the colon or rectum. Small polyps are usually benign (not cancerous). However, over time, cells in a polyp can change and become cancerous. The larger a polyp grows, the more likely this is to happen. Also, certain types of polyps known as adenomatous polyps are considered premalignant. This means that they will almost always become cancerous if they re not removed.          Cancer  Almost all colorectal cancers start when polyp cells begin growing abnormally. As a cancerous tumor grows, it may involve more and more of the colon or rectum. In time, cancer can also grow beyond the colon or rectum and spread to nearby organs or to glands called lymph nodes. The cells can also travel to other parts of the body. This is known as metastasis. The earlier a cancerous tumor is removed, the better the chance of preventing its spread.        [id number] Juan Carlos  [employer], [address] [place] [address], [place] [address]. All rights reserved. This information is not intended as a substitute for professional medical care. Always follow your healthcare professional's instructions.

## 2021-09-14 LAB
PATH REPORT.COMMENTS IMP SPEC: NORMAL
PATH REPORT.COMMENTS IMP SPEC: NORMAL
PATH REPORT.FINAL DX SPEC: NORMAL
PATH REPORT.GROSS SPEC: NORMAL
PATH REPORT.MICROSCOPIC SPEC OTHER STN: NORMAL
PHOTO IMAGE: NORMAL

## 2021-09-14 PROCEDURE — 88305 TISSUE EXAM BY PATHOLOGIST: CPT | Mod: 26 | Performed by: PATHOLOGY

## 2021-10-24 ENCOUNTER — HEALTH MAINTENANCE LETTER (OUTPATIENT)
Age: 61
End: 2021-10-24

## 2021-11-24 DIAGNOSIS — K21.9 GASTROESOPHAGEAL REFLUX DISEASE WITHOUT ESOPHAGITIS: ICD-10-CM

## 2021-11-24 RX ORDER — OMEPRAZOLE 40 MG/1
CAPSULE, DELAYED RELEASE ORAL
Qty: 90 CAPSULE | Refills: 2 | Status: SHIPPED | OUTPATIENT
Start: 2021-11-24 | End: 2022-05-23

## 2022-02-13 ENCOUNTER — HEALTH MAINTENANCE LETTER (OUTPATIENT)
Age: 62
End: 2022-02-13

## 2022-05-23 ENCOUNTER — TRANSFERRED RECORDS (OUTPATIENT)
Dept: HEALTH INFORMATION MANAGEMENT | Facility: CLINIC | Age: 62
End: 2022-05-23

## 2022-05-23 ENCOUNTER — OFFICE VISIT (OUTPATIENT)
Dept: FAMILY MEDICINE | Facility: CLINIC | Age: 62
End: 2022-05-23

## 2022-05-23 VITALS
OXYGEN SATURATION: 98 % | WEIGHT: 226.7 LBS | TEMPERATURE: 97.7 F | SYSTOLIC BLOOD PRESSURE: 121 MMHG | RESPIRATION RATE: 16 BRPM | HEIGHT: 62 IN | HEART RATE: 68 BPM | DIASTOLIC BLOOD PRESSURE: 72 MMHG | BODY MASS INDEX: 41.72 KG/M2

## 2022-05-23 DIAGNOSIS — E03.9 HYPOTHYROIDISM, UNSPECIFIED TYPE: Primary | ICD-10-CM

## 2022-05-23 DIAGNOSIS — Z12.11 SPECIAL SCREENING FOR MALIGNANT NEOPLASMS, COLON: ICD-10-CM

## 2022-05-23 DIAGNOSIS — E78.5 HYPERLIPIDEMIA LDL GOAL <160: ICD-10-CM

## 2022-05-23 DIAGNOSIS — Z87.891 PERSONAL HISTORY OF TOBACCO USE: ICD-10-CM

## 2022-05-23 DIAGNOSIS — Z11.4 SCREENING FOR HIV (HUMAN IMMUNODEFICIENCY VIRUS): ICD-10-CM

## 2022-05-23 DIAGNOSIS — Z12.31 ENCOUNTER FOR SCREENING MAMMOGRAM FOR BREAST CANCER: ICD-10-CM

## 2022-05-23 DIAGNOSIS — K21.9 GASTROESOPHAGEAL REFLUX DISEASE WITHOUT ESOPHAGITIS: ICD-10-CM

## 2022-05-23 DIAGNOSIS — R73.01 IFG (IMPAIRED FASTING GLUCOSE): ICD-10-CM

## 2022-05-23 LAB
ALBUMIN SERPL-MCNC: 3.8 G/DL (ref 3.4–5)
ALP SERPL-CCNC: 89 U/L (ref 40–150)
ALT SERPL W P-5'-P-CCNC: 32 U/L (ref 0–50)
ANION GAP SERPL CALCULATED.3IONS-SCNC: 4 MMOL/L (ref 3–14)
AST SERPL W P-5'-P-CCNC: 15 U/L (ref 0–45)
BILIRUB SERPL-MCNC: 0.6 MG/DL (ref 0.2–1.3)
BUN SERPL-MCNC: 14 MG/DL (ref 7–30)
CALCIUM SERPL-MCNC: 9.2 MG/DL (ref 8.5–10.1)
CHLORIDE BLD-SCNC: 108 MMOL/L (ref 94–109)
CHOLEST SERPL-MCNC: 233 MG/DL
CO2 SERPL-SCNC: 29 MMOL/L (ref 20–32)
CREAT SERPL-MCNC: 0.77 MG/DL (ref 0.52–1.04)
ERYTHROCYTE [DISTWIDTH] IN BLOOD BY AUTOMATED COUNT: 12.2 % (ref 10–15)
FASTING STATUS PATIENT QL REPORTED: YES
GFR SERPL CREATININE-BSD FRML MDRD: 87 ML/MIN/1.73M2
GLUCOSE BLD-MCNC: 95 MG/DL (ref 70–99)
HCT VFR BLD AUTO: 42.5 % (ref 35–47)
HDLC SERPL-MCNC: 51 MG/DL
HGB BLD-MCNC: 13.9 G/DL (ref 11.7–15.7)
LDLC SERPL CALC-MCNC: 160 MG/DL
MCH RBC QN AUTO: 30.3 PG (ref 26.5–33)
MCHC RBC AUTO-ENTMCNC: 32.7 G/DL (ref 31.5–36.5)
MCV RBC AUTO: 93 FL (ref 78–100)
NONHDLC SERPL-MCNC: 182 MG/DL
PLATELET # BLD AUTO: 221 10E3/UL (ref 150–450)
POTASSIUM BLD-SCNC: 4.2 MMOL/L (ref 3.4–5.3)
PROT SERPL-MCNC: 7.3 G/DL (ref 6.8–8.8)
RBC # BLD AUTO: 4.58 10E6/UL (ref 3.8–5.2)
SODIUM SERPL-SCNC: 141 MMOL/L (ref 133–144)
TRIGL SERPL-MCNC: 109 MG/DL
WBC # BLD AUTO: 6.4 10E3/UL (ref 4–11)

## 2022-05-23 PROCEDURE — 36415 COLL VENOUS BLD VENIPUNCTURE: CPT | Performed by: INTERNAL MEDICINE

## 2022-05-23 PROCEDURE — 99213 OFFICE O/P EST LOW 20 MIN: CPT | Performed by: INTERNAL MEDICINE

## 2022-05-23 PROCEDURE — 80053 COMPREHEN METABOLIC PANEL: CPT | Performed by: INTERNAL MEDICINE

## 2022-05-23 PROCEDURE — 80061 LIPID PANEL: CPT | Performed by: INTERNAL MEDICINE

## 2022-05-23 PROCEDURE — G0296 VISIT TO DETERM LDCT ELIG: HCPCS | Performed by: INTERNAL MEDICINE

## 2022-05-23 PROCEDURE — 85027 COMPLETE CBC AUTOMATED: CPT | Performed by: INTERNAL MEDICINE

## 2022-05-23 RX ORDER — ESOMEPRAZOLE MAGNESIUM 40 MG/1
40 CAPSULE, DELAYED RELEASE ORAL 2 TIMES DAILY
Qty: 180 CAPSULE | Refills: 3 | Status: SHIPPED | OUTPATIENT
Start: 2022-05-23

## 2022-05-23 ASSESSMENT — PAIN SCALES - GENERAL: PAINLEVEL: NO PAIN (0)

## 2022-05-23 NOTE — PATIENT INSTRUCTIONS
(E03.9) Hypothyroidism, unspecified type  (primary encounter diagnosis)  Comment: We will plan to follow up in endocrinology later today  Plan: Comprehensive metabolic panel, Lipid panel         reflex to direct LDL Fasting          (E78.5) Hyperlipidemia LDL goal <160  Comment: We will check fasting lipid panel and continue lower fat, high fiber diet and continue to exercise  Plan: Comprehensive metabolic panel, Lipid panel         reflex to direct LDL Fasting            (R73.01) IFG (impaired fasting glucose)  Comment: check blood glucose today with endocrinology   Plan: Comprehensive metabolic panel, Lipid panel         reflex to direct LDL Fasting     [facility] (also performs diagnostic mammogram, ultrasound and biopsy) [phone].             (K21.9) Gastroesophageal reflux disease without esophagitis  Comment: Recommend adjusting medications to be more aggressive with gastroesophageal reflux treatment and start trial of nexium 40 mg twice per day x 2 weeks then reduce to once per day.  OK to also take famotidine (pepcid) as needed.  Could repeat endoscopy  Plan: esomeprazole (NEXIUM) 40 MG DR capsule, CBC         with platelets          Lung cancer Screening  Comment: recommend lung caner screening  [facility] phone #[phone]

## 2022-05-23 NOTE — PROGRESS NOTES
"      Shayan Adhikari is a 62 year old who presents for the following health issues     HPI     Chief Complaint   Patient presents with     Recheck Medication          Abdominal Bloating   Onofre Robertson returns accompanied by her  for routine follow-up.  She has had episodes of feeling nauseas  with abdominal bloating and cramping within 4 hours after eating dinner.  Notes that may be food allergy.  She has followed up with gastroenterology for this issue and has attempting to manage with a variety of strategies.  She did try eating a more bland diet and this did not affect her symtpoms.  She notes that she did have gastroesophageal reflux that at times can be very bad in the evening.  For this condition she is taking omeprazole 40 mg once daily and tums as needed, but this has not helped her abdominal bloating.  She did have a normal upper endoscopy in 2018.  She has had CT abdomen, but showed no concerning findings.  Screening for HIV (human immunodeficiency virus)  Hypothyroidism, unspecified type   She is following closely with endocrinology for this that she continues on levothyroxine  Hyperlipidemia LDL goal <160   She has not wished to take a statin at this time, and it should be noted that her 10-year risk score is less than 7.5%.  IFG (impaired fasting glucose)   Her diet has been improving and gradually over the past 2 years she has had some noticeable weight loss.    Review of Systems   Constitutional, HEENT, cardiovascular, pulmonary, GI - as above, , musculoskeletal, neuro, skin, endocrine and psych systems are negative, except as otherwise noted.      Objective    /72 (BP Location: Right arm, Patient Position: Sitting, Cuff Size: Adult Large)   Pulse 68   Temp 97.7  F (36.5  C) (Temporal)   Resp 16   Ht 1.575 m (5' 2\")   LMP 02/20/2015   SpO2 98%   BMI 42.43 kg/m    Body mass index is 42.43 kg/m .  Physical Exam   GENERAL: healthy, alert and no distress  EYES: Eyes grossly " normal to inspection,   NECK: no adenopathy, no asymmetry   RESP: lungs clear to auscultation - no rales, rhonchi or wheezes  CV: regular rate and rhythm, normal S1 S2, no S3 or S4, no murmur, click or rub, no peripheral edema   ABDOMEN: Obese, soft, nontender, no hepatosplenomegaly, no masses    MS: no gross musculoskeletal defects noted, no edema  SKIN: no suspicious lesions or rashes  NEURO: Normal strength and tone, mentation intact and speech normal  PSYCH: mentation appears normal, affect normal/bright    Recent Results (from the past 240 hour(s))   Comprehensive metabolic panel    Collection Time: 05/23/22 11:43 AM   Result Value Ref Range    Sodium 141 133 - 144 mmol/L    Potassium 4.2 3.4 - 5.3 mmol/L    Chloride 108 94 - 109 mmol/L    Carbon Dioxide (CO2) 29 20 - 32 mmol/L    Anion Gap 4 3 - 14 mmol/L    Urea Nitrogen 14 7 - 30 mg/dL    Creatinine 0.77 0.52 - 1.04 mg/dL    Calcium 9.2 8.5 - 10.1 mg/dL    Glucose 95 70 - 99 mg/dL    Alkaline Phosphatase 89 40 - 150 U/L    AST 15 0 - 45 U/L    ALT 32 0 - 50 U/L    Protein Total 7.3 6.8 - 8.8 g/dL    Albumin 3.8 3.4 - 5.0 g/dL    Bilirubin Total 0.6 0.2 - 1.3 mg/dL    GFR Estimate 87 >60 mL/min/1.73m2   Lipid panel reflex to direct LDL Fasting    Collection Time: 05/23/22 11:43 AM   Result Value Ref Range    Cholesterol 233 (H) <200 mg/dL    Triglycerides 109 <150 mg/dL    Direct Measure HDL 51 >=50 mg/dL    LDL Cholesterol Calculated 160 (H) <=100 mg/dL    Non HDL Cholesterol 182 (H) <130 mg/dL    Patient Fasting > 8hrs? Yes    CBC with platelets    Collection Time: 05/23/22 11:43 AM   Result Value Ref Range    WBC Count 6.4 4.0 - 11.0 10e3/uL    RBC Count 4.58 3.80 - 5.20 10e6/uL    Hemoglobin 13.9 11.7 - 15.7 g/dL    Hematocrit 42.5 35.0 - 47.0 %    MCV 93 78 - 100 fL    MCH 30.3 26.5 - 33.0 pg    MCHC 32.7 31.5 - 36.5 g/dL    RDW 12.2 10.0 - 15.0 %    Platelet Count 221 150 - 450 10e3/uL           Patient Instructions   (E03.9) Hypothyroidism, unspecified  type  (primary encounter diagnosis)  Comment: We will plan to follow up in endocrinology later today  Plan: Comprehensive metabolic panel, Lipid panel         reflex to direct LDL Fasting          (E78.5) Hyperlipidemia LDL goal <160  Comment: We will check fasting lipid panel and continue lower fat, high fiber diet and continue to exercise  Plan: Comprehensive metabolic panel, Lipid panel         reflex to direct LDL Fasting            (R73.01) IFG (impaired fasting glucose)  Comment: check blood glucose today with endocrinology   Plan: Comprehensive metabolic panel, Lipid panel         reflex to direct LDL Fasting     [facility] (also performs diagnostic mammogram, ultrasound and biopsy) [phone].             (K21.9) Gastroesophageal reflux disease without esophagitis  Comment: Recommend adjusting medications to be more aggressive with gastroesophageal reflux treatment and start trial of nexium 40 mg twice per day x 2 weeks then reduce to once per day.  OK to also take famotidine (pepcid) as needed.  Could repeat endoscopy  Plan: esomeprazole (NEXIUM) 40 MG DR capsule, CBC         with platelets          Lung cancer Screening  Comment: recommend lung caner screening  [facility] phone #[phone]            Lung Cancer Screening Shared Decision Making Visit     Onofre Robertson, a 62 year old female, is eligible for lung cancer screening    History   Smoking Status     Former Smoker     Packs/day: 1.00     Years: 40.00     Types: Hookah     Quit date: 1/15/2011   Smokeless Tobacco     Current User     Comment: e-cigarettes        I have discussed with patient the risks and benefits of screening for lung cancer with low-dose CT.     The risks include:    radiation exposure: one low dose chest CT has as much ionizing radiation as about 15 chest x-rays, or 6 months of background radiation living in [place]      false positives: most findings/nodules are NOT cancer, but some might  still require additional diagnostic evaluation, including biopsy    over-diagnosis: some slow growing cancers that might never have been clinically significant will be detected and treated unnecessarily     The benefit of early detection of lung cancer is contingent upon adherence to annual screening or more frequent follow up if indicated.     Furthermore, to benefit from screening, Onofre must be willing and able to undergo diagnostic procedures, if indicated. Although no specific guide is available for determining severity of comorbidities, it is reasonable to withhold screening in patients who have greater mortality risk from other diseases.     We did discuss that the best way to prevent lung cancer is to not smoke.    Some patients may value a numeric estimation of lung cancer risk when evaluating if lung cancer screening is right for them, here is one calculator:    ShouldIScreen

## 2022-05-24 NOTE — RESULT ENCOUNTER NOTE
Osman Adhikari,    I had the opportunity to review your recent labs and a summary of your labs reads as follows:    Your complete blood counts show no sign of anemia, normal white blood cell count and platelets.  Your comprehensive metabolic panel showed normal renal function, normal liver function, and normal fasting blood glucose indicating no evidence of diabetes mellitus.  Your fasting lipid panel show  -Improved HDL (good) cholesterol -as your goal is greater than 40  -Slight improvement of your LDL (bad) cholesterol as your goal is less than 160    -Reduced triglyceride levels    The 10-year ASCVD risk score ([place] DC Jr., et al., 2013) is: 3.8%    Values used to calculate the score:      Age: 62 years      Sex: Female      Is Non- : No      Diabetic: No      Tobacco smoker: No      Systolic Blood Pressure: 121 mmHg      Is BP treated: No      HDL Cholesterol: 51 mg/dL      Total Cholesterol: 233 mg/dL      Congratulations on your excellent results        Sincerely,  Jacob Nicole MD

## 2022-06-20 ENCOUNTER — ANCILLARY PROCEDURE (OUTPATIENT)
Dept: MAMMOGRAPHY | Facility: CLINIC | Age: 62
End: 2022-06-20
Attending: INTERNAL MEDICINE
Payer: COMMERCIAL

## 2022-06-20 DIAGNOSIS — Z12.31 ENCOUNTER FOR SCREENING MAMMOGRAM FOR BREAST CANCER: ICD-10-CM

## 2022-06-20 PROCEDURE — 77063 BREAST TOMOSYNTHESIS BI: CPT | Mod: TC | Performed by: RADIOLOGY

## 2022-06-20 PROCEDURE — 77067 SCR MAMMO BI INCL CAD: CPT | Mod: TC | Performed by: RADIOLOGY

## 2022-08-17 ENCOUNTER — TELEPHONE (OUTPATIENT)
Dept: FAMILY MEDICINE | Facility: CLINIC | Age: 62
End: 2022-08-17

## 2022-08-17 NOTE — TELEPHONE ENCOUNTER
Patient Quality Outreach    Patient is due for the following:   Cervical Cancer Screening - PAP Needed    Next Steps:   Needs to schedule an appointment     Type of outreach:    Sent [email or web address] message.      Questions for provider review:    None     Verona Betancourt, CMA

## 2022-10-14 ENCOUNTER — NURSE TRIAGE (OUTPATIENT)
Dept: FAMILY MEDICINE | Facility: CLINIC | Age: 62
End: 2022-10-14

## 2022-10-14 DIAGNOSIS — H10.33 ACUTE BACTERIAL CONJUNCTIVITIS OF BOTH EYES: Primary | ICD-10-CM

## 2022-10-14 RX ORDER — POLYMYXIN B SULFATE AND TRIMETHOPRIM 1; 10000 MG/ML; [USP'U]/ML
1-2 SOLUTION OPHTHALMIC EVERY 4 HOURS
Qty: 10 ML | Refills: 0 | Status: SHIPPED | OUTPATIENT
Start: 2022-10-14

## 2022-10-14 NOTE — TELEPHONE ENCOUNTER
"CC: Patient calling stating she is concerned that she has pink eye    LOCATION: sclera red, right side more severe   EYE DISCHARGE: both eyes feel \"crusty\" denies pus   REDNESS OF SCLERA: yes, right eye more severe   ONSET: yesterday   EYELIDS: denies swelling or redness to lids   VISION: denies changes   PAIN: denies  CONTACT LENS: does, not currently wearing   OTHER SYMPTOMS: denies fever, runny nose, cough. Denies any cold symptoms    Patient states she was exposed to her granddaughter who has pink eye and being treated with antibiotic drops.     Protocol advises home care, although please advise if treatment would be advised due to exposure to pink eye? Or does patient need to be seen?    Patient is currently traveling out of town, wondering if she should go to local walk in clinic if needing to be seen? or if she can get rx?    Preferred pharmacy:  [facility]   [address] [address]    Callback [phone] - no voicemail set up     Carmencita Riley RN  [facility]    Reason for Disposition    Redness of white of eye (sclera), but no pus or only a small amount of brief pus    Red eye is part of a cold, and no eye pain or blurred vision    Additional Information    Negative: Eye exposure to chemical or fumes    Negative: Chemical got in the eye    Negative: Piece of something got in the eye    Negative: Followed an eye injury    Negative: Yellow or green pus in the eyes    Negative: SEVERE eye pain    Negative: Recent eye surgery and has increasing eye pain    Negative: Patient sounds very sick or weak to the triager    Negative: MODERATE eye pain or discomfort (e.g., interferes with normal activities or awakens from sleep; more than mild)    Negative: Looking at light causes MODERATE to SEVERE eye pain (i.e., photophobia)    Negative: New or worsening blurred vision    Negative: Cloudy spot or sore seen on the cornea (clear part of the eye)    Negative: Eyelids " are very swollen (shut or almost)    Negative: Vomiting    Negative: Eyelid (outer) is very red    Negative: Foreign body sensation ('feels like something is in there')    Negative: Patient wants to be seen    Negative: Eye pain present > 24 hours    Negative: Bleeding on white of the eye and is taking Coumadin or known bleeding disorder (e.g., thrombocytopenia)    Negative: Only 1 eye is red, and persists > 48 hours    Negative: Red eyes present > 7 days    Negative: Bleeding on white of the eye    Negative: Red eye caused by sunscreen, smoke, smog, chlorine, food, soap or other mild irritant    Negative: Red eye caused by contact lens, and no eye pain or blurred vision    Protocols used: EYE - PUS OR [id number], EYE - RED WITHOUT PUS-A-OH

## 2022-10-14 NOTE — TELEPHONE ENCOUNTER
Writer called patient and notified of script sent to requested pharmacy per PCP below.    Patient will follow up with pharmacy.    No further questions/concerns at this time.    Carmencita Riley RN  [facility]

## 2022-10-16 ENCOUNTER — HEALTH MAINTENANCE LETTER (OUTPATIENT)
Age: 62
End: 2022-10-16

## 2022-10-17 ENCOUNTER — MYC MEDICAL ADVICE (OUTPATIENT)
Dept: FAMILY MEDICINE | Facility: CLINIC | Age: 62
End: 2022-10-17

## 2022-10-17 ENCOUNTER — NURSE TRIAGE (OUTPATIENT)
Dept: FAMILY MEDICINE | Facility: CLINIC | Age: 62
End: 2022-10-17

## 2022-10-17 DIAGNOSIS — G47.33 OBSTRUCTIVE SLEEP APNEA SYNDROME: Primary | ICD-10-CM

## 2022-10-17 NOTE — TELEPHONE ENCOUNTER
Nurse Triage SBAR    Is this a 2nd Level Triage? NO    Situation:   Pt called c/o possible paralyzing sleep apnea and short episode of inability to open mouth and take a breath last night.    Pt stating it  felt like nose was completely closed off for about 2 seconds and felt like she couldn't move. Pt felt panicked and unable to take a breath. Pt states this has been the only episode.Pt would like providers recommendations on what to do next. Requesting a referral for a home sleep study and any other recommendations.      Pt also sent  today for review:  Last night it happened again but I woke up unable to breath and couldn t open my mouth to catch a breath. That too was a few secs. When I was finally able to breath I was out of breath. I believe I may have some sort of sleep apnea but not sure what you can do. I had been referred to a sleep disorder some time ago when I was considering weight loss surgery and my insurance carrier did not approve overnight analysis and the sleep disorder felt they would not be able to get a proper analysis with an in home machine. Is there something I can do at home or do I need to make an appointment?  If I need an appointment can you refer me. I am afraid to fall asleep.       Protocol Recommended Disposition:   No protocol     Recommendation: Please advise on recommendations. Pt requesting home sleep study.    Ok for triage to leave detailed VM on callback     Routed to provider    Does the patient meet one of the following criteria for ADS visit consideration? 16+ years old, with an [facility] PCP     TIP  Providers, please consider if this condition is appropriate for management at one of our Acute and Diagnostic Services sites.     If patient is a good candidate, please use dotphrase <dot>triageresponse and select Refer to ADS to document.      Sherita GAVIN RN  EP Triage

## 2022-10-18 NOTE — TELEPHONE ENCOUNTER
Dr. Nicole,    Please review patient's MyChart message.    Whitley ORTEGA MA on 10/18/2022 at 11:20 AM

## 2023-03-26 ENCOUNTER — HEALTH MAINTENANCE LETTER (OUTPATIENT)
Age: 63
End: 2023-03-26

## 2023-07-18 ENCOUNTER — TELEPHONE (OUTPATIENT)
Dept: FAMILY MEDICINE | Facility: CLINIC | Age: 63
End: 2023-07-18

## 2023-07-18 DIAGNOSIS — Z12.31 ENCOUNTER FOR SCREENING MAMMOGRAM FOR BREAST CANCER: Primary | ICD-10-CM

## 2023-08-03 ENCOUNTER — OFFICE VISIT (OUTPATIENT)
Dept: FAMILY MEDICINE | Facility: CLINIC | Age: 63
End: 2023-08-03
Payer: COMMERCIAL

## 2023-08-03 DIAGNOSIS — Z85.828 HISTORY OF NONMELANOMA SKIN CANCER: ICD-10-CM

## 2023-08-03 DIAGNOSIS — L81.4 LENTIGINES: ICD-10-CM

## 2023-08-03 DIAGNOSIS — L82.1 SEBORRHEIC KERATOSES: ICD-10-CM

## 2023-08-03 DIAGNOSIS — D22.9 MULTIPLE BENIGN NEVI: ICD-10-CM

## 2023-08-03 DIAGNOSIS — R23.8 INFLAMMATORY PAPULE: Primary | ICD-10-CM

## 2023-08-03 DIAGNOSIS — D18.01 CHERRY ANGIOMA: ICD-10-CM

## 2023-08-03 PROCEDURE — 99213 OFFICE O/P EST LOW 20 MIN: CPT | Performed by: PHYSICIAN ASSISTANT

## 2023-08-03 NOTE — PROGRESS NOTES
[facility] Dermatology Note  Encounter Date: Aug 3, 2023  Office Visit      Dermatology Problem List:  1. History of NMSC  -BCC on the left side of the nose, s/p MMS 12/6/2018  2. AKs  - s/p cryo  3. DF - right upper arm    Social: .  ____________________________________________    Assessment & Plan:  # History of nonmelanoma skin cancer, no clincial evidence of recurrence.   - Sunscreen: Apply 20 minutes prior to going outdoors and reapply every two hours, when wet or sweating. We recommend using an SPF 30 or higher, and to use one that is water resistant.     - Advised to monitor for changing, non-healing, bleeding, painful, changing, or otherwise symptomatic lesions  - Continue annual skin exams    # Benign findings: multiple benign nevi, lentigines, cherry angiomas, SKs  - edu on benign etiology  - Signs and Symptoms of non-melanoma skin cancer and ABCDEs of melanoma reviewed with patient. Patient encouraged to perform monthly self skin exams and educated on how to perform them. UV precautions reviewed with patient. Patient was asked about new or changing moles/lesions on body.   - Sunscreen: Apply 20 minutes prior to going outdoors and reapply every two hours, when wet or sweating. We recommend using an SPF 30 or higher, and to use one that is water resistant.     - RTC for changes    # Inflammatory papule - L upper lip - ddx: acne vs hsv (less likely) - in process of resolving  - RTC if does not resolve     Procedures Performed:   none    Follow-up: 1 year(s) in-person, or earlier for new or changing lesions    Staff:     All risks, benefits and alternatives were discussed with patient.  Patient is in agreement and understands the assessment and plan.  All questions were answered.    Kirsty Saavedra PA-C, MPAS  [facility]: Phone: [phone], Fax: [phone]  [facility]: Phone: [phone],   Fax: [phone]  [facility]: Phone: [phone], Fax: [phone]  ____________________________________________    CC: Skin Check (FBSC, concerned about spot on neck and lip)      HPI:  Ms. Onofre Robertson is a 63 year old female who presents today as a return patient for FBSE. Hx NMSC and AKs in the past. Last seen by myself in 2021. Has a few irritated spots on the sada today which initiated in the past 5-7 days. Now appears to be getting better.     Patient is otherwise feeling well, without additional concerns.    Labs:  none    Physical Exam:  Vitals: LMP 02/20/2015   SKIN: Full skin, which includes the head/face, both arms, chest, back, abdomen,both legs, genitalia and/or groin buttocks, digits and/or nails, was examined.   - Roemro's skin type II, <100 nevi  - There are dome shaped bright red papules on the trunk.   - Multiple regular brown pigmented macules and papules are identified on the trunk and extremities.   - Scattered brown macules on sun exposed areas.  - There are waxy stuck on tan to brown papules on the trunk.   - There is no erythema, telangectasias, nodularity, or pigmentation on the R nose..   - inflammatory papule on the L lip and R chin and on the L cheek  - No other lesions of concern on areas examined.     Medications:  Current Outpatient Medications   Medication    cyanocobalamin (VITAMIN B-12) 100 MCG tablet    esomeprazole (NEXIUM) 40 MG DR capsule    lidocaine (LIDODERM) 5 % patch    levothyroxine (SYNTHROID/LEVOTHROID) 112 MCG tablet    trimethoprim-polymyxin b (POLYTRIM) 44760-6.1 UNIT/ML-% ophthalmic solution     Current Facility-Administered Medications   Medication    lidocaine 1% with EPINEPHrine 1:100,000 injection 3 mL    lidocaine 1% with EPINEPHrine 1:100,000 injection 3 mL      Past Medical/Surgical History:   Patient Active Problem List   Diagnosis    Obesity    Hypothyroidism    Hyperlipidemia LDL goal <160    Osteoarthrosis involving  multiple sites    Family history of malignant neoplasm of breast    Liver hemangioma    Esophageal reflux    Abdominal pain, generalized    IFG (impaired fasting glucose)    Lower back pain    Morbid obesity (H)     Past Medical History:   Diagnosis Date    Arthritis     Esophageal reflux     H pylori ulcer     Hypercholesterolemia     Hypothyroidism     Lyme disease     Thyroid cancer 1980    Tobacco use disorder 6/7/2003

## 2023-08-03 NOTE — PATIENT INSTRUCTIONS
Patient Education       Proper skin care from [facility]:    -Eliminate harsh soaps as they strip the natural oils from the skin, often resulting in dry itchy skin ( i.e. Dial, Zest, Kiswahili Spring)  -Use mild soaps such as Cetaphil or Dove Sensitive Skin in the shower. You do not need to use soap on arms, legs, and trunk every time you shower unless visibly soiled.   -Avoid hot or cold showers.  -After showering, lightly dry off and apply moisturizing within 2-3 minutes. This will help trap moisture in the skin.   -Aggressive use of a moisturizer at least 1-2 times a day to the entire body (including -Vanicream, Cetaphil, Aquaphor or Cerave) and moisturize hands after every washing.  -We recommend using moisturizers that come in a tub that needs to be scooped out, not a pump. This has more of an oil base. It will hold moisture in your skin much better than a water base moisturizer. The above recommended are non-pore clogging.      Wear a sunscreen with at least SPF 30 on your face, ears, neck and V of the chest daily. Wear sunscreen on other areas of the body if those areas are exposed to the sun throughout the day. Sunscreens can contain physical and/or chemical blockers. Physical blockers are less likely to clog pores, these include zinc oxide and titanium dioxide. Reapply every two hour and after swimming.     Sunscreen examples: https://www.ewg.org/sunscreen/    UV radiation  UVA radiation remains constant throughout the day and throughout the year. It is a longer wavelength than UVB and therefore penetrates deeper into the skin leading to immediate and delayed tanning, photoaging, and skin cancer. 70-80% of UVA and UVB radiation occurs between the hours of 10am-2pm.  UVB radiation  UVB radiation causes the most harmful effects and is more significant during the summer months. However, snow and ice can reflect UVB radiation leading to skin damage during the winter months as well. UVB radiation is  responsible for tanning, burning, inflammation, delayed erythema (pinkness), pigmentation (brown spots), and skin cancer.     I recommend self monthly full body exams and yearly full body exams with a dermatology provider. If you develop a new or changing lesion please follow up for examination. Most skin cancers are pink and scaly or pink and pearly. However, we do see blue/brown/black skin cancers.  Consider the ABCDEs of melanoma when giving yourself your monthly full body exam ( don't forget the groin, buttocks, feet, toes, etc). A-asymmetry, B-borders, C-color, D-diameter, E-elevation or evolving. If you see any of these changes please follow up in clinic. If you cannot see your back I recommend purchasing a hand held mirror to use with a larger wall mirror.       Checking for Skin Cancer  You can find cancer early by checking your skin each month. There are 3 kinds of skin cancer. They are melanoma, basal cell carcinoma, and squamous cell carcinoma. Doing monthly skin checks is the best way to find new marks or skin changes. Follow the instructions below for checking your skin.   The ABCDEs of checking moles for melanoma   Check your moles or growths for signs of melanoma using ABCDE:   Asymmetry: the sides of the mole or growth don t match  Border: the edges are ragged, notched, or blurred  Color: the color within the mole or growth varies  Diameter: the mole or growth is larger than 6 mm (size of a pencil eraser)  Evolving: the size, shape, or color of the mole or growth is changing (evolving is not shown in the images below)    Checking for other types of skin cancer  Basal cell carcinoma or squamous cell carcinoma have symptoms such as:     A spot or mole that looks different from all other marks on your skin  Changes in how an area feels, such as itching, tenderness, or pain  Changes in the skin's surface, such as oozing, bleeding, or scaliness  A sore that does not heal  New swelling or redness beyond  the border of a mole    Who s at risk?  Anyone can get skin cancer. But you are at greater risk if you have:   Fair skin, light-colored hair, or light-colored eyes  Many moles or abnormal moles on your skin  A history of sunburns from sunlight or tanning beds  A family history of skin cancer  A history of exposure to radiation or chemicals  A weakened immune system  If you have had skin cancer in the past, you are at risk for recurring skin cancer.   How to check your skin  Do your monthly skin checkups in front of a full-length mirror. Check all parts of your body, including your:   Head (ears, face, neck, and scalp)  Torso (front, back, and sides)  Arms (tops, undersides, upper, and lower armpits)  Hands (palms, backs, and fingers, including under the nails)  Buttocks and genitals  Legs (front, back, and sides)  Feet (tops, soles, toes, including under the nails, and between toes)  If you have a lot of moles, take digital photos of them each month. Make sure to take photos both up close and from a distance. These can help you see if any moles change over time.   Most skin changes are not cancer. But if you see any changes in your skin, call your doctor right away. Only he or she can diagnose a problem. If you have skin cancer, seeing your doctor can be the first step toward getting the treatment that could save your life.   [employer] last reviewed this educational content on 4/1/2019 2000-2020 [employer]. [address] [place] [address], [place] [address]. All rights reserved. This information is not intended as a substitute for professional medical care. Always follow your healthcare professional's instructions.       When should I call my doctor?  If you are worsening or not improving, please, contact us or seek urgent care as noted below.     Who should I call with questions (adults)?  [facility] (adult and pediatric): [phone]  [facility]  [place] (adult): [phone]  [facility] ([place], [place], [place] and [place]) [phone]  For urgent needs outside of business hours call the [facility] at [phone] and ask for the dermatology resident on call to be paged  If this is a medical emergency and you are unable to reach an ER, Call 911      If you need a prescription refill, please contact your pharmacy. Refills are approved or denied by our Physicians during normal business hours, Monday through Fridays  Per office policy, refills will not be granted if you have not been seen within the past year (or sooner depending on your child's condition)

## 2023-08-03 NOTE — LETTER
8/3/2023         RE: Onofre Robertson  [address]  [place] [address]        Dear Colleague,    Thank you for referring your patient, Onofre Robertson, to the [facility]. Please see a copy of my visit note below.    [facility] Dermatology Note  Encounter Date: Aug 3, 2023  Office Visit      Dermatology Problem List:  1. History of NMSC  -BCC on the left side of the nose, s/p MMS 12/6/2018  2. AKs  - s/p cryo  3. DF - right upper arm    Social: .  ____________________________________________    Assessment & Plan:  # History of nonmelanoma skin cancer, no clincial evidence of recurrence.   - Sunscreen: Apply 20 minutes prior to going outdoors and reapply every two hours, when wet or sweating. We recommend using an SPF 30 or higher, and to use one that is water resistant.     - Advised to monitor for changing, non-healing, bleeding, painful, changing, or otherwise symptomatic lesions  - Continue annual skin exams    # Benign findings: multiple benign nevi, lentigines, cherry angiomas, SKs  - edu on benign etiology  - Signs and Symptoms of non-melanoma skin cancer and ABCDEs of melanoma reviewed with patient. Patient encouraged to perform monthly self skin exams and educated on how to perform them. UV precautions reviewed with patient. Patient was asked about new or changing moles/lesions on body.   - Sunscreen: Apply 20 minutes prior to going outdoors and reapply every two hours, when wet or sweating. We recommend using an SPF 30 or higher, and to use one that is water resistant.     - RTC for changes    # Inflammatory papule - L upper lip - ddx: acne vs hsv (less likely) - in process of resolving  - RTC if does not resolve     Procedures Performed:   none    Follow-up: 1 year(s) in-person, or earlier for new or changing lesions    Staff:     All risks, benefits and alternatives were discussed with patient.  Patient is in agreement and  understands the assessment and plan.  All questions were answered.    Kirsty Saavedra PA-C, MPAS  [facility]: Phone: [phone], Fax: [phone]  [facility]: Phone: [phone],  Fax: [phone]  [facility]: Phone: [phone], Fax: [phone]  ____________________________________________    CC: Skin Check (FBSC, concerned about spot on neck and lip)      HPI:  Ms. Onofre Robertson is a 63 year old female who presents today as a return patient for FBSE. Hx NMSC and AKs in the past. Last seen by myself in 2021. Has a few irritated spots on the sada today which initiated in the past 5-7 days. Now appears to be getting better.     Patient is otherwise feeling well, without additional concerns.    Labs:  none    Physical Exam:  Vitals: LMP 02/20/2015   SKIN: Full skin, which includes the head/face, both arms, chest, back, abdomen,both legs, genitalia and/or groin buttocks, digits and/or nails, was examined.   - Romero's skin type II, <100 nevi  - There are dome shaped bright red papules on the trunk.   - Multiple regular brown pigmented macules and papules are identified on the trunk and extremities.   - Scattered brown macules on sun exposed areas.  - There are waxy stuck on tan to brown papules on the trunk.   - There is no erythema, telangectasias, nodularity, or pigmentation on the R nose..   - inflammatory papule on the L lip and R chin and on the L cheek  - No other lesions of concern on areas examined.     Medications:  Current Outpatient Medications   Medication     cyanocobalamin (VITAMIN B-12) 100 MCG tablet     esomeprazole (NEXIUM) 40 MG DR capsule     lidocaine (LIDODERM) 5 % patch     levothyroxine (SYNTHROID/LEVOTHROID) 112 MCG tablet     trimethoprim-polymyxin b (POLYTRIM) 57561-7.1 UNIT/ML-% ophthalmic solution     Current Facility-Administered Medications   Medication      lidocaine 1% with EPINEPHrine 1:100,000 injection 3 mL     lidocaine 1% with EPINEPHrine 1:100,000 injection 3 mL      Past Medical/Surgical History:   Patient Active Problem List   Diagnosis     Obesity     Hypothyroidism     Hyperlipidemia LDL goal <160     Osteoarthrosis involving multiple sites     Family history of malignant neoplasm of breast     Liver hemangioma     Esophageal reflux     Abdominal pain, generalized     IFG (impaired fasting glucose)     Lower back pain     Morbid obesity (H)     Past Medical History:   Diagnosis Date     Arthritis      Esophageal reflux      H pylori ulcer      Hypercholesterolemia      Hypothyroidism      Lyme disease      Thyroid cancer 1980     Tobacco use disorder 6/7/2003                        Again, thank you for allowing me to participate in the care of your patient.        Sincerely,        Kirsty Saavedra PA-C

## 2023-10-09 ENCOUNTER — ANCILLARY PROCEDURE (OUTPATIENT)
Dept: MAMMOGRAPHY | Facility: CLINIC | Age: 63
End: 2023-10-09
Attending: INTERNAL MEDICINE
Payer: COMMERCIAL

## 2023-10-09 DIAGNOSIS — Z12.31 ENCOUNTER FOR SCREENING MAMMOGRAM FOR BREAST CANCER: ICD-10-CM

## 2023-10-09 PROCEDURE — 77067 SCR MAMMO BI INCL CAD: CPT | Mod: TC | Performed by: RADIOLOGY

## 2023-10-09 PROCEDURE — 77063 BREAST TOMOSYNTHESIS BI: CPT | Mod: TC | Performed by: RADIOLOGY

## 2024-06-01 ENCOUNTER — HEALTH MAINTENANCE LETTER (OUTPATIENT)
Age: 64
End: 2024-06-01

## 2024-08-21 ENCOUNTER — OFFICE VISIT (OUTPATIENT)
Dept: FAMILY MEDICINE | Facility: CLINIC | Age: 64
End: 2024-08-21
Payer: COMMERCIAL

## 2024-08-21 VITALS
HEART RATE: 81 BPM | BODY MASS INDEX: 43.32 KG/M2 | DIASTOLIC BLOOD PRESSURE: 75 MMHG | RESPIRATION RATE: 14 BRPM | WEIGHT: 235.4 LBS | TEMPERATURE: 97.1 F | OXYGEN SATURATION: 93 % | SYSTOLIC BLOOD PRESSURE: 116 MMHG | HEIGHT: 62 IN

## 2024-08-21 DIAGNOSIS — R06.2 WHEEZING: ICD-10-CM

## 2024-08-21 DIAGNOSIS — M54.50 CHRONIC MIDLINE LOW BACK PAIN WITHOUT SCIATICA: ICD-10-CM

## 2024-08-21 DIAGNOSIS — Z00.00 ROUTINE GENERAL MEDICAL EXAMINATION AT A HEALTH CARE FACILITY: Primary | ICD-10-CM

## 2024-08-21 DIAGNOSIS — M25.551 HIP PAIN, RIGHT: ICD-10-CM

## 2024-08-21 DIAGNOSIS — E03.9 HYPOTHYROIDISM, UNSPECIFIED TYPE: ICD-10-CM

## 2024-08-21 DIAGNOSIS — E66.01 MORBID OBESITY (H): ICD-10-CM

## 2024-08-21 DIAGNOSIS — R73.01 IFG (IMPAIRED FASTING GLUCOSE): ICD-10-CM

## 2024-08-21 DIAGNOSIS — Z87.891 PERSONAL HISTORY OF TOBACCO USE: ICD-10-CM

## 2024-08-21 DIAGNOSIS — E78.5 HYPERLIPIDEMIA LDL GOAL <160: ICD-10-CM

## 2024-08-21 DIAGNOSIS — G89.29 CHRONIC MIDLINE LOW BACK PAIN WITHOUT SCIATICA: ICD-10-CM

## 2024-08-21 PROCEDURE — 99396 PREV VISIT EST AGE 40-64: CPT | Performed by: INTERNAL MEDICINE

## 2024-08-21 PROCEDURE — G0296 VISIT TO DETERM LDCT ELIG: HCPCS | Performed by: INTERNAL MEDICINE

## 2024-08-21 PROCEDURE — 99213 OFFICE O/P EST LOW 20 MIN: CPT | Mod: 25 | Performed by: INTERNAL MEDICINE

## 2024-08-21 RX ORDER — BIOTIN 10 MG
TABLET ORAL
COMMUNITY

## 2024-08-21 RX ORDER — OMEGA-3/DHA/EPA/FISH OIL 910-1400MG
CAPSULE ORAL DAILY
COMMUNITY

## 2024-08-21 RX ORDER — LEVOTHYROXINE SODIUM 137 UG/1
TABLET ORAL
COMMUNITY
Start: 2024-04-25

## 2024-08-21 RX ORDER — LIDOCAINE 50 MG/G
PATCH TOPICAL
Qty: 30 PATCH | Refills: 3 | Status: SHIPPED | OUTPATIENT
Start: 2024-08-21 | End: 2024-08-23

## 2024-08-21 RX ORDER — ALBUTEROL SULFATE 90 UG/1
2 AEROSOL, METERED RESPIRATORY (INHALATION) EVERY 6 HOURS PRN
Qty: 18 G | Refills: 1 | Status: SHIPPED | OUTPATIENT
Start: 2024-08-21

## 2024-08-21 SDOH — HEALTH STABILITY: PHYSICAL HEALTH: ON AVERAGE, HOW MANY DAYS PER WEEK DO YOU ENGAGE IN MODERATE TO STRENUOUS EXERCISE (LIKE A BRISK WALK)?: 2 DAYS

## 2024-08-21 ASSESSMENT — SOCIAL DETERMINANTS OF HEALTH (SDOH): HOW OFTEN DO YOU GET TOGETHER WITH FRIENDS OR RELATIVES?: ONCE A WEEK

## 2024-08-21 ASSESSMENT — PAIN SCALES - GENERAL: PAINLEVEL: SEVERE PAIN (7)

## 2024-08-21 NOTE — PROGRESS NOTES
Preventive Care Visit  [facility]  Jacob Nicole MD, MD, Internal Medicine  Aug 21, 2024          Shayan Adhikari is a 64 year old, presenting for the following:  Physical         Health Care Directive  Patient has a Health Care Directive on file  Advance care planning document is on file and is current.    HPI        8/21/2024   General Health   How would you rate your overall physical health? Good   Feel stress (tense, anxious, or unable to sleep) To some extent      (!) STRESS CONCERN      8/21/2024   Nutrition   Three or more servings of calcium each day? (!) NO   Diet: Regular (no restrictions)   How many servings of fruit and vegetables per day? (!) 2-3   How many sweetened beverages each day? 0-1            8/21/2024   Exercise   Days per week of moderate/strenous exercise 2 days      (!) EXERCISE CONCERN      8/21/2024   Social Factors   Frequency of gathering with friends or relatives Once a week   Worry food won't last until get money to buy more No   Food not last or not have enough money for food? No   Do you have housing? (Housing is defined as stable permanent housing and does not include staying ouside in a car, in a tent, in an abandoned building, in an overnight shelter, or couch-surfing.) Yes   Are you worried about losing your housing? No   Lack of transportation? No   Unable to get utilities (heat,electricity)? No            8/21/2024   Fall Risk   Fallen 2 or more times in the past year? No   Trouble with walking or balance? No             8/21/2024   Dental   Dentist two times every year? Yes            8/21/2024   TB Screening   Were you born outside of the [place]? No            Today's PHQ-2 Score:       8/21/2024     1:10 PM   PHQ-2 ( 1999 [employer])   Q1: Little interest or pleasure in doing things 1   Q2: Feeling down, depressed or hopeless 0   PHQ-2 Score 1   Q1: Little interest or pleasure in doing things Several days   Q2: Feeling down, depressed or hopeless  Not at all   PHQ-2 Score 1           8/21/2024   Substance Use   Alcohol more than 3/day or more than 7/wk No   Do you use any other substances recreationally? No        Social History     Tobacco Use    Smoking status: Former     Types: Hookah    Smokeless tobacco: Current    Tobacco comments:     e-cigarettes    Substance Use Topics    Alcohol use: Yes     Comment: 6 PER WEEK    Drug use: No           10/9/2023   LAST FHS-7 RESULTS   1st degree relative breast or ovarian cancer Yes   Any relative bilateral breast cancer No    No   Any male have breast cancer No    No   Any ONE woman have BOTH breast AND ovarian cancer No    No   Any woman with breast cancer before 50yrs No    No   2 or more relatives with breast AND/OR ovarian cancer No    No   2 or more relatives with breast AND/OR bowel cancer No    No       Multiple values from one day are sorted in reverse-chronological order        Mammogram Screening - Mammogram every 1-2 years updated in Health Maintenance based on mutual decision making          8/21/2024   One time HIV Screening   Previous HIV test? Yes          8/21/2024   STI Screening   New sexual partner(s) since last STI/HIV test? No        History of abnormal Pap smear: No - age 30- 64 PAP with HPV every 5 years recommended        Latest Ref Rng & Units 4/9/2015    11:02 AM 4/9/2015    12:00 AM   PAP / HPV   PAP (Historical)   NIL    HPV 16 DNA NEG Negative     HPV 18 DNA NEG Negative     Other HR HPV NEG Negative       ASCVD Risk   The 10-year ASCVD risk score (Riki WILSON, et al., 2019) is: 4.3%    Values used to calculate the score:      Age: 64 years      Sex: Female      Is Non- : No      Diabetic: No      Tobacco smoker: No      Systolic Blood Pressure: 116 mmHg      Is BP treated: No      HDL Cholesterol: 51 mg/dL      Total Cholesterol: 233 mg/dL       Reviewed and updated as needed this visit by Provider                    Past Medical History:   Diagnosis  "Date    Arthritis     Esophageal reflux     H pylori ulcer     Hypercholesterolemia     Hypothyroidism     Lyme disease     Thyroid cancer 1980    Tobacco use disorder 06/07/2003       Morbid obesity (H)   Weight remains elevated  Hypothyroidism, unspecified type   Continues on levothyroxine and no concerns - notes weight has gone up with lower dose of levothyroxine and now back on higher dose  Hyperlipidemia LDL goal <160   Not currently taking statin  Right hip pain   Onofre Robertson       Review of Systems  Constitutional, HEENT, cardiovascular, pulmonary, GI, , musculoskeletal, neuro, skin, endocrine and psych systems are negative, except as otherwise noted.     Objective    Exam  /75 (BP Location: Left arm, Patient Position: Sitting, Cuff Size: Adult Large)   Pulse 81   Temp 97.1  F (36.2  C) (Temporal)   Resp 14   Ht 1.585 m (5' 2.4\")   Wt 106.8 kg (235 lb 6.4 oz)   LMP 02/20/2015   SpO2 93%   BMI 42.50 kg/m     Estimated body mass index is 42.5 kg/m  as calculated from the following:    Height as of this encounter: 1.585 m (5' 2.4\").    Weight as of this encounter: 106.8 kg (235 lb 6.4 oz).    Physical Exam  GENERAL: alert and no distress  EYES: Eyes grossly normal to inspection, PERRL and conjunctivae and sclerae normal  HENT: ear canals and TM's normal, nose and mouth without ulcers or lesions  NECK: no adenopathy, no asymmetry, masses, or scars  RESP: lungs clear to auscultation - no rales, rhonchi or wheezes  CV: regular rate and rhythm, normal S1 S2, no S3 or S4, no murmur, click or rub, no peripheral edema  ABDOMEN: soft, nontender, no hepatosplenomegaly, no masses and bowel sounds normal  MS: no gross musculoskeletal defects noted, no edema  SKIN: no suspicious lesions or rashes  NEURO: Normal strength and tone, mentation intact and speech normal  PSYCH: mentation appears normal, affect normal/bright        Signed Electronically by: Jacob Nicole MD, MD  Lung Cancer " Screening Shared Decision Making Visit     Onofre Robertson, a 64 year old female, is eligible for lung cancer screening    History   Smoking Status    Former    Types: Cigarettes   Smokeless Tobacco    Current   {TIP  Follow this link to update the tobacco history if needed :05835    Patient Instructions   (Z00.00) Routine general medical examination at a health care facility  (primary encounter diagnosis)  Comment: For routine exam, we obtain out of state labs as ordered, cholesterol, diabetes mellitus check, liver function, renal function, and request mammogram -  [facility] (also performs diagnostic mammogram, ultrasound and biopsy) [phone]..   We will also update vaccination history.  Plan:     (E66.01) Morbid obesity (H)  Comment: We will discuss options including Wegovy and will route occupational therapy medical therapeutic management to discuss given history of thyroid cancer  Plan:     (E03.9) Hypothyroidism, unspecified type  Comment: Follow-up in endocrinology   Plan:     (E78.5) Hyperlipidemia LDL goal <160  Comment: check fasting lipid panel with outside clinic   Plan:     (M54.50,  G89.29) Chronic midline low back pain without sciatica  Comment:   Plan: lidocaine (LIDODERM) 5 % patch            (M25.551) Hip pain, right  Comment:   Plan: lidocaine (LIDODERM) 5 % patch, diclofenac         (VOLTAREN) 1 % topical gel            (R73.01) IFG (impaired fasting glucose)  Comment: check hemoglobin A1c today   Plan: Hemoglobin A1c            (Z87.891) Personal history of tobacco use  Comment: Recommend scrreening lung cancer CT [facility] phone #[phone] Plan: Prof fee: Shared Decision Making for Lung         Cancer Screening, CT Chest Lung Cancer Scrn Low        Dose wo            (R06.2) Wheezing  Comment: OK for albuterol   Plan: albuterol (PROAIR HFA/PROVENTIL HFA/VENTOLIN         HFA) 108 (90 Base) MCG/ACT inhaler             ShouldIScreen

## 2024-08-21 NOTE — PATIENT INSTRUCTIONS
(Z00.00) Routine general medical examination at a health care facility  (primary encounter diagnosis)  Comment: For routine exam, we obtain out of state labs as ordered, cholesterol, diabetes mellitus check, liver function, renal function, and request mammogram -  [facility] (also performs diagnostic mammogram, ultrasound and biopsy) [phone]..   We will also update vaccination history.  Plan:     (E66.01) Morbid obesity (H)  Comment: We will discuss options including Wegovy and will route occupational therapy medical therapeutic management to discuss given history of thyroid cancer  Plan:     (E03.9) Hypothyroidism, unspecified type  Comment: Follow-up in endocrinology   Plan:     (E78.5) Hyperlipidemia LDL goal <160  Comment: check fasting lipid panel with outside clinic   Plan:     (M54.50,  G89.29) Chronic midline low back pain without sciatica  Comment:   Plan: lidocaine (LIDODERM) 5 % patch            (M25.551) Hip pain, right  Comment:   Plan: lidocaine (LIDODERM) 5 % patch, diclofenac         (VOLTAREN) 1 % topical gel            (R73.01) IFG (impaired fasting glucose)  Comment: check hemoglobin A1c today   Plan: Hemoglobin A1c            (Z87.891) Personal history of tobacco use  Comment: Recommend scrreening lung cancer CT [facility] phone #[phone] Plan: Prof fee: Shared Decision Making for Lung         Cancer Screening, CT Chest Lung Cancer Scrn Low        Dose wo            (R06.2) Wheezing  Comment: OK for albuterol   Plan: albuterol (PROAIR HFA/PROVENTIL HFA/VENTOLIN         HFA) 108 (90 Base) MCG/ACT inhaler               Lung Cancer Screening   Frequently Asked Questions  If you are at high-risk for lung cancer, getting screened with low-dose computed tomography (LDCT) every year can help save your life. This handout offers answers to some of the most common questions about lung cancer screening. If you have other questions, please call [phone]  ([phone]).     What is it?  Lung cancer screening uses special X-ray technology to create an image of your lung tissue. The exam is quick and easy and takes less than 10 seconds. We don t give you any medicine or use any needles. You can eat before and after the exam. You don t need to change your clothes as long as the clothing on your chest doesn t contain metal. But, you do need to be able to hold your breath for at least 6 seconds during the exam.    What is the goal of lung cancer screening?  The goal of lung cancer screening is to save lives. Many times, lung cancer is not found until a person starts having physical symptoms. Lung cancer screening can help detect lung cancer in the earliest stages when it may be easier to treat.    Who should be screened for lung cancer?  We suggest lung cancer screening for anyone who is at high-risk for lung cancer. You are in the high-risk group if you:     are between the ages of 55 and 79, and   have smoked at least 1 pack of cigarettes a day for 20 or more years, and   still smoke or have quit within the past 15 years.    However, if you have a new cough or shortness of breath, you should talk to your doctor before being screened.    Why does it matter if I have symptoms?  Certain symptoms can be a sign that you have a condition in your lungs that should be checked and treated by your doctor. These symptoms include fever, chest pain, a new or changing cough, shortness of breath that you have never felt before, coughing up blood or unexplained weight loss. Having any of these symptoms can greatly affect the results of lung cancer screening.       Should all smokers get an LDCT lung cancer screening exam?  It depends. Lung cancer screening is for a very specific group of men and women who have a history of heavy smoking over a long period of time (see  Who should be screened for lung cancer  above).  I am in the high-risk group, but have been diagnosed with cancer  in the past. Is LDCT lung cancer screening right for me?  In some cases, you should not have LDCT lung screening, such as when your doctor is already following your cancer with CT scan studies. Your doctor will help you decide if LDCT lung screening is right for you.  Do I need to have a screening exam every year?  Yes. If you are in the high-risk group described earlier, you should get an LDCT lung cancer screening exam every year until you are 79, or are no longer willing or able to undergo screening and possible procedures to diagnose and treat lung cancer.  How effective is LDCT at preventing death from lung cancer?  Studies have shown that LDCT lung cancer screening can lower the risk of death from lung cancer by 20 percent in people who are at high-risk.  What are the risks?  There are some risks and limitations of LDCT lung cancer screening. We want to make sure you understand the risks and benefits, so please let us know if you have any questions. Your doctor may want to talk with you more about these risks.   Radiation exposure: As with any exam that uses radiation, there is a very small increased risk of cancer. The amount of radiation in LDCT is small--about the same amount a person would get from a mammogram. Your doctor orders the exam when he or she feels the potential benefits outweigh the risks.   False negatives: No test is perfect, including LDCT. It is possible that you may have a medical condition, including lung cancer, that is not found during your exam. This is called a false negative result.   False positives and more testing: LDCT very often finds something in the lung that could be cancer, but in fact is not. This is called a false positive result. False positive tests often cause anxiety. To make sure these findings are not cancer, you may need to have more tests. These tests will be done only if you give us permission. Sometimes patients need a treatment that can have side effects, such  as a biopsy. For more information on false positives, see  What can I expect from the results?    Findings not related to lung cancer: Your LDCT exam also takes pictures of areas of your body next to your lungs. In a very small number of cases, the CT scan will show an abnormal finding in one of these areas, such as your kidneys, adrenal glands, liver or thyroid. This finding may not be serious, but you may need more tests. Your doctor can help you decide what other tests you may need, if any.  What can I expect from the results?  About 1 out of 4 LDCT exams will find something that may need more tests. Most of the time, these findings are lung nodules. Lung nodules are very small collections of tissue in the lung. These nodules are very common, and the vast majority--more than 97 percent--are not cancer (benign). Most are normal lymph nodes or small areas of scarring from past infections.  But, if a small lung nodule is found to be cancer, the cancer can be cured more than 90 percent of the time. To know if the nodule is cancer, we may need to get more images before your next yearly screening exam. If the nodule has suspicious features (for example, it is large, has an odd shape or grows over time), we will refer you to a specialist for further testing.  Will my doctor also get the results?  Yes. Your doctor will get a copy of your results.  Is it okay to keep smoking now that there s a cancer screening exam?  No. Tobacco is one of the strongest cancer-causing agents. It causes not only lung cancer, but other cancers and cardiovascular (heart) diseases as well. The damage caused by smoking builds over time. This means that the longer you smoke, the higher your risk of disease. While it is never too late to quit, the sooner you quit, the better.  Where can I find help to quit smoking?  The best way to prevent lung cancer is to stop smoking. If you have already quit smoking, congratulations and keep it up! For help  on quitting smoking, please call [facility] at 4-465-QUITNOW (1-909.197.3896) or the American [employer] Society at 1-704.408.6268 to find local resources near you.  One-on-one health coaching:  If you d prefer to work individually with a health care provider on tobacco cessation, we offer:     Medication Therapy Management:  Our specially trained pharmacists work closely with you and your doctor to help you quit smoking.  Call [phone] or [phone] (toll free).

## 2024-08-23 ENCOUNTER — MYC MEDICAL ADVICE (OUTPATIENT)
Dept: FAMILY MEDICINE | Facility: CLINIC | Age: 64
End: 2024-08-23
Payer: COMMERCIAL

## 2024-08-23 DIAGNOSIS — M54.50 CHRONIC MIDLINE LOW BACK PAIN WITHOUT SCIATICA: ICD-10-CM

## 2024-08-23 DIAGNOSIS — M25.551 HIP PAIN, RIGHT: ICD-10-CM

## 2024-08-23 DIAGNOSIS — Z12.31 ENCOUNTER FOR SCREENING MAMMOGRAM FOR BREAST CANCER: ICD-10-CM

## 2024-08-23 DIAGNOSIS — E66.01 MORBID OBESITY (H): Primary | ICD-10-CM

## 2024-08-23 DIAGNOSIS — G89.29 CHRONIC MIDLINE LOW BACK PAIN WITHOUT SCIATICA: ICD-10-CM

## 2024-08-23 RX ORDER — LIDOCAINE 50 MG/G
PATCH TOPICAL
Qty: 30 PATCH | Refills: 3 | Status: SHIPPED | OUTPATIENT
Start: 2024-08-23

## 2024-08-23 NOTE — TELEPHONE ENCOUNTER
Dr. Nicole,    Pt is requesting a referral for a yearly mammogram.    Please also see question regarding Wegovy. Suggest an MTM referral? Referral pended for your review.     Per your 8/21 OV note:  (E66.01) Morbid obesity (H)  Comment: We will discuss options including Wegovy and will route occupational therapy medical therapeutic management to discuss given history of thyroid cancer    Thank you,  Loraine Taylor RN

## 2024-08-26 ENCOUNTER — TELEPHONE (OUTPATIENT)
Dept: FAMILY MEDICINE | Facility: CLINIC | Age: 64
End: 2024-08-26
Payer: COMMERCIAL

## 2024-08-26 NOTE — TELEPHONE ENCOUNTER
[facility] referral from: [place] clinic visit (referral by provider)    [facility] referral outreach attempt #1 on August 26, 2024 at 12:47 PM      Outcome: Spoke with patient has to wait for a year to be on medicare, since she has bcbs oos and everything is OOP, wants to wait    Use bcbs oos for the carrier/Plan on the flowsheet        Maryann Najera [facility]   [phone]       Clindamycin Counseling: I counseled the patient regarding use of clindamycin as an antibiotic for prophylactic and/or therapeutic purposes. Clindamycin is active against numerous classes of bacteria, including skin bacteria. Side effects may include nausea, diarrhea, gastrointestinal upset, rash, hives, yeast infections, and in rare cases, colitis.

## 2024-08-26 NOTE — LETTER
2024            RE: Onofre Robertson  [address]  [place] [address]  : 1960  MRN: [id number]        To Whom It May Concern,    I am writing on behalf of my patient, Onofre Robertson to document the medical necessity of Lidoderm patches  for the treatment of - Chronic midline low back pain without sciatica - Hip pain, right . This letter provides information about the patient's medical history and diagnosis and a statement summarizing my treatment rationale.       Summary of Patient History, Diagnosis, and Treatment Rationale  - Chronic midline low back pain without sciatica - Hip pain, right have been present for some times and has tried over the counter lidocaine without pain relief.  She is has used Lidoderm 5% patches with excellent relief.       In summary, Lidoderm patches are medically necessary for this patient s medical condition. Please call my office at [phone] if I can provide you with any additional information to approve my request. I look forward to receiving your timely response and approval of this request.         Sincerely,    Jacob Nicole MD, MD

## 2024-08-26 NOTE — TELEPHONE ENCOUNTER
"Retail Pharmacy Prior Authorization Team   Phone: [phone]        PRIOR AUTHORIZATION DENIED    Medication: LIDOCAINE 5 % EX PTCH  Insurance Company: [employer] [place] - Phone [phone] Fax [phone]  Denial Date: 8/23/2024  Denial Reason(s):         Appeal Information: To send an appeal to the patient's insurance, please provide a letter of medical necessity for the requested medication that was denied.  Please use the denial rationale from the patient's insurance to write the letter.  Once it is completed, please have it available under the \"letters tab\" in the patient's chart and route directly back to me: CODY AMTT and I can send the appeal to the patient's insurance.     Patient Notified: No. The [facility] PA Team does not notify of the denial outcomes as the patient often will ask what their provider will prescribe in place of the denied medication, or additional information in regards to other therapies they can take in place of the denied medication.  This is not something we can advise on in our department.    "

## 2024-08-26 NOTE — TELEPHONE ENCOUNTER
"Please see PA denial below and route message back to triage if further follow up is needed from nurse.       Prior Authorization Denied    Medication: lidocaine (LIDODERM) 5 % patch -EPA DENIED    See documentation for denial rationale/coverage criteria/appeal process.    Please advise if appeal is necessary and place a letter of medical necessity under the \"letters\" tab, or close the encounter when finished.    Thank you,  Central Prior Authorization Team  [phone]     "

## 2024-08-27 NOTE — TELEPHONE ENCOUNTER
"Patient called about PA denial. Pt states that she has used this in the past and continue to use and has tried OTC lidocaine does not provide enough pain relief. Pt requesting appeal as she meets criteria listed and PA denial indicates that a letter is needed from PCP.    Appeal Information: To send an appeal to the patient's insurance, please provide a letter of medical necessity for the requested medication that was denied.  Please use the denial rationale from the patient's insurance to write the letter.  Once it is completed, please have it available under the \"letters tab\" in the patient's chart and route directly back to me: CODY MATT and I can send the appeal to the patient's insurance.     Rachel Hughes RN    "

## 2024-08-28 NOTE — TELEPHONE ENCOUNTER
Appeal letter in the Letters tab. Will route to CODY MATT per instructions from below.     Randal Zavala RN  [facility]

## 2024-08-29 NOTE — TELEPHONE ENCOUNTER
Retail Pharmacy Prior Authorization Team   Phone: [phone]        Medication Appeal Initiation    Medication: LIDOCAINE 5 % EX PTCH  Appeal Start Date:  8/29/2024  Insurance Company:   Insurance Phone:   Insurance Fax:   Comments:       APPEAL FAXED:

## 2024-09-12 NOTE — TELEPHONE ENCOUNTER
Retail Pharmacy Prior Authorization Team   Phone: [phone]      Attempted to call Provider Services to check status on appeal.  Got routed to Medicaid plan instead of commercial.    Trying to locate the best number or department.

## 2024-09-12 NOTE — TELEPHONE ENCOUNTER
Retail Pharmacy Prior Authorization Team   Phone: [phone]      MEDICATION APPEAL APPROVED    Medication: LIDOCAINE 5 % EX PTCH  Authorization Effective Date: 9/8/2024  Authorization Expiration Date: 9/8/2025  Approved Dose/Quantity:   Reference #:     Appeal Insurance Company: [employer] [place]  Expected CoPay: $       CoPay Card Available: No  Financial Assistance Needed:   Filling Pharmacy: [id number] [place], [place] - [address] AT [facility] [address]  Patient Notified: **Instructed pharmacy to notify patient when script is ready to /ship.**  Comments:  SEPT 8TH 2024-SEPT 8TH 2025  APPEAL NUMBER: [id number]      Received confirmation that appeal was approved.  They are not able to fax the appeal approval letter.  I did leave a voicemail for the pharmacy.

## 2025-07-22 ENCOUNTER — PATIENT OUTREACH (OUTPATIENT)
Dept: CARE COORDINATION | Facility: CLINIC | Age: 65
End: 2025-07-22
Payer: COMMERCIAL

## (undated) DEVICE — KIT ENDO TURNOVER/PROCEDURE W/CLEAN A SCOPE LINERS 103888

## (undated) DEVICE — ENDO TRAP POLYP QUICK CATCH 710201

## (undated) DEVICE — ENDO SNARE EXACTO COLD 9MM LOOP 2.4MMX230CM 00711115

## (undated) RX ORDER — DIAZEPAM 5 MG
TABLET ORAL
Status: DISPENSED
Start: 2018-12-06

## (undated) RX ORDER — BUPIVACAINE HYDROCHLORIDE 5 MG/ML
INJECTION, SOLUTION EPIDURAL; INTRACAUDAL
Status: DISPENSED
Start: 2018-12-06

## (undated) RX ORDER — SIMETHICONE 40MG/0.6ML
SUSPENSION, DROPS(FINAL DOSAGE FORM)(ML) ORAL
Status: DISPENSED
Start: 2021-09-10